# Patient Record
Sex: FEMALE | Race: WHITE | Employment: OTHER | URBAN - METROPOLITAN AREA
[De-identification: names, ages, dates, MRNs, and addresses within clinical notes are randomized per-mention and may not be internally consistent; named-entity substitution may affect disease eponyms.]

---

## 2017-02-28 ENCOUNTER — HOSPITAL ENCOUNTER (OUTPATIENT)
Dept: MAMMOGRAPHY | Age: 72
Discharge: HOME | End: 2017-02-28
Attending: FAMILY MEDICINE
Payer: MEDICARE

## 2017-02-28 DIAGNOSIS — Z12.31 VISIT FOR SCREENING MAMMOGRAM: ICD-10-CM

## 2017-02-28 PROCEDURE — 77067 SCR MAMMO BI INCL CAD: CPT

## 2018-09-28 ENCOUNTER — HOSPITAL ENCOUNTER (OUTPATIENT)
Dept: MAMMOGRAPHY | Age: 73
Discharge: HOME | End: 2018-09-28
Attending: FAMILY MEDICINE
Payer: MEDICARE

## 2018-09-28 DIAGNOSIS — Z12.31 VISIT FOR SCREENING MAMMOGRAM: ICD-10-CM

## 2018-09-28 PROCEDURE — 77067 SCR MAMMO BI INCL CAD: CPT

## 2019-09-30 ENCOUNTER — HOSPITAL ENCOUNTER (OUTPATIENT)
Dept: MAMMOGRAPHY | Age: 74
Discharge: HOME | End: 2019-09-30
Attending: FAMILY MEDICINE
Payer: MEDICARE

## 2019-09-30 DIAGNOSIS — Z12.31 VISIT FOR SCREENING MAMMOGRAM: ICD-10-CM

## 2019-09-30 PROCEDURE — 77067 SCR MAMMO BI INCL CAD: CPT

## 2019-10-10 ENCOUNTER — HOSPITAL ENCOUNTER (OUTPATIENT)
Dept: PREADMISSION TESTING | Age: 74
Discharge: HOME | End: 2019-10-10
Payer: MEDICARE

## 2019-10-10 VITALS
BODY MASS INDEX: 32.84 KG/M2 | SYSTOLIC BLOOD PRESSURE: 134 MMHG | HEART RATE: 65 BPM | RESPIRATION RATE: 18 BRPM | DIASTOLIC BLOOD PRESSURE: 66 MMHG | HEIGHT: 64 IN | TEMPERATURE: 98.6 F | WEIGHT: 192.38 LBS

## 2019-10-10 LAB
ABO + RH BLD: NORMAL
ANION GAP SERPL CALC-SCNC: 7 MMOL/L (ref 5–15)
APPEARANCE UR: CLEAR
ATRIAL RATE: 58 BPM
BACTERIA URNS QL MICRO: NEGATIVE /HPF
BILIRUB UR QL: NEGATIVE
BLOOD GROUP ANTIBODIES SERPL: NORMAL
BUN SERPL-MCNC: 18 MG/DL (ref 6–20)
BUN/CREAT SERPL: 24 (ref 12–20)
CALCIUM SERPL-MCNC: 9.4 MG/DL (ref 8.5–10.1)
CALCULATED P AXIS, ECG09: 37 DEGREES
CALCULATED R AXIS, ECG10: 56 DEGREES
CALCULATED T AXIS, ECG11: 100 DEGREES
CHLORIDE SERPL-SCNC: 103 MMOL/L (ref 97–108)
CO2 SERPL-SCNC: 30 MMOL/L (ref 21–32)
COLOR UR: NORMAL
CREAT SERPL-MCNC: 0.74 MG/DL (ref 0.55–1.02)
DIAGNOSIS, 93000: NORMAL
EPITH CASTS URNS QL MICRO: NORMAL /LPF
ERYTHROCYTE [DISTWIDTH] IN BLOOD BY AUTOMATED COUNT: 13.1 % (ref 11.5–14.5)
EST. AVERAGE GLUCOSE BLD GHB EST-MCNC: 146 MG/DL
GLUCOSE SERPL-MCNC: 130 MG/DL (ref 65–100)
GLUCOSE UR STRIP.AUTO-MCNC: NEGATIVE MG/DL
HBA1C MFR BLD: 6.7 % (ref 4.2–6.3)
HCT VFR BLD AUTO: 41.4 % (ref 35–47)
HGB BLD-MCNC: 13.2 G/DL (ref 11.5–16)
HGB UR QL STRIP: NEGATIVE
INR PPP: 1.1 (ref 0.9–1.1)
KETONES UR QL STRIP.AUTO: NEGATIVE MG/DL
LEUKOCYTE ESTERASE UR QL STRIP.AUTO: NEGATIVE
MCH RBC QN AUTO: 29.4 PG (ref 26–34)
MCHC RBC AUTO-ENTMCNC: 31.9 G/DL (ref 30–36.5)
MCV RBC AUTO: 92.2 FL (ref 80–99)
NITRITE UR QL STRIP.AUTO: NEGATIVE
NRBC # BLD: 0 K/UL (ref 0–0.01)
NRBC BLD-RTO: 0 PER 100 WBC
P-R INTERVAL, ECG05: 162 MS
PH UR STRIP: 6.5 [PH] (ref 5–8)
PLATELET # BLD AUTO: 255 K/UL (ref 150–400)
PMV BLD AUTO: 10.9 FL (ref 8.9–12.9)
POTASSIUM SERPL-SCNC: 3.8 MMOL/L (ref 3.5–5.1)
PROT UR STRIP-MCNC: NEGATIVE MG/DL
PROTHROMBIN TIME: 10.8 SEC (ref 9–11.1)
Q-T INTERVAL, ECG07: 450 MS
QRS DURATION, ECG06: 82 MS
QTC CALCULATION (BEZET), ECG08: 441 MS
RBC # BLD AUTO: 4.49 M/UL (ref 3.8–5.2)
RBC #/AREA URNS HPF: NORMAL /HPF (ref 0–5)
SODIUM SERPL-SCNC: 140 MMOL/L (ref 136–145)
SP GR UR REFRACTOMETRY: 1.01 (ref 1–1.03)
SPECIMEN EXP DATE BLD: NORMAL
UA: UC IF INDICATED,UAUC: NORMAL
UROBILINOGEN UR QL STRIP.AUTO: 0.2 EU/DL (ref 0.2–1)
VENTRICULAR RATE, ECG03: 58 BPM
WBC # BLD AUTO: 5.9 K/UL (ref 3.6–11)
WBC URNS QL MICRO: NORMAL /HPF (ref 0–4)

## 2019-10-10 PROCEDURE — 85027 COMPLETE CBC AUTOMATED: CPT

## 2019-10-10 PROCEDURE — 80048 BASIC METABOLIC PNL TOTAL CA: CPT

## 2019-10-10 PROCEDURE — 85610 PROTHROMBIN TIME: CPT

## 2019-10-10 PROCEDURE — 93005 ELECTROCARDIOGRAM TRACING: CPT

## 2019-10-10 PROCEDURE — 81001 URINALYSIS AUTO W/SCOPE: CPT

## 2019-10-10 PROCEDURE — 86900 BLOOD TYPING SEROLOGIC ABO: CPT

## 2019-10-10 PROCEDURE — 83036 HEMOGLOBIN GLYCOSYLATED A1C: CPT

## 2019-10-10 RX ORDER — ATORVASTATIN CALCIUM 10 MG/1
10 TABLET, FILM COATED ORAL
COMMUNITY
End: 2021-12-03 | Stop reason: SDUPTHER

## 2019-10-10 RX ORDER — MONTELUKAST SODIUM 10 MG/1
10 TABLET ORAL
COMMUNITY
End: 2022-04-05 | Stop reason: SDUPTHER

## 2019-10-10 RX ORDER — METFORMIN HYDROCHLORIDE 500 MG/1
1 TABLET, FILM COATED, EXTENDED RELEASE ORAL EVERY EVENING
COMMUNITY
End: 2021-11-29 | Stop reason: SDUPTHER

## 2019-10-10 RX ORDER — ESCITALOPRAM OXALATE 20 MG/1
20 TABLET ORAL EVERY EVENING
COMMUNITY
End: 2021-12-01 | Stop reason: SDUPTHER

## 2019-10-10 RX ORDER — CLONAZEPAM 1 MG/1
1 TABLET ORAL
COMMUNITY
End: 2019-10-10

## 2019-10-10 RX ORDER — LEVOTHYROXINE SODIUM 112 UG/1
112 TABLET ORAL
COMMUNITY
End: 2021-11-29 | Stop reason: SDUPTHER

## 2019-10-10 RX ORDER — HYDROCHLOROTHIAZIDE 12.5 MG/1
12.5 TABLET ORAL EVERY EVENING
COMMUNITY
End: 2021-12-01 | Stop reason: SDUPTHER

## 2019-10-10 RX ORDER — TRAZODONE HYDROCHLORIDE 50 MG/1
50 TABLET ORAL
COMMUNITY
End: 2021-12-03 | Stop reason: SDUPTHER

## 2019-10-10 RX ORDER — DICLOFENAC SODIUM 100 MG/1
100 TABLET, FILM COATED, EXTENDED RELEASE ORAL
COMMUNITY
End: 2019-10-10

## 2019-10-10 RX ORDER — OMEPRAZOLE 20 MG/1
20 CAPSULE, DELAYED RELEASE ORAL EVERY EVENING
COMMUNITY
End: 2021-07-20

## 2019-10-10 RX ORDER — DICLOFENAC SODIUM 10 MG/G
4 GEL TOPICAL
COMMUNITY
End: 2021-05-31

## 2019-10-10 RX ORDER — LISINOPRIL 40 MG/1
40 TABLET ORAL
COMMUNITY
End: 2021-12-01 | Stop reason: SDUPTHER

## 2019-10-10 RX ORDER — CLONAZEPAM 1 MG/1
1 TABLET ORAL
COMMUNITY
End: 2021-07-20

## 2019-10-10 NOTE — PERIOP NOTES
Preoperative instructions reviewed with patient. Patient given  2 bottles of CHG soap. Instructions reviewed on use of CHG soap. Patient given SSI infection FAQS sheet, as well as a  MRSA/MSSA treatment instruction sheet  With an explanation to patient that they will be notified if treatment instructions need to be initiated. Patient was given the opportunity to ask questions on the information provided.

## 2019-10-11 PROBLEM — M17.12 PRIMARY LOCALIZED OSTEOARTHRITIS OF LEFT KNEE: Status: ACTIVE | Noted: 2019-10-11

## 2019-10-11 LAB
BACTERIA SPEC CULT: NORMAL
BACTERIA SPEC CULT: NORMAL
SERVICE CMNT-IMP: NORMAL

## 2019-10-11 RX ORDER — CEFAZOLIN SODIUM/WATER 2 G/20 ML
2 SYRINGE (ML) INTRAVENOUS EVERY 8 HOURS
Status: CANCELLED | OUTPATIENT
Start: 2019-10-11 | End: 2019-10-12

## 2019-10-11 NOTE — H&P
Patient ID: Gonzalez Rice is a 75 y.o. female.     Chief Complaint: Pain of the Right Knee and Pain of the Left Knee     Elias Mnan comes in to the clinic today for evaluation of both knees. She complains of a gradual onset of bilateral knee pain for the last 3-4 years. She indicates that the pain is increasing throughout the years. She has had steroid injection and from her description sounds like visco-supplementation. Even with steroid injection and visco-supplementation, she continues to have increasing pain in her knees. When going up the stairs she requires the rail to pull herself up due to her pain. She has difficult getting up from a chair complaining of anterior knee pain. She does have pain with routine walking activities. She does feel that she has failed conservative treatment. No recent injuries. No radicular symptoms. No systemic symptoms of infection.         Review of Systems   10/3/2019     Constitutional: Unexplained: Negative  Genitourinary: Frequent Urination: Negative  HEENT: Vision Loss: Negative  Neurological: Memory Loss: Negative  Integumentary: Rash: Negative  Cardiovascular: Palpatations: Negative  Hematologic: Bruises/Bleeds Easily: Positive  Gastrointestinal: Constipation: Negative  Immunological: Seasonal Allergies: Positive  Musculoskeletal: Joint Pain: Positive     Medical History        Past Medical History:   Diagnosis Date    Anxiety      Asthma      Depression      Diabetes mellitus      Hypertension              Surgical History         Past Surgical History:   Procedure Laterality Date    BREAST RECONSTRUCTION        CARPAL TUNNEL RELEASE        CHOLECYSTECTOMY        FOOT SURGERY        TONSILLECTOMY        UMBILICAL HERNIA REPAIR                Objective:      There were no vitals filed for this visit.     Constitutional:  No acute distress. Well nourished. Well developed. Eyes:  Sclera are nonicteric.   Respiratory:  No labored breathing. Cardiovascular:  No marked edema. Skin:  No marked skin ulcers. Neurological:  No marked sensory loss noted. Psychiatric: Alert and oriented x3. Musculoskeletal      On exam of both knees reveals a lack of 5 degrees to full extension and flexion to 116 degrees on the left knee and a lack of 4 degrees to full extension and flexion to 116 degrees on the right knee. She has marked crepitus and tenderness in the patellofemoral joint of both knees. Diffuse tenderness throughout the knee joint. Synovial hypertrophy. The cruciate and collateral ligaments are stable. No effusion. No sign of infection. No ecchymosis or erythema. No cellulitis or rash. No calf pain, no evidence of DVT. I detect no obvious motor or sensory deficits in the lower extremities. The extensor mechanism is intact. The neurovascular status is intact.        Imaging/Studies:    Order: XR KNEE 4+ VW LEFT - Indication: Chronic pain of both knees  Order: XR KNEE 3 VW RIGHT - Indication: Chronic pain of both knees        X-ray Knee Right 3 Views     Result Date: 10/3/2019  Views: Standing AP, Lat, Langerma, Aabenraa.      Impression: Impression: I ordered and interpreted X-rays of the right knee. They revealed advance degenerative changes to the patellofemoral joint with bone on bone contact. Significant tibial femoral degenerative changes noted.      X-ray Knee Left 4+ Views ([id number])     Result Date: 10/3/2019  Views: Jarvis Stacy, Standing AP, Aabenraa.      Impression: Impression: I ordered and interpreted X-rays of the right knee. They revealed advance degenerative changes to the patellofemoral joint with bone on bone contact. Significant tibial femoral degenerative changes noted.      X-ray Foot Left 3+ Views ([id number])     Result Date: 10/2/2019  Shielding: Not Shielded. Weight Bearing. Standing AP, Oblique, Lat.      Impression: Radiographs demonstrate MTP degenerative changes about the great toe and 2nd toe.   Subluxation of the great toe at the MTP joint        Assessment:   There is no problem list on file for this patient.            ICD-10-CM   1. Chronic pain of both knees M25.561     M25.562     G89.29   2. Primary osteoarthritis of both knees M17.0   3. Synovitis of left knee M65.9   4. Synovitis of right knee M65.9            Plan:   I personally reviewed my findings with the patient. I reviewed the pathophysiology and explained that advance degenerative arthritis with bone on bone contact of the patellofemoral joint, left more than right, and synovitis to both knees.      We discussed treatment options. She feels that she has failed conservative treatment including steroid injection and visco-supplementation. I explained that the only modality left would be total knee replacement. I explained the hospitalization, post-op and rehabilitation for the procedure. We discussed all complications associated with the surgery, including the chance of infection, DVT, and pulmonary embolus. I explained the use of antibiotics and anti-coagulants following surgery to prevent infection and DVT and we discussed the course of post-op physical therapy for rehabilitation.     She verbalized her understanding of my explanation and would like to proceed with total knee replacement. She was more symptomatic with the left knee, and she would like to proceed with left total knee replacement. We will tentatively schedule her left total knee replacement in the next few weeks. She understands she will undergo left total knee replacement first    PROCEDURE: Left total knee replacement. Date of Surgery Update:  Aidee Wilkinson was seen and examined. Past Medical History:   Diagnosis Date    Anxiety state, unspecified     Arthritis     knes and back , foot osteoarthritis    Asthma     [facility] [address]     Hypertension     Hypothyroidism     Menopause     LMP-45years old?     Morbid obesity ([facility] [address]     Psychiatric disorder     ANXIETY, DEPRESSION    Seizures ([address]     had a seizure as a result of medication about 50 years ago    Vitamin D deficiency 5/11/2010     Prior to Admission Medications   Prescriptions Last Dose Informant Patient Reported? Taking? Cholecalciferol, Vitamin D3, (VITAMIN D3) 1,000 unit Cap  Self Yes No   Sig: Take 2,000 Units by mouth every evening. atorvastatin (LIPITOR) 10 mg tablet  Self Yes No   Sig: Take 10 mg by mouth nightly. clonazePAM (KLONOPIN) 1 mg tablet   Yes No   Sig: Take 1 mg by mouth nightly. diclofenac (VOLTAREN) 1 % gel  Self Yes No   Sig: Apply 4 g to affected area daily as needed. escitalopram oxalate (LEXAPRO) 20 mg tablet  Self Yes No   Sig: Take 20 mg by mouth every evening. hydroCHLOROthiazide (HYDRODIURIL) 12.5 mg tablet  Self Yes No   Sig: Take 12.5 mg by mouth every evening. levothyroxine (SYNTHROID) 112 mcg tablet  Self Yes No   Sig: Take 112 mcg by mouth Daily (before breakfast). lisinopril (PRINIVIL, ZESTRIL) 40 mg tablet  Self Yes No   Sig: Take 40 mg by mouth every evening. metFORMIN (GLUMETZA ER) 500 mg TG24 24 hour tablet  Self Yes No   Sig: Take 1 Tab by mouth every evening.   montelukast (SINGULAIR) 10 mg tablet  Self Yes No   Sig: Take 10 mg by mouth nightly. omeprazole (PRILOSEC) 20 mg capsule  Self Yes No   Sig: Take 20 mg by mouth every evening. traZODone (DESYREL) 50 mg tablet  Self Yes No   Sig: Take 50 mg by mouth nightly. Facility-Administered Medications: None      Allergy to: Everlina Formosa; Codeine; Keflex [cephalexin]; and Tussionex  Physical Examination: General appearance - alert, well appearing, and in no distress  History and physical has been reviewed. The patient has been examined.  There have been no significant clinical changes since the completion of the originally dated History and Physical.    Signed By: Leonarda Tsai PA-C     October 16, 2019 10:32 AM

## 2019-10-15 ENCOUNTER — ANESTHESIA EVENT (OUTPATIENT)
Dept: SURGERY | Age: 74
End: 2019-10-15
Payer: MEDICARE

## 2019-10-16 ENCOUNTER — HOSPITAL ENCOUNTER (OUTPATIENT)
Age: 74
Setting detail: OBSERVATION
Discharge: HOME HEALTH | End: 2019-10-20
Attending: ORTHOPAEDIC SURGERY | Admitting: ORTHOPAEDIC SURGERY
Payer: MEDICARE

## 2019-10-16 ENCOUNTER — ANESTHESIA (OUTPATIENT)
Dept: SURGERY | Age: 74
End: 2019-10-16
Payer: MEDICARE

## 2019-10-16 DIAGNOSIS — M17.12 PRIMARY LOCALIZED OSTEOARTHRITIS OF LEFT KNEE: Primary | ICD-10-CM

## 2019-10-16 LAB
GLUCOSE BLD STRIP.AUTO-MCNC: 117 MG/DL (ref 65–100)
GLUCOSE BLD STRIP.AUTO-MCNC: 152 MG/DL (ref 65–100)
SERVICE CMNT-IMP: ABNORMAL
SERVICE CMNT-IMP: ABNORMAL

## 2019-10-16 PROCEDURE — 76010000171 HC OR TIME 2 TO 2.5 HR INTENSV-TIER 1: Performed by: ORTHOPAEDIC SURGERY

## 2019-10-16 PROCEDURE — 82962 GLUCOSE BLOOD TEST: CPT

## 2019-10-16 PROCEDURE — 77030018836 HC SOL IRR NACL ICUM -A: Performed by: ORTHOPAEDIC SURGERY

## 2019-10-16 PROCEDURE — 77030040922 HC BLNKT HYPOTHRM STRY -A

## 2019-10-16 PROCEDURE — 74011250636 HC RX REV CODE- 250/636: Performed by: NURSE ANESTHETIST, CERTIFIED REGISTERED

## 2019-10-16 PROCEDURE — 74011250636 HC RX REV CODE- 250/636: Performed by: PHYSICIAN ASSISTANT

## 2019-10-16 PROCEDURE — 77030031139 HC SUT VCRL2 J&J -A: Performed by: ORTHOPAEDIC SURGERY

## 2019-10-16 PROCEDURE — 76210000006 HC OR PH I REC 0.5 TO 1 HR: Performed by: ORTHOPAEDIC SURGERY

## 2019-10-16 PROCEDURE — 74011000258 HC RX REV CODE- 258: Performed by: PHYSICIAN ASSISTANT

## 2019-10-16 PROCEDURE — 99218 HC RM OBSERVATION: CPT

## 2019-10-16 PROCEDURE — 77030012935 HC DRSG AQUACEL BMS -B: Performed by: ORTHOPAEDIC SURGERY

## 2019-10-16 PROCEDURE — 77030018846 HC SOL IRR STRL H20 ICUM -A: Performed by: ORTHOPAEDIC SURGERY

## 2019-10-16 PROCEDURE — 77030035236 HC SUT PDS STRATFX BARB J&J -B: Performed by: ORTHOPAEDIC SURGERY

## 2019-10-16 PROCEDURE — 77030003601 HC NDL NRV BLK BBMI -A

## 2019-10-16 PROCEDURE — 74011250636 HC RX REV CODE- 250/636: Performed by: ANESTHESIOLOGY

## 2019-10-16 PROCEDURE — 74011000258 HC RX REV CODE- 258: Performed by: NURSE ANESTHETIST, CERTIFIED REGISTERED

## 2019-10-16 PROCEDURE — 77030006835 HC BLD SAW SAG STRY -B: Performed by: ORTHOPAEDIC SURGERY

## 2019-10-16 PROCEDURE — 77030005513 HC CATH URETH FOL11 MDII -B: Performed by: ORTHOPAEDIC SURGERY

## 2019-10-16 PROCEDURE — 74011250637 HC RX REV CODE- 250/637: Performed by: ANESTHESIOLOGY

## 2019-10-16 PROCEDURE — 76060000036 HC ANESTHESIA 2.5 TO 3 HR: Performed by: ORTHOPAEDIC SURGERY

## 2019-10-16 PROCEDURE — C9290 INJ, BUPIVACAINE LIPOSOME: HCPCS | Performed by: PHYSICIAN ASSISTANT

## 2019-10-16 PROCEDURE — 77030014077 HC TOWER MX CEM J&J -C: Performed by: ORTHOPAEDIC SURGERY

## 2019-10-16 PROCEDURE — 74011000250 HC RX REV CODE- 250: Performed by: NURSE ANESTHETIST, CERTIFIED REGISTERED

## 2019-10-16 PROCEDURE — 77030011640 HC PAD GRND REM COVD -A: Performed by: ORTHOPAEDIC SURGERY

## 2019-10-16 PROCEDURE — 77030040361 HC SLV COMPR DVT MDII -B: Performed by: ORTHOPAEDIC SURGERY

## 2019-10-16 PROCEDURE — 65390000012 HC CONDITION CODE 44 OBSERVATION

## 2019-10-16 PROCEDURE — 77030039497 HC CST PAD STERILE CHCS -A: Performed by: ORTHOPAEDIC SURGERY

## 2019-10-16 PROCEDURE — 74011000272 HC RX REV CODE- 272: Performed by: PHYSICIAN ASSISTANT

## 2019-10-16 PROCEDURE — C1713 ANCHOR/SCREW BN/BN,TIS/BN: HCPCS | Performed by: ORTHOPAEDIC SURGERY

## 2019-10-16 PROCEDURE — C1776 JOINT DEVICE (IMPLANTABLE): HCPCS | Performed by: ORTHOPAEDIC SURGERY

## 2019-10-16 PROCEDURE — 74011000250 HC RX REV CODE- 250: Performed by: PHYSICIAN ASSISTANT

## 2019-10-16 PROCEDURE — 74011250636 HC RX REV CODE- 250/636: Performed by: ORTHOPAEDIC SURGERY

## 2019-10-16 PROCEDURE — 77030040361 HC SLV COMPR DVT MDII -B

## 2019-10-16 PROCEDURE — 77030018673: Performed by: ORTHOPAEDIC SURGERY

## 2019-10-16 PROCEDURE — 77030012893

## 2019-10-16 PROCEDURE — 77030008462 HC STPLR SKN PROX J&J -A: Performed by: ORTHOPAEDIC SURGERY

## 2019-10-16 PROCEDURE — 74011250637 HC RX REV CODE- 250/637: Performed by: PHYSICIAN ASSISTANT

## 2019-10-16 DEVICE — INSERT TIB RP FEM KNEE CEM: Type: IMPLANTABLE DEVICE | Status: FUNCTIONAL

## 2019-10-16 DEVICE — SMARTSET GHV GENTAMICIN HIGH VISCOSITY BONE CEMENT 40G
Type: IMPLANTABLE DEVICE | Site: KNEE | Status: FUNCTIONAL
Brand: SMARTSET

## 2019-10-16 DEVICE — ATTUNE KNEE SYSTEM TIBIAL INSERT FIXED BEARING POSTERIOR STABILIZED 4 5MM AOX
Type: IMPLANTABLE DEVICE | Site: KNEE | Status: FUNCTIONAL
Brand: ATTUNE

## 2019-10-16 DEVICE — ATTUNE KNEE SYSTEM FEMORAL POSTERIOR STABILIZED SIZE 4 LEFT CEMENTED
Type: IMPLANTABLE DEVICE | Site: KNEE | Status: FUNCTIONAL
Brand: ATTUNE

## 2019-10-16 DEVICE — ATTUNE PATELLA MEDIALIZED DOME 35MM CEMENTED AOX
Type: IMPLANTABLE DEVICE | Site: KNEE | Status: FUNCTIONAL
Brand: ATTUNE

## 2019-10-16 DEVICE — ATTUNE KNEE SYSTEM TIBIAL BASE FIXED BEARING SIZE 4 CEMENTED
Type: IMPLANTABLE DEVICE | Site: KNEE | Status: FUNCTIONAL
Brand: ATTUNE

## 2019-10-16 RX ORDER — SODIUM CHLORIDE 9 MG/ML
25 INJECTION, SOLUTION INTRAVENOUS CONTINUOUS
Status: DISCONTINUED | OUTPATIENT
Start: 2019-10-16 | End: 2019-10-16 | Stop reason: HOSPADM

## 2019-10-16 RX ORDER — ACETAMINOPHEN 325 MG/1
650 TABLET ORAL ONCE
Status: COMPLETED | OUTPATIENT
Start: 2019-10-16 | End: 2019-10-16

## 2019-10-16 RX ORDER — MORPHINE SULFATE 10 MG/ML
2 INJECTION, SOLUTION INTRAMUSCULAR; INTRAVENOUS
Status: DISCONTINUED | OUTPATIENT
Start: 2019-10-16 | End: 2019-10-16 | Stop reason: HOSPADM

## 2019-10-16 RX ORDER — MIDAZOLAM HYDROCHLORIDE 1 MG/ML
0.5 INJECTION, SOLUTION INTRAMUSCULAR; INTRAVENOUS
Status: DISCONTINUED | OUTPATIENT
Start: 2019-10-16 | End: 2019-10-16 | Stop reason: HOSPADM

## 2019-10-16 RX ORDER — ONDANSETRON 2 MG/ML
4 INJECTION INTRAMUSCULAR; INTRAVENOUS AS NEEDED
Status: DISCONTINUED | OUTPATIENT
Start: 2019-10-16 | End: 2019-10-16 | Stop reason: HOSPADM

## 2019-10-16 RX ORDER — MIDAZOLAM HYDROCHLORIDE 1 MG/ML
INJECTION, SOLUTION INTRAMUSCULAR; INTRAVENOUS AS NEEDED
Status: DISCONTINUED | OUTPATIENT
Start: 2019-10-16 | End: 2019-10-16 | Stop reason: HOSPADM

## 2019-10-16 RX ORDER — MIDAZOLAM HYDROCHLORIDE 1 MG/ML
1 INJECTION, SOLUTION INTRAMUSCULAR; INTRAVENOUS AS NEEDED
Status: DISCONTINUED | OUTPATIENT
Start: 2019-10-16 | End: 2019-10-16 | Stop reason: HOSPADM

## 2019-10-16 RX ORDER — PROPOFOL 10 MG/ML
INJECTION, EMULSION INTRAVENOUS AS NEEDED
Status: DISCONTINUED | OUTPATIENT
Start: 2019-10-16 | End: 2019-10-16

## 2019-10-16 RX ORDER — SODIUM CHLORIDE, SODIUM LACTATE, POTASSIUM CHLORIDE, CALCIUM CHLORIDE 600; 310; 30; 20 MG/100ML; MG/100ML; MG/100ML; MG/100ML
1000 INJECTION, SOLUTION INTRAVENOUS CONTINUOUS
Status: DISCONTINUED | OUTPATIENT
Start: 2019-10-16 | End: 2019-10-16 | Stop reason: HOSPADM

## 2019-10-16 RX ORDER — OXYCODONE HYDROCHLORIDE 5 MG/1
10 TABLET ORAL
Status: DISCONTINUED | OUTPATIENT
Start: 2019-10-16 | End: 2019-10-20 | Stop reason: HOSPADM

## 2019-10-16 RX ORDER — KETAMINE HYDROCHLORIDE 10 MG/ML
INJECTION, SOLUTION INTRAMUSCULAR; INTRAVENOUS AS NEEDED
Status: DISCONTINUED | OUTPATIENT
Start: 2019-10-16 | End: 2019-10-16 | Stop reason: HOSPADM

## 2019-10-16 RX ORDER — FACIAL-BODY WIPES
10 EACH TOPICAL DAILY PRN
Status: DISCONTINUED | OUTPATIENT
Start: 2019-10-18 | End: 2019-10-20 | Stop reason: HOSPADM

## 2019-10-16 RX ORDER — HYDROMORPHONE HYDROCHLORIDE 1 MG/ML
0.5 INJECTION, SOLUTION INTRAMUSCULAR; INTRAVENOUS; SUBCUTANEOUS
Status: ACTIVE | OUTPATIENT
Start: 2019-10-16 | End: 2019-10-17

## 2019-10-16 RX ORDER — ACETAMINOPHEN 325 MG/1
650 TABLET ORAL EVERY 6 HOURS
Status: DISCONTINUED | OUTPATIENT
Start: 2019-10-16 | End: 2019-10-20 | Stop reason: HOSPADM

## 2019-10-16 RX ORDER — CEFAZOLIN SODIUM/WATER 2 G/20 ML
2 SYRINGE (ML) INTRAVENOUS EVERY 8 HOURS
Status: COMPLETED | OUTPATIENT
Start: 2019-10-16 | End: 2019-10-17

## 2019-10-16 RX ORDER — FAMOTIDINE 20 MG/1
20 TABLET, FILM COATED ORAL
Status: DISCONTINUED | OUTPATIENT
Start: 2019-10-16 | End: 2019-10-20 | Stop reason: HOSPADM

## 2019-10-16 RX ORDER — DEXAMETHASONE SODIUM PHOSPHATE 4 MG/ML
INJECTION, SOLUTION INTRA-ARTICULAR; INTRALESIONAL; INTRAMUSCULAR; INTRAVENOUS; SOFT TISSUE AS NEEDED
Status: DISCONTINUED | OUTPATIENT
Start: 2019-10-16 | End: 2019-10-16 | Stop reason: HOSPADM

## 2019-10-16 RX ORDER — FENTANYL CITRATE 50 UG/ML
50 INJECTION, SOLUTION INTRAMUSCULAR; INTRAVENOUS AS NEEDED
Status: DISCONTINUED | OUTPATIENT
Start: 2019-10-16 | End: 2019-10-16 | Stop reason: HOSPADM

## 2019-10-16 RX ORDER — ONDANSETRON 4 MG/1
4 TABLET, ORALLY DISINTEGRATING ORAL
Status: DISCONTINUED | OUTPATIENT
Start: 2019-10-16 | End: 2019-10-20 | Stop reason: HOSPADM

## 2019-10-16 RX ORDER — AMOXICILLIN 250 MG
1 CAPSULE ORAL 2 TIMES DAILY
Status: DISCONTINUED | OUTPATIENT
Start: 2019-10-16 | End: 2019-10-20 | Stop reason: HOSPADM

## 2019-10-16 RX ORDER — MAGNESIUM SULFATE 100 %
4 CRYSTALS MISCELLANEOUS AS NEEDED
Status: DISCONTINUED | OUTPATIENT
Start: 2019-10-16 | End: 2019-10-20 | Stop reason: HOSPADM

## 2019-10-16 RX ORDER — LIDOCAINE HYDROCHLORIDE 10 MG/ML
0.1 INJECTION, SOLUTION EPIDURAL; INFILTRATION; INTRACAUDAL; PERINEURAL AS NEEDED
Status: DISCONTINUED | OUTPATIENT
Start: 2019-10-16 | End: 2019-10-16 | Stop reason: HOSPADM

## 2019-10-16 RX ORDER — CELECOXIB 200 MG/1
200 CAPSULE ORAL ONCE
Status: COMPLETED | OUTPATIENT
Start: 2019-10-16 | End: 2019-10-16

## 2019-10-16 RX ORDER — ONDANSETRON 2 MG/ML
INJECTION INTRAMUSCULAR; INTRAVENOUS AS NEEDED
Status: DISCONTINUED | OUTPATIENT
Start: 2019-10-16 | End: 2019-10-16 | Stop reason: HOSPADM

## 2019-10-16 RX ORDER — INSULIN LISPRO 100 [IU]/ML
INJECTION, SOLUTION INTRAVENOUS; SUBCUTANEOUS
Status: DISCONTINUED | OUTPATIENT
Start: 2019-10-16 | End: 2019-10-20 | Stop reason: HOSPADM

## 2019-10-16 RX ORDER — ALBUTEROL SULFATE 0.83 MG/ML
2.5 SOLUTION RESPIRATORY (INHALATION) AS NEEDED
Status: DISCONTINUED | OUTPATIENT
Start: 2019-10-16 | End: 2019-10-16 | Stop reason: HOSPADM

## 2019-10-16 RX ORDER — SODIUM CHLORIDE 0.9 % (FLUSH) 0.9 %
5-40 SYRINGE (ML) INJECTION AS NEEDED
Status: DISCONTINUED | OUTPATIENT
Start: 2019-10-16 | End: 2019-10-20 | Stop reason: HOSPADM

## 2019-10-16 RX ORDER — PROPOFOL 10 MG/ML
INJECTION, EMULSION INTRAVENOUS
Status: DISCONTINUED | OUTPATIENT
Start: 2019-10-16 | End: 2019-10-16 | Stop reason: HOSPADM

## 2019-10-16 RX ORDER — BUPIVACAINE HYDROCHLORIDE 5 MG/ML
INJECTION, SOLUTION EPIDURAL; INTRACAUDAL
Status: COMPLETED | OUTPATIENT
Start: 2019-10-16 | End: 2019-10-16

## 2019-10-16 RX ORDER — POLYETHYLENE GLYCOL 3350 17 G/17G
17 POWDER, FOR SOLUTION ORAL DAILY
Status: DISCONTINUED | OUTPATIENT
Start: 2019-10-17 | End: 2019-10-20 | Stop reason: HOSPADM

## 2019-10-16 RX ORDER — DIPHENHYDRAMINE HYDROCHLORIDE 50 MG/ML
12.5 INJECTION, SOLUTION INTRAMUSCULAR; INTRAVENOUS AS NEEDED
Status: DISCONTINUED | OUTPATIENT
Start: 2019-10-16 | End: 2019-10-16 | Stop reason: HOSPADM

## 2019-10-16 RX ORDER — SODIUM CHLORIDE 0.9 % (FLUSH) 0.9 %
5-40 SYRINGE (ML) INJECTION EVERY 8 HOURS
Status: DISCONTINUED | OUTPATIENT
Start: 2019-10-16 | End: 2019-10-20 | Stop reason: HOSPADM

## 2019-10-16 RX ORDER — PHENYLEPHRINE HCL IN 0.9% NACL 0.4MG/10ML
SYRINGE (ML) INTRAVENOUS AS NEEDED
Status: DISCONTINUED | OUTPATIENT
Start: 2019-10-16 | End: 2019-10-16

## 2019-10-16 RX ORDER — NALOXONE HYDROCHLORIDE 0.4 MG/ML
0.4 INJECTION, SOLUTION INTRAMUSCULAR; INTRAVENOUS; SUBCUTANEOUS AS NEEDED
Status: DISCONTINUED | OUTPATIENT
Start: 2019-10-16 | End: 2019-10-20 | Stop reason: HOSPADM

## 2019-10-16 RX ORDER — FENTANYL CITRATE 50 UG/ML
25 INJECTION, SOLUTION INTRAMUSCULAR; INTRAVENOUS
Status: DISCONTINUED | OUTPATIENT
Start: 2019-10-16 | End: 2019-10-16 | Stop reason: HOSPADM

## 2019-10-16 RX ORDER — SODIUM CHLORIDE 9 MG/ML
125 INJECTION, SOLUTION INTRAVENOUS CONTINUOUS
Status: DISPENSED | OUTPATIENT
Start: 2019-10-16 | End: 2019-10-17

## 2019-10-16 RX ORDER — DEXTROSE MONOHYDRATE 100 MG/ML
0-250 INJECTION, SOLUTION INTRAVENOUS AS NEEDED
Status: DISCONTINUED | OUTPATIENT
Start: 2019-10-16 | End: 2019-10-20 | Stop reason: HOSPADM

## 2019-10-16 RX ORDER — CEFAZOLIN SODIUM/WATER 2 G/20 ML
2 SYRINGE (ML) INTRAVENOUS EVERY 8 HOURS
Status: DISCONTINUED | OUTPATIENT
Start: 2019-10-16 | End: 2019-10-16 | Stop reason: HOSPADM

## 2019-10-16 RX ORDER — GLYCOPYRROLATE 0.2 MG/ML
0.2 INJECTION INTRAMUSCULAR; INTRAVENOUS
Status: DISCONTINUED | OUTPATIENT
Start: 2019-10-16 | End: 2019-10-16 | Stop reason: HOSPADM

## 2019-10-16 RX ORDER — HYDROMORPHONE HYDROCHLORIDE 1 MG/ML
0.2 INJECTION, SOLUTION INTRAMUSCULAR; INTRAVENOUS; SUBCUTANEOUS
Status: DISCONTINUED | OUTPATIENT
Start: 2019-10-16 | End: 2019-10-16 | Stop reason: HOSPADM

## 2019-10-16 RX ORDER — ROPIVACAINE HYDROCHLORIDE 5 MG/ML
INJECTION, SOLUTION EPIDURAL; INFILTRATION; PERINEURAL
Status: COMPLETED | OUTPATIENT
Start: 2019-10-16 | End: 2019-10-16

## 2019-10-16 RX ORDER — CEFAZOLIN SODIUM/WATER 2 G/20 ML
2 SYRINGE (ML) INTRAVENOUS ONCE
Status: DISPENSED | OUTPATIENT
Start: 2019-10-16 | End: 2019-10-16

## 2019-10-16 RX ORDER — HYDROXYZINE HYDROCHLORIDE 10 MG/1
10 TABLET, FILM COATED ORAL
Status: DISCONTINUED | OUTPATIENT
Start: 2019-10-16 | End: 2019-10-20 | Stop reason: HOSPADM

## 2019-10-16 RX ORDER — SODIUM CHLORIDE, SODIUM LACTATE, POTASSIUM CHLORIDE, CALCIUM CHLORIDE 600; 310; 30; 20 MG/100ML; MG/100ML; MG/100ML; MG/100ML
INJECTION, SOLUTION INTRAVENOUS
Status: DISCONTINUED | OUTPATIENT
Start: 2019-10-16 | End: 2019-10-16 | Stop reason: HOSPADM

## 2019-10-16 RX ORDER — OXYCODONE HYDROCHLORIDE 5 MG/1
5 TABLET ORAL
Status: DISCONTINUED | OUTPATIENT
Start: 2019-10-16 | End: 2019-10-20 | Stop reason: HOSPADM

## 2019-10-16 RX ORDER — ROPIVACAINE HYDROCHLORIDE 5 MG/ML
30 INJECTION, SOLUTION EPIDURAL; INFILTRATION; PERINEURAL AS NEEDED
Status: DISCONTINUED | OUTPATIENT
Start: 2019-10-16 | End: 2019-10-16 | Stop reason: HOSPADM

## 2019-10-16 RX ORDER — CELECOXIB 200 MG/1
200 CAPSULE ORAL EVERY 12 HOURS
Status: DISCONTINUED | OUTPATIENT
Start: 2019-10-16 | End: 2019-10-20 | Stop reason: HOSPADM

## 2019-10-16 RX ADMIN — CELECOXIB 200 MG: 200 CAPSULE ORAL at 22:15

## 2019-10-16 RX ADMIN — ROPIVACAINE HYDROCHLORIDE 30 ML: 5 INJECTION, SOLUTION EPIDURAL; INFILTRATION; PERINEURAL at 11:31

## 2019-10-16 RX ADMIN — PROPOFOL 100 MCG/KG/MIN: 10 INJECTION, EMULSION INTRAVENOUS at 11:59

## 2019-10-16 RX ADMIN — SENNOSIDES, DOCUSATE SODIUM 1 TABLET: 50; 8.6 TABLET, FILM COATED ORAL at 17:50

## 2019-10-16 RX ADMIN — OXYCODONE HYDROCHLORIDE 5 MG: 5 TABLET ORAL at 20:52

## 2019-10-16 RX ADMIN — CELECOXIB 200 MG: 200 CAPSULE ORAL at 11:09

## 2019-10-16 RX ADMIN — ONDANSETRON HYDROCHLORIDE 4 MG: 2 INJECTION, SOLUTION INTRAMUSCULAR; INTRAVENOUS at 13:50

## 2019-10-16 RX ADMIN — PHENYLEPHRINE HYDROCHLORIDE 40 MCG/MIN: 10 INJECTION INTRAVENOUS at 12:30

## 2019-10-16 RX ADMIN — ACETAMINOPHEN 650 MG: 325 TABLET, FILM COATED ORAL at 11:09

## 2019-10-16 RX ADMIN — MIDAZOLAM 1 MG: 1 INJECTION INTRAMUSCULAR; INTRAVENOUS at 11:52

## 2019-10-16 RX ADMIN — FENTANYL CITRATE 50 MCG: 50 INJECTION, SOLUTION INTRAMUSCULAR; INTRAVENOUS at 11:28

## 2019-10-16 RX ADMIN — SODIUM CHLORIDE, POTASSIUM CHLORIDE, SODIUM LACTATE AND CALCIUM CHLORIDE: 600; 310; 30; 20 INJECTION, SOLUTION INTRAVENOUS at 11:52

## 2019-10-16 RX ADMIN — DEXAMETHASONE SODIUM PHOSPHATE 4 MG: 4 INJECTION, SOLUTION INTRAMUSCULAR; INTRAVENOUS at 12:47

## 2019-10-16 RX ADMIN — TRANEXAMIC ACID 1 G: 100 INJECTION, SOLUTION INTRAVENOUS at 12:18

## 2019-10-16 RX ADMIN — SODIUM CHLORIDE, SODIUM LACTATE, POTASSIUM CHLORIDE, AND CALCIUM CHLORIDE 1000 ML: 600; 310; 30; 20 INJECTION, SOLUTION INTRAVENOUS at 11:12

## 2019-10-16 RX ADMIN — BUPIVACAINE HYDROCHLORIDE 10 MG: 5 INJECTION, SOLUTION EPIDURAL; INTRACAUDAL; PERINEURAL at 12:06

## 2019-10-16 RX ADMIN — SODIUM CHLORIDE 125 ML/HR: 900 INJECTION, SOLUTION INTRAVENOUS at 15:00

## 2019-10-16 RX ADMIN — KETAMINE HYDROCHLORIDE 25 MG: 10 INJECTION, SOLUTION INTRAMUSCULAR; INTRAVENOUS at 12:55

## 2019-10-16 RX ADMIN — ACETAMINOPHEN 650 MG: 325 TABLET, FILM COATED ORAL at 22:15

## 2019-10-16 RX ADMIN — ACETAMINOPHEN 650 MG: 325 TABLET, FILM COATED ORAL at 17:50

## 2019-10-16 RX ADMIN — Medication 2 G: at 20:52

## 2019-10-16 RX ADMIN — MIDAZOLAM 2 MG: 1 INJECTION INTRAMUSCULAR; INTRAVENOUS at 12:00

## 2019-10-16 RX ADMIN — Medication 2 G: at 12:20

## 2019-10-16 RX ADMIN — RIVAROXABAN 10 MG: 10 TABLET, FILM COATED ORAL at 20:52

## 2019-10-16 RX ADMIN — OXYCODONE HYDROCHLORIDE 5 MG: 5 TABLET ORAL at 17:50

## 2019-10-16 RX ADMIN — MIDAZOLAM 2 MG: 1 INJECTION INTRAMUSCULAR; INTRAVENOUS at 11:28

## 2019-10-16 NOTE — ANESTHESIA PREPROCEDURE EVALUATION
Relevant Problems   No relevant active problems       Anesthetic History   No history of anesthetic complications            Review of Systems / Medical History  Patient summary reviewed, nursing notes reviewed and pertinent labs reviewed    Pulmonary            Asthma : well controlled       Neuro/Psych         Psychiatric history     Cardiovascular    Hypertension: well controlled              Exercise tolerance: >4 METS     GI/Hepatic/Renal  Within defined limits              Endo/Other    Diabetes: well controlled, type 2  Hypothyroidism  Arthritis     Other Findings              Physical Exam    Airway  Mallampati: II  TM Distance: > 6 cm  Neck ROM: normal range of motion   Mouth opening: Normal     Cardiovascular  Regular rate and rhythm,  S1 and S2 normal,  no murmur, click, rub, or gallop             Dental  No notable dental hx       Pulmonary  Breath sounds clear to auscultation               Abdominal  GI exam deferred       Other Findings            Anesthetic Plan    ASA: 3  Anesthesia type: spinal      Post-op pain plan if not by surgeon: peripheral nerve block single    Induction: Intravenous  Anesthetic plan and risks discussed with: Patient

## 2019-10-16 NOTE — ANESTHESIA PROCEDURE NOTES
Peripheral Block    Start time: 10/16/2019 11:23 AM  End time: 10/16/2019 11:32 AM  Performed by: Mehreen Snow MD  Authorized by: Mehreen Snow MD       Pre-procedure: Indications: at surgeon's request, post-op pain management and procedure for pain    Preanesthetic Checklist: patient identified, risks and benefits discussed, site marked, timeout performed, anesthesia consent given and patient being monitored    Timeout Time: 11:23          Block Type:   Block Type:   Adductor canal  Laterality:  Left  Monitoring:  Standard ASA monitoring, continuous pulse ox, frequent vital sign checks, heart rate, oxygen and responsive to questions  Injection Technique:  Single shot  Procedures: ultrasound guided    Patient Position: supine  Prep: chlorhexidine    Location:  Mid thigh  Needle Type:  Stimuplex  Needle Gauge:  22 G  Needle Localization:  Ultrasound guidance    Assessment:  Number of attempts:  1  Injection Assessment:  Incremental injection every 5 mL, negative aspiration for CSF, no paresthesia, local visualized surrounding nerve on ultrasound, negative aspiration for blood and no intravascular symptoms  Patient tolerance:  Patient tolerated the procedure well with no immediate complications  No image stored

## 2019-10-16 NOTE — ANESTHESIA POSTPROCEDURE EVALUATION
Post-Anesthesia Evaluation and Assessment    Patient: Kyler Burkett MRN: [id number]  SSN: [id number]    YOB: 1945  Age: 75 y.o. Sex: female       Cardiovascular Function/Vital Signs  Visit Vitals  /64   Pulse 60   Temp 36.3 °C (97.4 °F)   Resp 13   Ht 5' 4\" (1.626 m)   Wt 87.3 kg (192 lb 6 oz)   SpO2 99%   BMI 33.02 kg/m²       Patient is status post Spinal anesthesia for Procedure(s):  LEFT TOTAL KNEE REPLACEMENT. Nausea/Vomiting: None    Postoperative hydration reviewed and adequate. Pain:  Pain Scale 1: Numeric (0 - 10) (10/16/19 1501)  Pain Intensity 1: 0 (10/16/19 1501)   Managed    Neurological Status:   Neuro (WDL): (as stated) (10/16/19 1501)  Neuro  LLE Motor Response: No movement to any stimulus (10/16/19 1433)  RLE Motor Response: No movement to any stimulus (10/16/19 1433)   At baseline    Mental Status and Level of Consciousness: Alert and oriented to person, place, and time    Pulmonary Status:   O2 Device: Room air (10/16/19 1501)   Adequate oxygenation and airway patent    Complications related to anesthesia: None    Post-anesthesia assessment completed. No concerns    Signed By: Hesham Ayon MD     October 16, 2019              Post-Anesthesia Evaluation and Assessment    Patient: Kyler Burkett MRN: [id number]  SSN: [id number]    YOB: 1945  Age: 75 y.o. Sex: female       Cardiovascular Function/Vital Signs  Visit Vitals  /64   Pulse 60   Temp 36.3 °C (97.4 °F)   Resp 13   Ht 5' 4\" (1.626 m)   Wt 87.3 kg (192 lb 6 oz)   SpO2 99%   BMI 33.02 kg/m²       Patient is status post Spinal anesthesia for Procedure(s):  LEFT TOTAL KNEE REPLACEMENT. Nausea/Vomiting: None    Postoperative hydration reviewed and adequate.     Pain:  Pain Scale 1: Numeric (0 - 10) (10/16/19 1501)  Pain Intensity 1: 0 (10/16/19 1501)   Managed    Neurological Status:   Neuro (WDL): (as stated) (10/16/19 1501)  Neuro  LLE Motor Response: No movement to any stimulus (10/16/19 1433)  RLE Motor Response: No movement to any stimulus (10/16/19 1433)   At baseline    Mental Status and Level of Consciousness: Alert and oriented to person, place, and time    Pulmonary Status:   O2 Device: Room air (10/16/19 1501)   Adequate oxygenation and airway patent    Complications related to anesthesia: None    Post-anesthesia assessment completed. No concerns    Signed By: Bren Berrios MD     October 16, 2019              Procedure(s):  LEFT TOTAL KNEE REPLACEMENT.    spinal    <BSHSIANPOST>    Vitals Value Taken Time   /64 10/16/2019  3:00 PM   Temp 36.3 °C (97.4 °F) 10/16/2019  3:01 PM   Pulse 60 10/16/2019  3:06 PM   Resp 15 10/16/2019  3:06 PM   SpO2 98 % 10/16/2019  3:06 PM   Vitals shown include unvalidated device data.

## 2019-10-16 NOTE — PROGRESS NOTES
Physical Therapy:     Attempted to see patient for PT evaluation s/p L TKA POD 0. Patient numb and unable to pump ankles. Will defer at this time and follow up tomorrow morning for PT services. Thank you.       Katy Lorenzana, PT, DPT

## 2019-10-16 NOTE — PERIOP NOTES
TRANSFER - OUT REPORT:    Verbal report given to 5 S RN Jamie(name) on Kyler Burkett  being transferred to 575(unit) for routine post - op       Report consisted of patients Situation, Background, Assessment and   Recommendations(SBAR). Time Pre op antibiotic given:1220  Anesthesia Stop time: 1430  Yee Present on Transfer to floor:n  Order for Yee on Chart:n  Discharge Prescriptions with Chart:n    Information from the following report(s) SBAR, OR Summary, Intake/Output, MAR and Cardiac Rhythm NSR was reviewed with the receiving nurse. Opportunity for questions and clarification was provided. Is the patient on 02? NO       L/Min        Other     Is the patient on a monitor? NO    Is the nurse transporting with the patient? NO    Surgical Waiting Area notified of patient's transfer from PACU?  YES, via volunteer Andi Mendoza    The following personal items collected during your admission accompanied patient upon transfer:   Dental Appliance: Dental Appliances: None  Vision:    Hearing Aid:    Jewelry:    Clothing: Clothing: (clothng bag placed on pt's stretcher in pacu)  Other Valuables:    Valuables sent to safe:

## 2019-10-16 NOTE — ANESTHESIA PROCEDURE NOTES
Spinal Block    Start time: 10/16/2019 11:02 AM  End time: 10/16/2019 12:06 PM  Performed by: Nathalie Arndt CRNA  Authorized by: Nathalie Arndt CRNA     Pre-procedure:   Indications: primary anesthetic  Preanesthetic Checklist: patient identified, risks and benefits discussed, anesthesia consent, site marked, patient being monitored and timeout performed    Timeout Time: 11:01          Spinal Block:   Patient Position:  Seated  Prep Region:  Lumbar  Prep: DuraPrep      Location:  L3-4  Technique:  Single shot        Needle:   Needle Type:  Pencan  Needle Gauge:  24 G        Events: CSF confirmed, no blood with aspiration and no paresthesia        Assessment:  Insertion:  Uncomplicated  Patient tolerance:  Patient tolerated the procedure well with no immediate complications

## 2019-10-17 LAB
ANION GAP SERPL CALC-SCNC: 4 MMOL/L (ref 5–15)
BUN SERPL-MCNC: 19 MG/DL (ref 6–20)
BUN/CREAT SERPL: 28 (ref 12–20)
CALCIUM SERPL-MCNC: 8.2 MG/DL (ref 8.5–10.1)
CHLORIDE SERPL-SCNC: 105 MMOL/L (ref 97–108)
CO2 SERPL-SCNC: 27 MMOL/L (ref 21–32)
CREAT SERPL-MCNC: 0.67 MG/DL (ref 0.55–1.02)
GLUCOSE BLD STRIP.AUTO-MCNC: 142 MG/DL (ref 65–100)
GLUCOSE BLD STRIP.AUTO-MCNC: 156 MG/DL (ref 65–100)
GLUCOSE BLD STRIP.AUTO-MCNC: 157 MG/DL (ref 65–100)
GLUCOSE BLD STRIP.AUTO-MCNC: 166 MG/DL (ref 65–100)
GLUCOSE SERPL-MCNC: 161 MG/DL (ref 65–100)
HGB BLD-MCNC: 10.6 G/DL (ref 11.5–16)
POTASSIUM SERPL-SCNC: 4 MMOL/L (ref 3.5–5.1)
SERVICE CMNT-IMP: ABNORMAL
SODIUM SERPL-SCNC: 136 MMOL/L (ref 136–145)

## 2019-10-17 PROCEDURE — 74011636637 HC RX REV CODE- 636/637: Performed by: PHYSICIAN ASSISTANT

## 2019-10-17 PROCEDURE — 85018 HEMOGLOBIN: CPT

## 2019-10-17 PROCEDURE — 74011250637 HC RX REV CODE- 250/637: Performed by: ORTHOPAEDIC SURGERY

## 2019-10-17 PROCEDURE — 74011250637 HC RX REV CODE- 250/637: Performed by: PHYSICIAN ASSISTANT

## 2019-10-17 PROCEDURE — 82962 GLUCOSE BLOOD TEST: CPT

## 2019-10-17 PROCEDURE — 80048 BASIC METABOLIC PNL TOTAL CA: CPT

## 2019-10-17 PROCEDURE — 97110 THERAPEUTIC EXERCISES: CPT | Performed by: PHYSICAL THERAPIST

## 2019-10-17 PROCEDURE — 97161 PT EVAL LOW COMPLEX 20 MIN: CPT | Performed by: PHYSICAL THERAPIST

## 2019-10-17 PROCEDURE — 97116 GAIT TRAINING THERAPY: CPT | Performed by: PHYSICAL THERAPIST

## 2019-10-17 PROCEDURE — 36415 COLL VENOUS BLD VENIPUNCTURE: CPT

## 2019-10-17 PROCEDURE — 74011250636 HC RX REV CODE- 250/636: Performed by: ORTHOPAEDIC SURGERY

## 2019-10-17 PROCEDURE — 99218 HC RM OBSERVATION: CPT

## 2019-10-17 RX ORDER — OMEPRAZOLE 20 MG/1
20 CAPSULE, DELAYED RELEASE ORAL EVERY EVENING
Status: DISCONTINUED | OUTPATIENT
Start: 2019-10-17 | End: 2019-10-17 | Stop reason: CLARIF

## 2019-10-17 RX ORDER — CELECOXIB 200 MG/1
200 CAPSULE ORAL DAILY
Qty: 30 CAP | Refills: 0 | Status: SHIPPED | OUTPATIENT
Start: 2019-10-17 | End: 2019-11-16

## 2019-10-17 RX ORDER — LISINOPRIL 10 MG/1
40 TABLET ORAL EVERY EVENING
Status: DISCONTINUED | OUTPATIENT
Start: 2019-10-17 | End: 2019-10-20 | Stop reason: HOSPADM

## 2019-10-17 RX ORDER — CLONAZEPAM 1 MG/1
1 TABLET ORAL
Status: DISCONTINUED | OUTPATIENT
Start: 2019-10-17 | End: 2019-10-20 | Stop reason: HOSPADM

## 2019-10-17 RX ORDER — OXYCODONE HYDROCHLORIDE 5 MG/1
5-10 TABLET ORAL
Qty: 60 TAB | Refills: 0 | Status: SHIPPED | OUTPATIENT
Start: 2019-10-17 | End: 2019-10-24

## 2019-10-17 RX ORDER — MONTELUKAST SODIUM 10 MG/1
10 TABLET ORAL
Status: DISCONTINUED | OUTPATIENT
Start: 2019-10-17 | End: 2019-10-20 | Stop reason: HOSPADM

## 2019-10-17 RX ORDER — ESCITALOPRAM OXALATE 10 MG/1
20 TABLET ORAL EVERY EVENING
Status: DISCONTINUED | OUTPATIENT
Start: 2019-10-17 | End: 2019-10-17 | Stop reason: SDUPTHER

## 2019-10-17 RX ORDER — ESCITALOPRAM OXALATE 10 MG/1
20 TABLET ORAL DAILY
Status: DISCONTINUED | OUTPATIENT
Start: 2019-10-17 | End: 2019-10-20 | Stop reason: HOSPADM

## 2019-10-17 RX ORDER — TRAZODONE HYDROCHLORIDE 100 MG/1
50 TABLET ORAL
Status: DISCONTINUED | OUTPATIENT
Start: 2019-10-17 | End: 2019-10-20 | Stop reason: HOSPADM

## 2019-10-17 RX ORDER — ATORVASTATIN CALCIUM 10 MG/1
10 TABLET, FILM COATED ORAL
Status: DISCONTINUED | OUTPATIENT
Start: 2019-10-17 | End: 2019-10-20 | Stop reason: HOSPADM

## 2019-10-17 RX ORDER — PANTOPRAZOLE SODIUM 40 MG/1
40 TABLET, DELAYED RELEASE ORAL
Status: DISCONTINUED | OUTPATIENT
Start: 2019-10-17 | End: 2019-10-20 | Stop reason: HOSPADM

## 2019-10-17 RX ORDER — LEVOTHYROXINE SODIUM 112 UG/1
112 TABLET ORAL
Status: DISCONTINUED | OUTPATIENT
Start: 2019-10-17 | End: 2019-10-20 | Stop reason: HOSPADM

## 2019-10-17 RX ORDER — HYDROCHLOROTHIAZIDE 25 MG/1
12.5 TABLET ORAL EVERY EVENING
Status: DISCONTINUED | OUTPATIENT
Start: 2019-10-17 | End: 2019-10-20 | Stop reason: HOSPADM

## 2019-10-17 RX ORDER — METFORMIN HYDROCHLORIDE 500 MG/1
500 TABLET, EXTENDED RELEASE ORAL
Status: DISCONTINUED | OUTPATIENT
Start: 2019-10-17 | End: 2019-10-20 | Stop reason: HOSPADM

## 2019-10-17 RX ADMIN — TRAZODONE HYDROCHLORIDE 50 MG: 100 TABLET ORAL at 22:02

## 2019-10-17 RX ADMIN — MONTELUKAST 10 MG: 10 TABLET, FILM COATED ORAL at 22:01

## 2019-10-17 RX ADMIN — OXYCODONE HYDROCHLORIDE 5 MG: 5 TABLET ORAL at 01:22

## 2019-10-17 RX ADMIN — HYDROCHLOROTHIAZIDE 12.5 MG: 25 TABLET ORAL at 17:46

## 2019-10-17 RX ADMIN — OXYCODONE HYDROCHLORIDE 10 MG: 5 TABLET ORAL at 19:10

## 2019-10-17 RX ADMIN — OXYCODONE HYDROCHLORIDE 5 MG: 5 TABLET ORAL at 15:09

## 2019-10-17 RX ADMIN — Medication 2 G: at 04:21

## 2019-10-17 RX ADMIN — LISINOPRIL 40 MG: 10 TABLET ORAL at 17:46

## 2019-10-17 RX ADMIN — CELECOXIB 200 MG: 200 CAPSULE ORAL at 09:29

## 2019-10-17 RX ADMIN — CLONAZEPAM 1 MG: 1 TABLET ORAL at 22:01

## 2019-10-17 RX ADMIN — METFORMIN HYDROCHLORIDE 500 MG: 500 TABLET, EXTENDED RELEASE ORAL at 17:47

## 2019-10-17 RX ADMIN — OXYCODONE HYDROCHLORIDE 10 MG: 5 TABLET ORAL at 11:29

## 2019-10-17 RX ADMIN — INSULIN LISPRO 2 UNITS: 100 INJECTION, SOLUTION INTRAVENOUS; SUBCUTANEOUS at 12:38

## 2019-10-17 RX ADMIN — INSULIN LISPRO 2 UNITS: 100 INJECTION, SOLUTION INTRAVENOUS; SUBCUTANEOUS at 16:30

## 2019-10-17 RX ADMIN — ACETAMINOPHEN 650 MG: 325 TABLET, FILM COATED ORAL at 23:16

## 2019-10-17 RX ADMIN — OXYCODONE HYDROCHLORIDE 10 MG: 5 TABLET ORAL at 04:21

## 2019-10-17 RX ADMIN — ACETAMINOPHEN 650 MG: 325 TABLET, FILM COATED ORAL at 11:29

## 2019-10-17 RX ADMIN — POLYETHYLENE GLYCOL 3350 17 G: 17 POWDER, FOR SOLUTION ORAL at 09:30

## 2019-10-17 RX ADMIN — OXYCODONE HYDROCHLORIDE 5 MG: 5 TABLET ORAL at 14:37

## 2019-10-17 RX ADMIN — INSULIN LISPRO 2 UNITS: 100 INJECTION, SOLUTION INTRAVENOUS; SUBCUTANEOUS at 07:37

## 2019-10-17 RX ADMIN — ATORVASTATIN CALCIUM 10 MG: 10 TABLET, FILM COATED ORAL at 22:01

## 2019-10-17 RX ADMIN — ACETAMINOPHEN 650 MG: 325 TABLET, FILM COATED ORAL at 04:21

## 2019-10-17 RX ADMIN — OXYCODONE HYDROCHLORIDE 10 MG: 5 TABLET ORAL at 23:16

## 2019-10-17 RX ADMIN — CELECOXIB 200 MG: 200 CAPSULE ORAL at 22:01

## 2019-10-17 RX ADMIN — RIVAROXABAN 10 MG: 10 TABLET, FILM COATED ORAL at 11:29

## 2019-10-17 RX ADMIN — PANTOPRAZOLE SODIUM 40 MG: 40 TABLET, DELAYED RELEASE ORAL at 22:01

## 2019-10-17 RX ADMIN — Medication 10 ML: at 15:09

## 2019-10-17 RX ADMIN — LEVOTHYROXINE SODIUM 112 MCG: 112 TABLET ORAL at 09:28

## 2019-10-17 RX ADMIN — SENNOSIDES, DOCUSATE SODIUM 1 TABLET: 50; 8.6 TABLET, FILM COATED ORAL at 17:46

## 2019-10-17 RX ADMIN — OXYCODONE HYDROCHLORIDE 5 MG: 5 TABLET ORAL at 00:42

## 2019-10-17 RX ADMIN — ESCITALOPRAM OXALATE 20 MG: 10 TABLET ORAL at 12:46

## 2019-10-17 RX ADMIN — SENNOSIDES, DOCUSATE SODIUM 1 TABLET: 50; 8.6 TABLET, FILM COATED ORAL at 09:29

## 2019-10-17 RX ADMIN — OXYCODONE HYDROCHLORIDE 10 MG: 5 TABLET ORAL at 08:35

## 2019-10-17 RX ADMIN — ACETAMINOPHEN 650 MG: 325 TABLET, FILM COATED ORAL at 17:47

## 2019-10-17 NOTE — PROGRESS NOTES
TOTAL KNEE PROGRESS NOTE      Subjective:     Post-Operative Day: 1 Status Post Left Total Knee Arthroplasty  Systemic or Specific Complaints:No Complaints    Objective:     Patient Vitals for the past 24 hrs:   BP Temp Pulse Resp SpO2 Height Weight   10/17/19 0839 101/61 99.6 °F (37.6 °C) 61 16 93 %     10/17/19 0351 140/66 98.4 °F (36.9 °C) 71 15 94 %     10/17/19 0005 119/72 98.1 °F (36.7 °C) 72 16 94 %     10/16/19 1950 151/82 98 °F (36.7 °C) 66 16 93 %     10/16/19 1945 152/86         10/16/19 1744 134/78 98.8 °F (37.1 °C) 67 16 95 %     10/16/19 1630 124/66 98.1 °F (36.7 °C) 66 16 95 %     10/16/19 1540 118/60 98.1 °F (36.7 °C) (!) 56 16 95 %     10/16/19 1505   60 13 99 %     10/16/19 1501  97.4 °F (36.3 °C)        10/16/19 1500 134/64  (!) 58 12 98 %     10/16/19 1455   (!) 58 13 98 %     10/16/19 1450   60 14 98 %     10/16/19 1445 139/67  62 13 98 %     10/16/19 1440 139/70  (!) 59 12 98 %     10/16/19 1435 134/67  60 14 98 %     10/16/19 1426 135/66 97.4 °F (36.3 °C) 63 9 98 %     10/16/19 1153 113/59  (!) 58 14 96 %     10/16/19 1148 120/58  (!) 58 14 97 %     10/16/19 1143 119/56  (!) 57 16 97 %     10/16/19 1049 128/66 98.7 °F (37.1 °C) 63 17 94 %     10/16/19 1038      5' 4\" (1.626 m) 87.3 kg (192 lb 6 oz)       General: alert, cooperative, no distress, appears stated age   Wound: Wound clean and dry no evidence of infection. , No Erythema, No Edema, No Drainage and Wound Intact   Motion: Extension: Full Extension   DVT Exam: No evidence of DVT seen on physical exam.  Negative Bradley's sign. No cords or calf tenderness. No significant calf/ankle edema.      Data Review:    Recent Results (from the past 24 hour(s))   GLUCOSE, POC    Collection Time: 10/16/19 11:36 AM   Result Value Ref Range    Glucose (POC) 117 (H) 65 - 100 mg/dL    Performed by 1700  Westley Hilliard, POC    Collection Time: 10/16/19 10:08 PM   Result Value Ref Range    Glucose (POC) 152 (H) 65 - 100 mg/dL    Performed by Bonnie JEFFRIES    HEMOGLOBIN    Collection Time: 10/17/19  4:01 AM   Result Value Ref Range    HGB 10.6 (L) 11.5 - 01.6 g/dL   METABOLIC PANEL, BASIC    Collection Time: 10/17/19  4:01 AM   Result Value Ref Range    Sodium 136 136 - 145 mmol/L    Potassium 4.0 3.5 - 5.1 mmol/L    Chloride 105 97 - 108 mmol/L    CO2 27 21 - 32 mmol/L    Anion gap 4 (L) 5 - 15 mmol/L    Glucose 161 (H) 65 - 100 mg/dL    BUN 19 6 - 20 MG/DL    Creatinine 0.67 0.55 - 1.02 MG/DL    BUN/Creatinine ratio 28 (H) 12 - 20      GFR est AA >60 >60 ml/min/1.73m2    GFR est non-AA >60 >60 ml/min/1.73m2    Calcium 8.2 (L) 8.5 - 10.1 MG/DL   GLUCOSE, POC    Collection Time: 10/17/19  6:53 AM   Result Value Ref Range    Glucose (POC) 156 (H) 65 - 100 mg/dL    Performed by Austin Ospina          Assessment:     Status Post left Total Knee Arthroplasty. Doing well postoperatively. . Bandage aquacel, in post-op ACE. Labs stable. PT to progress. Pain control improved from yesterday. Swelling minimal.     Plan:     Continues current post-op course  Continue PT per protocol  Plan to discharge to Home [facility]. today vs tomorrow with PT clearance.

## 2019-10-17 NOTE — BRIEF OP NOTE
BRIEF OPERATIVE NOTE    Date of Procedure: 10/16/2019   Preoperative Diagnosis: DJD LEFT KNEE  Postoperative Diagnosis: DJD LEFT KNEE    Procedure(s):  LEFT TOTAL KNEE REPLACEMENT  Surgeon(s) and Role:     Pedro Hope MD - Primary         Surgical Assistant: Jennifer Phillip PA-C    Surgical Staff:  Circ-1: Ko Anderson  Circ-Relief: Pat Mancilla RN  Physician Assistant: Bertha Monzon PA-C  Scrub Tech-1: Medina Pederson  Scrub Tech-2: Desire Gray  Scrub RN-Relief: He Lopez RN  Surg Asst-1: Eliseo Hernandez  Event Time In Time Out   Incision Start 1242    Incision Close 1415      Anesthesia: Spinal   Estimated Blood Loss: 200 mL  Specimens: * No specimens in log *   Findings: DJD left knee   Complications: None. Implants:   Implant Name Type Inv.  Item Serial No.  Lot No. LRB No. Used Action   CEMENT BNE GENTAMC GHV 40GM -- SMARTSET - SNA  CEMENT BNE GENTAMC GHV 40GM -- SMARTSET NA [facility] [employer] [id number] Left 2 Implanted   BEARING TIB BASE FIX SZ4 DEANA -- ATTUNE - SNA  BEARING TIB BASE FIX SZ4 DEANA -- ATTUNE NA [facility] [employer] [id number] Left 1 Implanted   FEM PS SZ 4 LT DEANA -- ATTUNE - SNA  FEM PS SZ 4 LT DEANA -- ATTUNE NA [facility] [employer] [id number] Left 1 Implanted   PAT DEANA DOME MEDIAL 35MM -- ATTUNE - SNA  PAT DEANA DOME MEDIAL 35MM -- ATTUNE NA [facility] [employer] [id number] Left 1 Implanted   INSERT TIB FB PS SZ 4 5MM -- ATTUNE - SNA  INSERT TIB FB PS SZ 4 5MM -- ATTUNE NA [facility] [employer] [id number] Left 1 Implanted

## 2019-10-17 NOTE — PROGRESS NOTES
Bedside and Verbal shift change report given to Cosme Luciano (oncoming nurse) by Lane Figueroa (offgoing nurse). Report included the following information SBAR.

## 2019-10-17 NOTE — ROUTINE PROCESS
Bedside shift change report given to Kimmy Schwartz (oncoming nurse) by Calderon Zimmer (offgoing nurse). Report included the following information SBAR.

## 2019-10-17 NOTE — PROGRESS NOTES
RIGOBERTO: Home with Home Health- Referral sent to [facility] via [address] and accepted. Per the patient her son will transport her home. Discharge planning for tomorrow pending therapy clearance. Care Management Interventions  PCP Verified by CM: Yes  Palliative Care Criteria Met (RRAT>21 & CHF Dx)?: No  Mode of Transport at Discharge: Self(son/car)  Transition of Care Consult (CM Consult): [address]: Yes  MyChart Signup: No  Discharge Durable Medical Equipment: No(Has RW at home)  Health Maintenance Reviewed: Yes  Physical Therapy Consult: Yes  Occupational Therapy Consult: Yes  Speech Therapy Consult: No  Current Support Network: Lives Alone  Confirm Follow Up Transport: Family  Plan discussed with Pt/Family/Caregiver: Yes  Freedom of Choice Offered: Yes  [place] Resource Information Provided?: No  Discharge Location  Discharge Placement: Home with home health     Reason for Admission:   Left Total Knee Replacement                  RRAT Score:     15             Do you (patient/family) have any concerns for transition/discharge? No, the patient states that she is not ready to discharge today              Plan for utilizing home health:   Yes, referral sent to [facility]    Current Advanced Directive/Advance Care Plan:  Full Code-ACP not on file-patient declined assistance. CRM met with the patient explained role and offered assistance. The patient plans to discharge tomorrow if cleared by therapy with Home Health. [facility] offered/signed FOC letter placed on the chart. The patient did not have a preference for agency. CRM sent referral to [facility] via [address] The patient stated that her son will transport her home by car at discharge. The patient has a RW at home. The patient confirmed PCP-Dr. Damian Hurt. The patient does not have any problems filling her prescriptions.        Virtual reviewer communicated change to CM which reflect outpatient observation order written prior to discharge order being written. Code 44 delivered to patient. CM met with the patient and provided to the patient the printed information about her outpatient observation status. The patient was given the flyer entitled, \"Medicare Outpatient Observation: Information & notification. \" All questions were answered, no additional discharge needs identified at this time and patient expects to return to their (home, assisted living facility, relatives home, etc.) after discharge today. Oral and Written notification given to patient and/or caregiver informing them that they are currently an Outpatient receiving care in our facility. Outpatient services include Observation Services under [facility] requirements. [facility] accepted the case.        Gregoria Manzanares, [address]   [phone]

## 2019-10-17 NOTE — PROGRESS NOTES
Bedside and Verbal shift change report given to [facility] (oncoming nurse) by [facility] (offgoing nurse). Report included the following information SBAR, Kardex and MAR.

## 2019-10-17 NOTE — OP NOTES
[address]   OPERATIVE REPORT    Name:  Anne-Marie Thakur  MR#:  [id number]  :  1945  ACCOUNT #:  [de-identified]  DATE OF SERVICE:  10/16/2019      PREOPERATIVE DIAGNOSIS:  Advanced degenerative arthritis of left knee. POSTOPERATIVE DIAGNOSIS:  Advanced degenerative arthritis of left knee. PROCEDURE PERFORMED:  Left total knee replacement. SURGEON:  Tamara Liu MD    ASSISTANT:  Korey Bonner PA-C    ANESTHESIA:  Regional block plus spinal.    COMPLICATIONS:  None. SPECIMENS REMOVED:  Tibial and femoral bone fragments. IMPLANTS:  Left total knee components as listed in the operative report. ESTIMATED BLOOD LOSS:  200 mL. DRAINS:  None. INDICATIONS:  The patient has advanced degenerative arthritis of her left knee. She has failed conservative treatment and now presents for the above procedure. PROCEDURE:  On the day of operation, the patient was taken to the holding area and had regional block administered. The patient was taken to the operating room and had spinal anesthesia administered. The patient was placed in the supine position. The left lower extremity was prepped and draped in the usual fashion. After exsanguination with an Esmarch, tourniquet inflated to 285 mmHg. Antibiotics were given. After appropriate consent confirmation, a midline longitudinal incision was made over the knee and was carried through the subcutaneous tissue. A medial parapatellar capsular incision made. Advanced degenerative changes were noted throughout the knee. The knee was debrided of osteophytes and soft tissue. A drill was used to gain access to the femoral canal.  Distal femoral cutting guide assembled with a 5-degree valgus cut. The distal femoral cut made with an oscillating saw. The femur was sized and felt to be a #4 in the Attune system. Anterior and posterior cuts were made. Chamfer cuts were made.   Box cut for the posterior stabilized prosthesis made. The external tibial alignment guide assembled. Tibial plateau cut was made with an oscillating saw. Flexion and extension gaps were evaluated and felt to be appropriate. The tibia was sized and felt to be #4 in the Attune system. Drill and punch were used to prepare the tibial plateau. The patella was prepared with an oscillating saw and sized for 35 mm. The 5 mm trial insert provided the best fit, range of motion, with proper alignment and proper tracking of the patella. The ligaments were felt to be properly balanced. The trial components were then removed. The knee was sterilely irrigated with pulse irrigation as well as antibiotic solution. The components were cemented into place with antibiotic impregnated cement. A 5 mm trial insert was put into place. Soft tissues infiltrated with Exparel local anesthetic. After the cement matured, the 5 mm insert was put into place and felt to be stable. Tourniquet released. Coagulation achieved with electrocautery. The capsule repaired with #1 Vicryl, supplemented with #2 PDS, 2-0 Vicryl was used to close the subcutaneous tissue and staples used to close the skin. Sterile dressings applied. The patient was taken to recovery room in satisfactory condition. The assistant, Hugh Franklin PA-C, assisted me with positioning, retraction, implant installation, and incision closure.         Mar Montoya MD      [id number]  D:  10/17/2019 10:41  T:  10/17/2019 11:48  JOB #:  [id number]  CC:  Lynnann Runner, MD Sumner Colla, MD

## 2019-10-17 NOTE — PROGRESS NOTES
Problem: Falls - Risk of  Goal: *Absence of Falls  Description  Document Rena Bueno Fall Risk and appropriate interventions in the flowsheet.   Outcome: Progressing Towards Goal  Note:   Fall Risk Interventions:  Mobility Interventions: Patient to call before getting OOB         Medication Interventions: Patient to call before getting OOB    Elimination Interventions: Call light in reach    History of Falls Interventions: Consult care management for discharge planning

## 2019-10-17 NOTE — PROGRESS NOTES
Problem: Mobility Impaired (Adult and Pediatric)  Goal: *Acute Goals and Plan of Care (Insert Text)  Description  FUNCTIONAL STATUS PRIOR TO ADMISSION: Patient was independent and active without use of DME.    HOME SUPPORT PRIOR TO ADMISSION: The patient lived alone with no local support. Physical Therapy Goals  Initiated 10/17/2019    1. Patient will move from supine to sit and sit to supine  in bed with modified independence within 4 days. 2. Patient will perform sit to stand with modified independence within 4 days. 3. Patient will ambulate with modified independence for 200 feet with the least restrictive device within 4 days. 4. Patient will ascend/descend 1 stairs with 2 handrail(s) with modified independence within 4 days. 5. Patient will perform home exercise program per protocol with modified independence within 4 days. 6. Patient will demonstrate AROM 0-90 degrees in operative joint within 4 days. 10/17/2019 1512 by Irina Cha, PT, DPT  Outcome: Progressing Towards Goal   PHYSICAL THERAPY TREATMENT  Patient: Gonzalez Rice (2 y.o. female)  Date: 10/17/2019  Diagnosis: Primary localized osteoarthritis of left knee [M17.12]  Primary localized osteoarthritis of left knee [M17.12] Primary localized osteoarthritis of left knee  Procedure(s) (LRB):  LEFT TOTAL KNEE REPLACEMENT (Left) 1 Day Post-Op  Precautions: Fall, WBAT  Chart, physical therapy assessment, plan of care and goals were reviewed. ASSESSMENT  Patient making steady progress toward goals. Patient declines to participate with ambulation as she is very fatigued after having pain meds as well as anxiety medicine. Able to participate with all exercises and ice applied. Will plan to address stairs in the AM in prep to MT home. Will continue to follow.     Other factors to consider for discharge: pain limits mobility at times         PLAN :  Patient continues to benefit from skilled intervention to address the above impairments. Continue treatment per established plan of care. to address goals. Recommendation for discharge: (in order for the patient to meet his/her long term goals)  Physical therapy at least 2 days/week in the home       Equipment recommendations for successful discharge (if) home: patient owns DME required for discharge       SUBJECTIVE:   Patient stated I can't do it right now. I'm so sleepy.     OBJECTIVE DATA SUMMARY:   Critical Behavior:  Neurologic State: Alert  Orientation Level: Oriented X4          Range of Motion:  AROM: Generally decreased, functional                   Therapeutic Exercises:     EXERCISE   Sets   Reps   Active Active Assist   Passive Self ROM   Comments   Ankle Pumps 1 10 ?                                        ?                                        ?                                        ?                                           Quad Sets 1 10 ?                                        ?                                        ?                                        ?                                           Hamstring Sets   ? ?                                        ?                                        ?                                           Short Arc Quads   ? ?                                        ?                                        ?                                           Knee Extension Stretch     ? ?                                          ?                                          ?                                           Heel Slides 1 10 ?                                        ?                                        ?                                        ?                                           Long Arc Quads   ?                                         ?                                        ? ?                                           Knee Flexion Stretch 1 3 ?                                        ?                                        ?                                        ?                                           Straight Leg Raises   ? ?                                        ?                                        ?                                               Pain Rating:  Reports 3/10 pain    Activity Tolerance:   Fair  Please refer to the flowsheet for vital signs taken during this treatment.     After treatment patient left in no apparent distress:   Supine in bed, Call bell within reach and Side rails x 3    COMMUNICATION/COLLABORATION:   The patients plan of care was discussed with: Physical Therapist, Occupational Therapist and Registered Nurse    Miles Connor, PT, DPT   Time Calculation: 12 mins

## 2019-10-17 NOTE — PROGRESS NOTES
Patient assessed for readiness to ambulate. Vital Signs  Level of Consciousness: Alert  Temp: 98 °F (36.7 °C)  Temp Source: Oral  Pulse (Heart Rate): 66  Heart Rate Source: Monitor  Cardiac Rhythm: Sinus bradycardia  Resp Rate: 16  BP: 151/82  MAP (Monitor): 82  MAP (Calculated): 105  BP 1 Location: Right arm  BP 1 Method: Automatic  BP Patient Position: Standing  MEWS Score: 1. Patient ambulated with assistance of 2 nurses. Orthostatic  Patient ambulated with gait belt and walker. Patient walked to sidestepped to Head of Bed and few steps to bedside commode. Patient returned safely to bed.      Patient Vitals for the past 4 hrs:   Temp Pulse Resp BP SpO2   10/16/19 1950 98 °F (36.7 °C) 66 16 151/82 93 %   10/16/19 1945    152/86    10/16/19 1744 98.8 °F (37.1 °C) 67 16 134/78 95 %

## 2019-10-17 NOTE — PROGRESS NOTES
Problem: Mobility Impaired (Adult and Pediatric)  Goal: *Acute Goals and Plan of Care (Insert Text)  Description  FUNCTIONAL STATUS PRIOR TO ADMISSION: Patient was independent and active without use of DME.    HOME SUPPORT PRIOR TO ADMISSION: The patient lived alone with no local support. Physical Therapy Goals  Initiated 10/17/2019    1. Patient will move from supine to sit and sit to supine  in bed with modified independence within 4 days. 2. Patient will perform sit to stand with modified independence within 4 days. 3. Patient will ambulate with modified independence for 200 feet with the least restrictive device within 4 days. 4. Patient will ascend/descend 1 stairs with 2 handrail(s) with modified independence within 4 days. 5. Patient will perform home exercise program per protocol with modified independence within 4 days. 6. Patient will demonstrate AROM 0-90 degrees in operative joint within 4 days. Outcome: Progressing Towards Goal   PHYSICAL THERAPY EVALUATION  Patient: Smitha Vargas (78 y.o. female)  Date: 10/17/2019  Primary Diagnosis: Primary localized osteoarthritis of left knee [M17.12]  Primary localized osteoarthritis of left knee [M17.12]  Procedure(s) (LRB):  LEFT TOTAL KNEE REPLACEMENT (Left) 1 Day Post-Op   Precautions:   Fall, WBAT      ASSESSMENT  Based on the objective data described below, the patient presents with decreased functional mobility from baseline level of function. Patient currently needing CGA for transfers and gait with RW. Ambulates very slowly with step-to pattern secondary to high pain levels in the knee. Decreased stance on the R and needs cues for RW management. Patient will likely need additional night in the hospital to ensure independence prior to DC home. Will continue to follow. Current Level of Function Impacting Discharge (mobility/balance):  CGA for transfers, Aria for sit to supine, CGA to amb with RW     Other factors to consider for discharge: pain, will have intermittent assistance at home     Patient will benefit from skilled therapy intervention to address the above noted impairments. PLAN :  Recommendations and Planned Interventions: bed mobility training, transfer training, gait training, therapeutic exercises, neuromuscular re-education, patient and family training/education and therapeutic activities      Frequency/Duration: Patient will be followed by physical therapy:  twice daily to address goals. Recommendation for discharge: (in order for the patient to meet his/her long term goals)  Physical therapy at least 2 days/week in the home       Equipment recommendations for successful discharge (if) home: patient owns DME required for discharge         SUBJECTIVE:   Patient stated It's really hurting when I put weight on it.     OBJECTIVE DATA SUMMARY:   HISTORY:    Past Medical History:   Diagnosis Date    Anxiety state, unspecified     Arthritis     knes and back , foot osteoarthritis    Asthma     Diabetes ([facility]     Hypertension     Hypothyroidism     Menopause     LMP-45years old? Morbid obesity ([facility] [address]     Psychiatric disorder     ANXIETY, DEPRESSION    Seizures ([facility] [address]     had a seizure as a result of medication about 50 years ago    Vitamin D deficiency 5/11/2010     Past Surgical History:   Procedure Laterality Date    BREAST SURGERY PROCEDURE UNLISTED      implants for deformity, and removed    ENDOSCOPY, COLON, DIAGNOSTIC  9/13/2010    -due again 9/2015    HX CHOLECYSTECTOMY      HX GI  06/2019    COLONOSCOPY    HX HERNIA REPAIR      x 2    HX TONSILLECTOMY      IMPLANT BREAST SILICONE/EQ Bilateral 3589    Lt implant was removed due to leakage.        Personal factors and/or comorbidities impacting plan of care:     Home Situation  Home Environment: Private residence  # Steps to Enter: 1  One/Two Story Residence: Two story, live on 1st floor(temp bedroom setup downstairs)  Living Alone: No  Support Systems: Child(adama), Friends \ neighbors  Patient Expects to be Discharged to[de-identified] Private residence  Current DME Used/Available at Home: trini Castillo    EXAMINATION/PRESENTATION/DECISION MAKING:   Critical Behavior:  Neurologic State: Alert  Orientation Level: Oriented X4        Hearing: Auditory  Auditory Impairment: None    Range Of Motion:  AROM: Generally decreased, functional                       Strength:    Strength: Generally decreased, functional                    Tone & Sensation:                                  Coordination:     Vision:      Functional Mobility:  Bed Mobility:        Sit to Supine: Minimum assistance; Additional time;Assist x1  Scooting: Supervision; Additional time  Transfers:  Sit to Stand: Contact guard assistance  Stand to Sit: Contact guard assistance                       Balance:   Sitting: Intact  Standing: Impaired  Standing - Static: Constant support;Good  Standing - Dynamic : Constant support; Fair  Ambulation/Gait Training:  Distance (ft): 80 Feet (ft)  Assistive Device: Gait belt;Walker, rolling  Ambulation - Level of Assistance: Contact guard assistance;Assist x1     Gait Description (WDL): Exceptions to WDL  Gait Abnormalities: Antalgic;Decreased step clearance; Step to gait     Left Side Weight Bearing: As tolerated  Base of Support: Center of gravity altered; Widened  Stance: Left decreased  Speed/Lesa: Pace decreased (<100 feet/min); Slow  Step Length: Left shortened;Right shortened    Functional Measure:  Barthel Index:    Bathin  Bladder: 10  Bowels: 10  Groomin  Dressin  Feeding: 10  Mobility: 10  Stairs: 5  Toilet Use: 5  Transfer (Bed to Chair and Back): 10  Total: 70/100       The Barthel ADL Index: Guidelines  1. The index should be used as a record of what a patient does, not as a record of what a patient could do. 2. The main aim is to establish degree of independence from any help, physical or verbal, however minor and for whatever reason.   3. The need for supervision renders the patient not independent. 4. A patient's performance should be established using the best available evidence. Asking the patient, friends/relatives and nurses are the usual sources, but direct observation and common sense are also important. However direct testing is not needed. 5. Usually the patient's performance over the preceding 24-48 hours is important, but occasionally longer periods will be relevant. 6. Middle categories imply that the patient supplies over 50 per cent of the effort. 7. Use of aids to be independent is allowed. Misha Richard., Barthel, D.W. (0413). Functional evaluation: the Barthel Index. [address] [facility] (14)2. Arianne Ellis abisai MASTER Mueller, Divina Gaytan., Adam Larson., [place], [address] [place] [address] Ave (1999). Measuring the change indisability after inpatient rehabilitation; comparison of the responsiveness of the Barthel Index and Functional Costilla Measure. Journal of Neurology, Neurosurgery, and Psychiatry, 66(4), 356-496. Peng Pratt, [place]PETER, ROSANNE Navarro, & Mojgan Chan M.A. (2004.) Assessment of post-stroke quality of life in cost-effectiveness studies: The usefulness of the Barthel Index and the EuroQoL-5D. Quality of Life Research, 13, 427-43       Pain Rating:  Patient with pain during ambulation but does not rate    Activity Tolerance:   Fair and requires rest breaks  Please refer to the flowsheet for vital signs taken during this treatment. After treatment patient left in no apparent distress:   Supine in bed, Call bell within reach and Side rails x 3    COMMUNICATION/EDUCATION:   The patients plan of care was discussed with: Physical Therapist, Occupational Therapist and Registered Nurse. Fall prevention education was provided and the patient/caregiver indicated understanding., Patient/family have participated as able in goal setting and plan of care. and Patient/family agree to work toward stated goals and plan of care.     Thank you for this referral.  Darrel Pereyra, PT, DPT   Time Calculation: 25 mins

## 2019-10-18 ENCOUNTER — HOME HEALTH ADMISSION (OUTPATIENT)
Dept: HOME HEALTH SERVICES | Facility: HOME HEALTH | Age: 74
End: 2019-10-18
Payer: MEDICARE

## 2019-10-18 LAB
GLUCOSE BLD STRIP.AUTO-MCNC: 114 MG/DL (ref 65–100)
GLUCOSE BLD STRIP.AUTO-MCNC: 122 MG/DL (ref 65–100)
GLUCOSE BLD STRIP.AUTO-MCNC: 139 MG/DL (ref 65–100)
GLUCOSE BLD STRIP.AUTO-MCNC: 163 MG/DL (ref 65–100)
SERVICE CMNT-IMP: ABNORMAL

## 2019-10-18 PROCEDURE — 74011250637 HC RX REV CODE- 250/637: Performed by: ORTHOPAEDIC SURGERY

## 2019-10-18 PROCEDURE — 82962 GLUCOSE BLOOD TEST: CPT

## 2019-10-18 PROCEDURE — 99218 HC RM OBSERVATION: CPT

## 2019-10-18 PROCEDURE — 97116 GAIT TRAINING THERAPY: CPT

## 2019-10-18 PROCEDURE — 97530 THERAPEUTIC ACTIVITIES: CPT

## 2019-10-18 PROCEDURE — 74011250637 HC RX REV CODE- 250/637: Performed by: PHYSICIAN ASSISTANT

## 2019-10-18 RX ADMIN — CELECOXIB 200 MG: 200 CAPSULE ORAL at 08:19

## 2019-10-18 RX ADMIN — OXYCODONE HYDROCHLORIDE 5 MG: 5 TABLET ORAL at 18:00

## 2019-10-18 RX ADMIN — OXYCODONE HYDROCHLORIDE 10 MG: 5 TABLET ORAL at 03:10

## 2019-10-18 RX ADMIN — METFORMIN HYDROCHLORIDE 500 MG: 500 TABLET, EXTENDED RELEASE ORAL at 18:00

## 2019-10-18 RX ADMIN — MONTELUKAST 10 MG: 10 TABLET, FILM COATED ORAL at 22:05

## 2019-10-18 RX ADMIN — ACETAMINOPHEN 650 MG: 325 TABLET, FILM COATED ORAL at 12:51

## 2019-10-18 RX ADMIN — ATORVASTATIN CALCIUM 10 MG: 10 TABLET, FILM COATED ORAL at 22:04

## 2019-10-18 RX ADMIN — ACETAMINOPHEN 650 MG: 325 TABLET, FILM COATED ORAL at 18:00

## 2019-10-18 RX ADMIN — SENNOSIDES, DOCUSATE SODIUM 1 TABLET: 50; 8.6 TABLET, FILM COATED ORAL at 18:00

## 2019-10-18 RX ADMIN — PANTOPRAZOLE SODIUM 40 MG: 40 TABLET, DELAYED RELEASE ORAL at 22:05

## 2019-10-18 RX ADMIN — ACETAMINOPHEN 650 MG: 325 TABLET, FILM COATED ORAL at 07:14

## 2019-10-18 RX ADMIN — CELECOXIB 200 MG: 200 CAPSULE ORAL at 22:04

## 2019-10-18 RX ADMIN — OXYCODONE HYDROCHLORIDE 5 MG: 5 TABLET ORAL at 13:43

## 2019-10-18 RX ADMIN — CLONAZEPAM 1 MG: 1 TABLET ORAL at 22:05

## 2019-10-18 RX ADMIN — LEVOTHYROXINE SODIUM 112 MCG: 112 TABLET ORAL at 07:14

## 2019-10-18 RX ADMIN — TRAZODONE HYDROCHLORIDE 50 MG: 100 TABLET ORAL at 22:05

## 2019-10-18 RX ADMIN — ESCITALOPRAM OXALATE 20 MG: 10 TABLET ORAL at 08:19

## 2019-10-18 RX ADMIN — POLYETHYLENE GLYCOL 3350 17 G: 17 POWDER, FOR SOLUTION ORAL at 08:19

## 2019-10-18 RX ADMIN — SENNOSIDES, DOCUSATE SODIUM 1 TABLET: 50; 8.6 TABLET, FILM COATED ORAL at 08:19

## 2019-10-18 RX ADMIN — RIVAROXABAN 10 MG: 10 TABLET, FILM COATED ORAL at 12:51

## 2019-10-18 RX ADMIN — OXYCODONE HYDROCHLORIDE 5 MG: 5 TABLET ORAL at 10:32

## 2019-10-18 RX ADMIN — Medication 10 ML: at 18:00

## 2019-10-18 NOTE — PROGRESS NOTES
Problem: Diabetes Self-Management  Goal: *Disease process and treatment process  Description  Define diabetes and identify own type of diabetes; list 3 options for treating diabetes. Outcome: Progressing Towards Goal  Goal: *Monitoring blood glucose, interpreting and using results  Description  Identify recommended blood glucose targets  and personal targets.   Outcome: Progressing Towards Goal

## 2019-10-18 NOTE — PROGRESS NOTES
Problem: Mobility Impaired (Adult and Pediatric)  Goal: *Acute Goals and Plan of Care (Insert Text)  Description  FUNCTIONAL STATUS PRIOR TO ADMISSION: Patient was independent and active without use of DME.    HOME SUPPORT PRIOR TO ADMISSION: The patient lived alone with no local support. Physical Therapy Goals  Initiated 10/17/2019    1. Patient will move from supine to sit and sit to supine  in bed with modified independence within 4 days. 2. Patient will perform sit to stand with modified independence within 4 days. 3. Patient will ambulate with modified independence for 200 feet with the least restrictive device within 4 days. 4. Patient will ascend/descend 1 stairs with 2 handrail(s) with modified independence within 4 days. 5. Patient will perform home exercise program per protocol with modified independence within 4 days. 6. Patient will demonstrate AROM 0-90 degrees in operative joint within 4 days. 10/18/2019 1505 by Chantel Saunders PT  Outcome: Progressing Towards Goal   PHYSICAL THERAPY TREATMENT  Patient: Bell Delgado (76 y.o. female)  Date: 10/18/2019  Diagnosis: Primary localized osteoarthritis of left knee [M17.12]  Primary localized osteoarthritis of left knee [M17.12] Primary localized osteoarthritis of left knee  Procedure(s) (LRB):  LEFT TOTAL KNEE REPLACEMENT (Left) 2 Days Post-Op  Precautions: Fall, WBAT  Chart, physical therapy assessment, plan of care and goals were reviewed. ASSESSMENT  Patient continues with skilled PT services and is progressing towards goals. Patient is received supine in bed and requesting to use the restroom. She is SBA for supine to sit with use of the bed rail and increased time. Patient performs sit<>stand with Min A provided VC for \"nose over toes\" and extending the left LE in front of her in order to sit with decreased pain. Patient demonstrates no buckling or LOB with gait and transfers this session.       Current Level of Function Impacting Discharge (mobility/balance): CGA- Min A for gait and transfers with rolling walker     Other factors to consider for discharge: near falls and imbalance during admission         PLAN :  Patient continues to benefit from skilled intervention to address the above impairments. Continue treatment per established plan of care. to address goals. Recommendation for discharge: (in order for the patient to meet his/her long term goals)  To be determined:  v. SNF     This discharge recommendation:  Has not yet been discussed the attending provider and/or case management    Equipment recommendations for successful discharge (if) home: none       SUBJECTIVE:   Patient stated Joycelyn Fernández you so much.     OBJECTIVE DATA SUMMARY:   Critical Behavior:  Neurologic State: Alert, Appropriate for age  Orientation Level: Appropriate for age, Oriented X4  Cognition: Appropriate decision making, Appropriate for age attention/concentration, Appropriate safety awareness, Follows commands     Functional Mobility Training:  Bed Mobility:                    Transfers:  Sit to Stand: Minimum assistance  Stand to Sit: Minimum assistance                             Balance:  Sitting: Intact  Standing: Impaired  Standing - Static: Constant support;Good  Standing - Dynamic : Constant support; Fair  Ambulation/Gait Training:  Distance (ft): 60 Feet (ft)  Assistive Device: Gait belt;Walker, rolling  Ambulation - Level of Assistance: Contact guard assistance;Assist x2        Gait Abnormalities: Antalgic;Decreased step clearance; Step to gait     Left Side Weight Bearing: As tolerated  Base of Support: Widened  Stance: Left decreased  Speed/Lesa: Pace decreased (<100 feet/min); Slow  Step Length: Right shortened;Left shortened                    Stairs: Therapeutic Exercises:     Pain Rating:      Activity Tolerance:   Fair  Please refer to the flowsheet for vital signs taken during this treatment.     After treatment patient left in no apparent distress:   Supine in bed, Call bell within reach and Side rails x 3    COMMUNICATION/COLLABORATION:   The patients plan of care was discussed with: Registered Nurse    Renetta Myers, PT   Time Calculation: 25 mins

## 2019-10-18 NOTE — PROGRESS NOTES
Bedside and Verbal shift change report given to Sharona Sloan RN (oncoming nurse) by Aaron Lopez RN (offgoing nurse). Report included the following information SBAR, Kardex, Intake/Output, MAR and Recent Results.

## 2019-10-18 NOTE — PROGRESS NOTES
Ortho Daily Progress Note    10/18/2019  11:42 AM    POD:  2 Days Post-Op  S/P:  Procedure(s):  LEFT TOTAL KNEE REPLACEMENT    Afebrile/VSS, NAD, A&O x 3  Had a ground level slip/fall this morning- described as easing to the floor and on to her buttock, losing balance leaning backwards. Was able to get up with assistance and ambulate with walker. Pt denies any increased pain, and did not strike her head. Mentation appears sound  Doing well without complaints of nausea  Pain well controlled  Calves soft/NTTP Bilaterally  Incision OK, no drainage or dehiscence. Dressing clean and dry  Moving lower extremities well. Neurocirculatory exam intact and within normal range. Lab Results   Component Value Date/Time    HGB 10.6 (L) 10/17/2019 04:01 AM    INR 1.1 10/10/2019 08:40 AM     Recent Labs     10/17/19  0401 10/10/19  0840   CREA 0.67 0.74   BUN 19 18     Estimated Creatinine Clearance: 74.2 mL/min (by C-G formula based on SCr of 0.67 mg/dL).     PLAN: Continued close observation, but no additional studies indicated at this time  DVT prophylaxis: Xarelto 10 mg day  WBAT with PT-mobilization  Pain Control- celebrex, tylenol, limit oxycodone to 5 mg   Plan to D/C should stay in house perhaps over the weekend to ensure stability and ability to be more independent and lower fall risk      Levonia Half, [address]

## 2019-10-18 NOTE — PROGRESS NOTES
Post Fall Documentation    Gonzalez Rice witnessed fall occurred on 10/18/19 (Date) at 1 (Time). The answers to the following questions summarize the fall: In the patient's own words:  · What were you attempting to do when you fell? \"I was getting up from the bedside commode\"  · Do you know why you fell? \"I lost my balance and fell backward, but the tech caught me and helped me to the ground. \"   · Do you have any pain/discomfort or any other complaints? No, I just slid down onto my butt, I didn't hit anything. · Which part of your body made contact with the floor or other object? \"my butt\"  Nurse:  [facility] Was this an assisted fall? yes   Was fall witnessed? Yes     By Whom? [place], [place]   If witnessed, what part of the body made contact with the floor or other object? Her buttocks   Patients mental status after the fall/when found: Alert and oriented   Any apparent injury:  No apparent injury   Immediate interventions for injury/suspected injury? No interventions needed   Patient assisted back to bed? Assist X2   Name of [facility] notified and time, any comments[facility]32        Name of family member notified and time: Patient does not want us to notify anyone    Document Immediate VS and physical assessment in flowsheets. Document Neuro assessment every hour x 4 (for potential head injury or unwitnessed fall) in flowsheets. [facility] Vizcaino notified of assisted fall. No new orders received, MD and PA will assess patient later in the day. RN will continue to assess and will notify PA of any changes.      Viviana Cervantes RN

## 2019-10-18 NOTE — PROGRESS NOTES
Problem: Mobility Impaired (Adult and Pediatric)  Goal: *Acute Goals and Plan of Care (Insert Text)  Description  FUNCTIONAL STATUS PRIOR TO ADMISSION: Patient was independent and active without use of DME.    HOME SUPPORT PRIOR TO ADMISSION: The patient lived alone with no local support. Physical Therapy Goals  Initiated 10/17/2019    1. Patient will move from supine to sit and sit to supine  in bed with modified independence within 4 days. 2. Patient will perform sit to stand with modified independence within 4 days. 3. Patient will ambulate with modified independence for 200 feet with the least restrictive device within 4 days. 4. Patient will ascend/descend 1 stairs with 2 handrail(s) with modified independence within 4 days. 5. Patient will perform home exercise program per protocol with modified independence within 4 days. 6. Patient will demonstrate AROM 0-90 degrees in operative joint within 4 days. Outcome: Progressing Towards Goal   PHYSICAL THERAPY TREATMENT  Patient: Blair Alvarenga (37 y.o. female)  Date: 10/18/2019  Diagnosis: Primary localized osteoarthritis of left knee [M17.12]  Primary localized osteoarthritis of left knee [M17.12] Primary localized osteoarthritis of left knee  Procedure(s) (LRB):  LEFT TOTAL KNEE REPLACEMENT (Left) 2 Days Post-Op  Precautions: Fall, WBAT  Chart, physical therapy assessment, plan of care and goals were reviewed. ASSESSMENT  Patient continues with skilled PT services and is slowly progressing towards goals. Of note patient has had to falls since admission during which she was safely lowered to the floor. Per documentation patient is losing her balance backwards. She is received seated in bedside chair. Patient requires verbal cues for \"nose over toes\" for improved sit to stand transfer as well as pulling the L LE back and behind her for improved balance during transfer.  Patient ambulates with decreased gait speed and bilateral step length using step to gait pattern. Patient demonstrates no LOB or buckling during gait this session. VC are provoided for increased L LE step length and patient is able to perform for about 15 ft before decreasing step length. She is left seated in bedside chair with all needs met. Current Level of Function Impacting Discharge (mobility/balance): CGA gait and tranfers with rolling walker, Min A sit<>stand    Other factors to consider for discharge: stair training          PLAN :  Patient continues to benefit from skilled intervention to address the above impairments. Continue treatment per established plan of care. to address goals. Recommendation for discharge: (in order for the patient to meet his/her long term goals)  To be determined: anticipate home with home health therapy     This discharge recommendation:  Has been made in collaboration with the attending provider and/or case management    Equipment recommendations for successful discharge (if) home: patient owns DME required for discharge       SUBJECTIVE:   Patient stated it's good to have a man here.     OBJECTIVE DATA SUMMARY:   Critical Behavior:  Neurologic State: Alert, Appropriate for age  Orientation Level: Appropriate for age, Oriented X4  Cognition: Appropriate decision making, Appropriate for age attention/concentration, Appropriate safety awareness, Follows commands     Functional Mobility Training:  Bed Mobility:                    Transfers:  Sit to Stand: Minimum assistance  Stand to Sit: Minimum assistance                             Balance:  Sitting: Intact  Standing: Impaired  Standing - Static: Constant support;Good  Standing - Dynamic : Constant support; Fair  Ambulation/Gait Training:  Distance (ft): 80 Feet (ft)  Assistive Device: Gait belt;Walker, rolling  Ambulation - Level of Assistance: Contact guard assistance;Assist x2        Gait Abnormalities: Antalgic;Decreased step clearance; Step to gait     Left Side Weight Bearing: As tolerated  Base of Support: Widened  Stance: Left decreased  Speed/Lesa: Pace decreased (<100 feet/min); Slow  Step Length: Left shortened;Right shortened                    Stairs: Therapeutic Exercises:     Pain Rating:      Activity Tolerance:   Fair  Please refer to the flowsheet for vital signs taken during this treatment.     After treatment patient left in no apparent distress:   Sitting in chair and Call bell within reach    COMMUNICATION/COLLABORATION:   The patients plan of care was discussed with: Registered Nurse and Rehabilitation Attendant    Aneudy Strange, PT   Time Calculation: 27 mins

## 2019-10-18 NOTE — PROGRESS NOTES
TOTAL KNEE PROGRESS NOTE      Subjective:     Post-Operative Day: 2 Status Post left Total Knee Arthroplasty  Systemic or Specific Complaints:No Complaints    Objective:     Patient Vitals for the past 24 hrs:   BP Temp Pulse Resp SpO2   10/18/19 0221 126/71 98.6 °F (37 °C) 61 16 95 %   10/17/19 2100 103/47 98.3 °F (36.8 °C) 62 16 91 %   10/17/19 1342 130/66 97.4 °F (36.3 °C) 67 16 94 %   10/17/19 0839 101/61 99.6 °F (37.6 °C) 61 16 93 %       General: alert, cooperative, no distress, appears stated age   Wound: Wound clean and dry no evidence of infection. , No Erythema, No Edema, No Drainage and Wound Intact   Motion: Extension: Full Extension   DVT Exam: No evidence of DVT seen on physical exam.  Negative Bradley's sign. No cords or calf tenderness. No significant calf/ankle edema. Data Review:    Recent Results (from the past 24 hour(s))   GLUCOSE, POC    Collection Time: 10/17/19 11:21 AM   Result Value Ref Range    Glucose (POC) 166 (H) 65 - 100 mg/dL    Performed by Brady Pinedo    GLUCOSE, POC    Collection Time: 10/17/19  4:32 PM   Result Value Ref Range    Glucose (POC) 142 (H) 65 - 100 mg/dL    Performed by Leonel Almanza, POC    Collection Time: 10/17/19  9:11 PM   Result Value Ref Range    Glucose (POC) 157 (H) 65 - 100 mg/dL    Performed by Petrona Valentine PCT    GLUCOSE, POC    Collection Time: 10/18/19  6:21 AM   Result Value Ref Range    Glucose (POC) 139 (H) 65 - 100 mg/dL    Performed by Suslazaro Loffler PCT          Assessment:     Status Post left Total Knee Arthroplasty. Doing well postoperatively. . Bandage aquacel, clean/dry. Labs stable. PT progressing, \"I feel like I did too much\". Pain control WNL.  Planned for stairs with PT this AM.    Plan:     Continues current post-op course  Continue PT per protocol  Plan to discharge to [facility]. today if cleared by PT.

## 2019-10-18 NOTE — PROGRESS NOTES
Bedside and Verbal shift change report given to Flower Wu RN (oncoming nurse) by Kirsty Park RN (offgoing nurse). Report included the following information SBAR and MAR.

## 2019-10-18 NOTE — PROGRESS NOTES
Physical Therapy Note    Patient currently getting washed up. Will come back for PT tx and clearance on stairs.     Thank you,  Haresh ISBELLT

## 2019-10-19 LAB
GLUCOSE BLD STRIP.AUTO-MCNC: 109 MG/DL (ref 65–100)
GLUCOSE BLD STRIP.AUTO-MCNC: 122 MG/DL (ref 65–100)
GLUCOSE BLD STRIP.AUTO-MCNC: 123 MG/DL (ref 65–100)
GLUCOSE BLD STRIP.AUTO-MCNC: 137 MG/DL (ref 65–100)
SERVICE CMNT-IMP: ABNORMAL

## 2019-10-19 PROCEDURE — 99218 HC RM OBSERVATION: CPT

## 2019-10-19 PROCEDURE — 74011250637 HC RX REV CODE- 250/637: Performed by: PHYSICIAN ASSISTANT

## 2019-10-19 PROCEDURE — 82962 GLUCOSE BLOOD TEST: CPT

## 2019-10-19 PROCEDURE — 74011250637 HC RX REV CODE- 250/637: Performed by: ORTHOPAEDIC SURGERY

## 2019-10-19 PROCEDURE — 97165 OT EVAL LOW COMPLEX 30 MIN: CPT

## 2019-10-19 PROCEDURE — 97535 SELF CARE MNGMENT TRAINING: CPT

## 2019-10-19 PROCEDURE — 97116 GAIT TRAINING THERAPY: CPT

## 2019-10-19 RX ADMIN — CLONAZEPAM 1 MG: 1 TABLET ORAL at 22:27

## 2019-10-19 RX ADMIN — POLYETHYLENE GLYCOL 3350 17 G: 17 POWDER, FOR SOLUTION ORAL at 09:47

## 2019-10-19 RX ADMIN — SENNOSIDES, DOCUSATE SODIUM 1 TABLET: 50; 8.6 TABLET, FILM COATED ORAL at 17:51

## 2019-10-19 RX ADMIN — ATORVASTATIN CALCIUM 10 MG: 10 TABLET, FILM COATED ORAL at 22:27

## 2019-10-19 RX ADMIN — ESCITALOPRAM OXALATE 20 MG: 10 TABLET ORAL at 09:46

## 2019-10-19 RX ADMIN — Medication 5 ML: at 14:00

## 2019-10-19 RX ADMIN — ACETAMINOPHEN 650 MG: 325 TABLET, FILM COATED ORAL at 20:28

## 2019-10-19 RX ADMIN — MONTELUKAST 10 MG: 10 TABLET, FILM COATED ORAL at 22:27

## 2019-10-19 RX ADMIN — SENNOSIDES, DOCUSATE SODIUM 1 TABLET: 50; 8.6 TABLET, FILM COATED ORAL at 09:46

## 2019-10-19 RX ADMIN — ACETAMINOPHEN 650 MG: 325 TABLET, FILM COATED ORAL at 12:37

## 2019-10-19 RX ADMIN — HYDROCHLOROTHIAZIDE 12.5 MG: 25 TABLET ORAL at 17:51

## 2019-10-19 RX ADMIN — OXYCODONE HYDROCHLORIDE 5 MG: 5 TABLET ORAL at 01:43

## 2019-10-19 RX ADMIN — CELECOXIB 200 MG: 200 CAPSULE ORAL at 09:46

## 2019-10-19 RX ADMIN — ACETAMINOPHEN 650 MG: 325 TABLET, FILM COATED ORAL at 00:30

## 2019-10-19 RX ADMIN — LISINOPRIL 40 MG: 10 TABLET ORAL at 17:51

## 2019-10-19 RX ADMIN — PANTOPRAZOLE SODIUM 40 MG: 40 TABLET, DELAYED RELEASE ORAL at 20:28

## 2019-10-19 RX ADMIN — RIVAROXABAN 10 MG: 10 TABLET, FILM COATED ORAL at 12:37

## 2019-10-19 RX ADMIN — CELECOXIB 200 MG: 200 CAPSULE ORAL at 20:28

## 2019-10-19 RX ADMIN — TRAZODONE HYDROCHLORIDE 50 MG: 100 TABLET ORAL at 22:27

## 2019-10-19 RX ADMIN — OXYCODONE HYDROCHLORIDE 5 MG: 5 TABLET ORAL at 01:13

## 2019-10-19 RX ADMIN — LEVOTHYROXINE SODIUM 112 MCG: 112 TABLET ORAL at 07:30

## 2019-10-19 RX ADMIN — ACETAMINOPHEN 650 MG: 325 TABLET, FILM COATED ORAL at 07:30

## 2019-10-19 RX ADMIN — METFORMIN HYDROCHLORIDE 500 MG: 500 TABLET, EXTENDED RELEASE ORAL at 17:51

## 2019-10-19 NOTE — PROGRESS NOTES
Orthopedic Spine Progress Note  Post Op day: 3 Days Post-Op    2019 10:16 AM     Aidee Wilkinson    Vital Signs:    Patient Vitals for the past 8 hrs:   BP Temp Pulse Resp SpO2   10/19/19 0819 146/74 98.1 °F (36.7 °C) (!) 56 18 98 %   10/19/19 0245 116/61 98.1 °F (36.7 °C) 72 16 96 %     Temp (24hrs), Av.2 °F (36.8 °C), Min:97.8 °F (36.6 °C), Max:98.6 °F (37 °C)      Intake/Output:  No intake/output data recorded. 10/17 1901 - 10/19 0700  In: -   Out: 450 [Urine:450]    Pain Control:   Pain Assessment  Pain Scale 1: Numeric (0 - 10)  Pain Intensity 1: 5  Pain Onset 1: surgery  Pain Location 1: Knee  Pain Orientation 1: Left  Pain Description 1: Aching  Pain Intervention(s) 1: Medication (see MAR)    LAB:    Recent Labs     10/17/19  0401   HGB 10.6*     Lab Results   Component Value Date/Time    Sodium 136 10/17/2019 04:01 AM    Potassium 4.0 10/17/2019 04:01 AM    Chloride 105 10/17/2019 04:01 AM    CO2 27 10/17/2019 04:01 AM    Glucose 161 (H) 10/17/2019 04:01 AM    BUN 19 10/17/2019 04:01 AM    Creatinine 0.67 10/17/2019 04:01 AM    Calcium 8.2 (L) 10/17/2019 04:01 AM       Subjective:  Aidee Wilkinson is a 75 y.o. female s/p a  Procedure(s):  LEFT TOTAL KNEE REPLACEMENT   Procedure(s):  LEFT TOTAL KNEE REPLACEMENT. Tolerating diet. Objective: General: alert, cooperative, no distress. Icing knee   Gastrointestinal:  Soft, non-tender. Neurological: Neurovascular exam within normal limits. Sensation stable. Motor: unchanged C5-T1 and L2-S1. Skin: Incision - clean, dry and intact. No significant erythema or swelling.     Dressing: clean, dry, and intact     PT/OT:   Gait:  Gait  Base of Support: Widened  Speed/Lesa: Pace decreased (<100 feet/min), Slow  Step Length: Right shortened, Left shortened  Stance: Left decreased  Gait Abnormalities: Antalgic, Decreased step clearance, Step to gait  Ambulation - Level of Assistance: Contact guard assistance, Assist x2  Distance (ft): 60 Feet (ft)  Assistive Device: Gait belt, Walker, rolling                   Assessment:    s/p Procedure(s):  LEFT TOTAL KNEE REPLACEMENT    Principal Problem:    Primary localized osteoarthritis of left knee (10/11/2019)         Plan:     1. Continue PT/OT  2. Continue established methods of pain control  3. VTE Prophylaxes - TEDS &/or SCDs              Discharge To:   Home with home health tomorrow    Signed By: Bernard Pradhan MD

## 2019-10-19 NOTE — PROGRESS NOTES
Problem: Mobility Impaired (Adult and Pediatric)  Goal: *Acute Goals and Plan of Care (Insert Text)  Description  FUNCTIONAL STATUS PRIOR TO ADMISSION: Patient was independent and active without use of DME.    HOME SUPPORT PRIOR TO ADMISSION: The patient lived alone with no local support. Physical Therapy Goals  Initiated 10/17/2019    1. Patient will move from supine to sit and sit to supine  in bed with modified independence within 4 days. 2. Patient will perform sit to stand with modified independence within 4 days. 3. Patient will ambulate with modified independence for 200 feet with the least restrictive device within 4 days. 4. Patient will ascend/descend 1 stairs with 2 handrail(s) with modified independence within 4 days. 5. Patient will perform home exercise program per protocol with modified independence within 4 days. 6. Patient will demonstrate AROM 0-90 degrees in operative joint within 4 days. Outcome: Progressing Towards Goal   PHYSICAL THERAPY TREATMENT  Patient: Smitha Vargas (2 y.o. female)  Date: 10/19/2019  Diagnosis: Primary localized osteoarthritis of left knee [M17.12]  Primary localized osteoarthritis of left knee [M17.12] Primary localized osteoarthritis of left knee  Procedure(s) (LRB):  LEFT TOTAL KNEE REPLACEMENT (Left) 3 Days Post-Op  Precautions: Fall, WBAT  Chart, physical therapy assessment, plan of care and goals were reviewed. ASSESSMENT  Patient continues with skilled PT services and is progressing towards goals. Patient is received seated EOB. She demonstrates the need for CGA for gait and transfers with a rolling walker in order to get in to the restroom. Patient transfers to wheelchair for stair training. She demonstrates the ability to ascend and descend 4 stairs with left railing and single point cane. Patient ambulates increased distance back to her room. She ambulates with decreased bilateral step length and decreased pace.  Patient is provided verbal cues for step to through gait pattern and is able to maintain for about 10 ft with decreased step length. Patient is cleared by PT at this time. Current Level of Function Impacting Discharge (mobility/balance): CGA with gait and transfers using a rolling walker     Other factors to consider for discharge: Secondary to two falls: one Thursday evening and one Friday morning patient may need rehab. She demonstrates significant anxiety about going home. PLAN :  Patient continues to benefit from skilled intervention to address the above impairments. Continue treatment per established plan of care. to address goals. Recommendation for discharge: (in order for the patient to meet his/her long term goals)  To be determined: HH v. SNF     This discharge recommendation:  Has been made in collaboration with the attending provider and/or case management    Equipment recommendations for successful discharge (if) home: patient owns DME required for discharge       SUBJECTIVE:   Patient stated i'm shaking im just starting to get nervous about going home .     OBJECTIVE DATA SUMMARY:   Critical Behavior:  Neurologic State: Alert  Orientation Level: Oriented X4  Cognition: Appropriate decision making, Appropriate for age attention/concentration, Appropriate safety awareness, Follows commands     Functional Mobility Training:  Bed Mobility:                    Transfers:  Sit to Stand: Contact guard assistance  Stand to Sit: Contact guard assistance                             Balance:  Sitting: Intact  Standing: Impaired  Standing - Static: Constant support;Good  Standing - Dynamic : Constant support; Fair  Ambulation/Gait Training:  Distance (ft): 125 Feet (ft)  Assistive Device: Gait belt;Walker, rolling  Ambulation - Level of Assistance: Contact guard assistance;Assist x2        Gait Abnormalities: Antalgic;Decreased step clearance; Step to gait        Base of Support: Widened  Stance: Left decreased  Speed/Lesa: Pace decreased (<100 feet/min); Slow  Step Length: Left shortened;Right shortened                    Stairs:  Number of Stairs Trained: 4  Stairs - Level of Assistance: Contact guard assistance   Rail Use: Left     Therapeutic Exercises:     Pain Rating:      Activity Tolerance:   Good  Please refer to the flowsheet for vital signs taken during this treatment.     After treatment patient left in no apparent distress:   Sitting in chair and Call bell within reach    COMMUNICATION/COLLABORATION:   The patients plan of care was discussed with: Occupational Therapist and Registered Nurse    Renetta Myers, PT   Time Calculation: 42 mins

## 2019-10-19 NOTE — PROGRESS NOTES
Physical Therapy Note    Patient ambulates for second PT session with rehab technician rolling walker and gait belt with CGA.      Thank you,  Renetta ISBELLT

## 2019-10-19 NOTE — PROGRESS NOTES
Problem: Self Care Deficits Care Plan (Adult)  Goal: *Therapy Goal (Edit Goal, Insert Text)  Description  FUNCTIONAL STATUS PRIOR TO ADMISSION: Patient was independent and active without use of DME.    HOME SUPPORT: The patient lived with son but did not require assist. Son works full time. Occupational Therapy Goals  Initiated 10/19/2019  1. Patient will perform lower body ADLs with supervision/set-up within 4 day(s). 2.  Patient will perform upper body ADLs standing 5 mins without fatigue or LOB with supervision/set-up within 4 day(s). 3.  Patient will perform all aspects of toileting with supervision/set-up within 4 day(s). 4.  Patient will participate in upper extremity therapeutic exercise/activities with supervision/set-up for 10 minutes within 4 day(s). 5.  Patient will utilize energy conservation techniques during functional activities without cues within 4 day(s). Outcome: Progressing Towards Goal     OCCUPATIONAL THERAPY EVALUATION  Patient: [facility] (77 y.o. female)  Date: 10/19/2019  Primary Diagnosis: Primary localized osteoarthritis of left knee [M17.12]  Primary localized osteoarthritis of left knee [M17.12]  Procedure(s) (LRB):  LEFT TOTAL KNEE REPLACEMENT (Left) 3 Days Post-Op   Precautions: Fall, WBAT    ASSESSMENT  Based on the objective data described below, the patient presents with decreased mobility and ADL, pain, and generally anxiety regarding discharge home. Reviewed ADL education and assisted to toilet. She will likely need no LB AE as she is able to touch toe on L side with minor struggle. Bedroom and full bathroom is on the second floor. Has had two near falls during stay but with therapy has been SBA to CGA. Progressing with each therapy visit. Will go home with [facility] versus rehab if physician deems this is best course of action as she will be alone for 12 hours during the day.     Current Level of Function Impacting Discharge (ADLs/self-care): up to minimal assistance    Functional Outcome Measure: The patient scored 70/100 on the Barthel Index outcome measure which is indicative of 30% ADL and mobility deficit. Other factors to consider for discharge: none     Patient will benefit from skilled therapy intervention to address the above noted impairments. PLAN :  Recommendations and Planned Interventions: self care training, functional mobility training, therapeutic exercise, balance training, therapeutic activities, patient education, home safety training and family training/education    Frequency/Duration: Patient will be followed by occupational therapy 5 times a week to address goals. Recommendation for discharge: (in order for the patient to meet his/her long term goals)  To be determined: home with [facility] versus short skilled stay per physician preference     This discharge recommendation:  A follow-up discussion with the attending provider and/or case management is planned    Equipment recommendations for successful discharge (if) home: none       SUBJECTIVE:   Patient stated i'd rather use bath wiped than get into bathtub.     OBJECTIVE DATA SUMMARY:   HISTORY:   Past Medical History:   Diagnosis Date    Anxiety state, unspecified     Arthritis     knes and back , foot osteoarthritis    Asthma     Diabetes ([facility] [address]     Hypertension     Hypothyroidism     Menopause     LMP-45years old?     Morbid obesity ([facility] [address]     Psychiatric disorder     ANXIETY, DEPRESSION    Seizures ([facility] [address]     had a seizure as a result of medication about 50 years ago    Vitamin D deficiency 5/11/2010     Past Surgical History:   Procedure Laterality Date    BREAST SURGERY PROCEDURE UNLISTED      implants for deformity, and removed    ENDOSCOPY, COLON, DIAGNOSTIC  9/13/2010    -due again 9/2015    HX CHOLECYSTECTOMY      HX GI  06/2019    COLONOSCOPY    HX HERNIA REPAIR      x 2    HX TONSILLECTOMY      IMPLANT BREAST SILICONE/EQ Bilateral 5647    Lt implant was removed due to leakage. Expanded or extensive additional review of patient history:     Home Situation  Home Environment: Private residence  # Steps to Enter: 1  One/Two Story Residence: Two story, live on 1st floor(temp bedroom setup downstairs)  Living Alone: No  Support Systems: Child(adama), Friends \ neighbors  Patient Expects to be Discharged to[de-identified] Private residence  Current DME Used/Available at Home: Walker, rolling    Hand dominance: Right    EXAMINATION OF PERFORMANCE DEFICITS:  Cognitive/Behavioral Status:                      Skin: surgical incision    Edema: none    Hearing: Auditory  Auditory Impairment: None    Vision/Perceptual:                           Acuity: Within Defined Limits         Range of Motion:    AROM: Generally decreased, functional                         Strength:    Strength: Generally decreased, functional                Coordination:     Fine Motor Skills-Upper: Left Intact; Right Intact    Gross Motor Skills-Upper: Left Intact; Right Intact    Tone & Sensation:  Inatct and normal                            Balance:  Sitting: Intact  Standing: Impaired  Standing - Static: Constant support;Good  Standing - Dynamic : Constant support; Fair    Functional Mobility and Transfers for ADLs:  Bed Mobility:       Transfers:  Sit to Stand: Contact guard assistance  Stand to Sit: Contact guard assistance  Bathroom Mobility: Contact guard assistance  Toilet Transfer : Contact guard assistance    ADL Assessment:  Feeding: Independent    Oral Facial Hygiene/Grooming: Independent    Bathing: Contact guard assistance    Upper Body Dressing: Independent    Lower Body Dressing: Minimum assistance(for L sock)    Toileting: Contact guard assistance                ADL Intervention and task modifications:                                 Toileting  Toileting Assistance: Contact guard assistance         Bathing: Patient instructed and indicated understanding when bathing to not submerge wound in water, stand to shower or sponge bathe, cover wound with plastic and tape to ensure no water reaches bandage/wound without cues. Dressing joint: Patient instructed and demonstrated understanding to don/doff Left LE first/last with Contact guard assistance and Minimum assistance. Patient instructed and demonstrated to don all clothing while sitting prior to standing, doff all clothing to knees while standing, then sit to doff clothing off from knees to feet in order to facilitate fall prevention, pain management, and energy conservation. Home safety: Patient instructed and indicated understanding on home modifications and safety (raise height of ADL objects, appropriate height of chair surfaces, recliner safety, change of floor surfaces, clear pathways) to increase independence and fall prevention. Standing: Patient instructed and demonstrated during ADLs to walk up to sink/counter top/surfaces, step into walker to increase safety of joint and fall prevention with Contact guard assistance. Patient educated about knee anatomy and educated to avoid rotation of Left LE. Instructed to apply concept to ADLs within the home (no twisting of knee during reaching across body, square off while using objects, slide objects along surfaces). Patient instructed and indicated understanding to increase amount of time standing, observe standing position during ADLs in order to increase even weight bearing through bilateral LEs in order to increase independence with ADLs. Goal to be reached 30 days post - op, per orthopedic surgeon or per PT. Tub/shower transfer: Patient instructed and indicated understanding regarding when it is safe to begin transfer into tub/shower (complete stairs with PT, advance exercises with PT high enough to clear tub/shower height). Patient instructed to use the same technique as used with stairs when entering and exiting tub/shower (\"up with the non-surgical, down with the surgical leg\").      Functional Measure:  Barthel Index:    Bathin  Bladder: 10  Bowels: 10  Groomin  Dressin  Feeding: 10  Mobility: 10  Stairs: 5  Toilet Use: 5  Transfer (Bed to Chair and Back): 10  Total: 70/100        The Barthel ADL Index: Guidelines  1. The index should be used as a record of what a patient does, not as a record of what a patient could do. 2. The main aim is to establish degree of independence from any help, physical or verbal, however minor and for whatever reason. 3. The need for supervision renders the patient not independent. 4. A patient's performance should be established using the best available evidence. Asking the patient, friends/relatives and nurses are the usual sources, but direct observation and common sense are also important. However direct testing is not needed. 5. Usually the patient's performance over the preceding 24-48 hours is important, but occasionally longer periods will be relevant. 6. Middle categories imply that the patient supplies over 50 per cent of the effort. 7. Use of aids to be independent is allowed. Mennie Barthel., Barthel, D.W. ([id number]). Functional evaluation: the Barthel Index. [facility] (14)2. MASTER Lopez, Melvi Murphy, Nan Jewell., [place], [address] Measuring the change indisability after inpatient rehabilitation; comparison of the responsiveness of the Barthel Index and Functional [place] Measure. Journal of Neurology, Neurosurgery, and Psychiatry, 66(4), 550-519. Marie Olivo, N.J.A, KAYLAN NavarroJ.SOPHY, & Vania Whitaker, M.A. (2004.) Assessment of post-stroke quality of life in cost-effectiveness studies: The usefulness of the Barthel Index and the EuroQoL-5D.  Quality of Life Research, 15, 568-57        Occupational Therapy Evaluation Charge Determination   History Examination Decision-Making   LOW Complexity : Brief history review  LOW Complexity : 1-3 performance deficits relating to physical, cognitive , or psychosocial skils that result in activity limitations and / or participation restrictions  LOW Complexity : No comorbidities that affect functional and no verbal or physical assistance needed to complete eval tasks       Based on the above components, the patient evaluation is determined to be of the following complexity level: LOW   Pain Rating:  Reports pain at rest and increased with movement    Activity Tolerance:   Good  Please refer to the flowsheet for vital signs taken during this treatment. After treatment patient left in no apparent distress:    Supine in bed, Bed / chair alarm activated and Caregiver / family present    COMMUNICATION/EDUCATION:   The patients plan of care was discussed with: Physical Therapist.    Home safety education was provided and the patient/caregiver indicated understanding., Patient/family have participated as able in goal setting and plan of care. and Patient/family agree to work toward stated goals and plan of care. This patients plan of care is appropriate for delegation to [facility].     Thank you for this referral.  Bindu Inman  Time Calculation: 26 mins

## 2019-10-19 NOTE — PROGRESS NOTES
Bedside and Verbal shift change report given to Anita Walton RN (oncoming nurse) by Gloria Vicente (offgoing nurse). Report included the following information SBAR, Kardex, Intake/Output, MAR and Recent Results.

## 2019-10-19 NOTE — PROGRESS NOTES
Bedside and Verbal shift change report given to [address] [facility] David (oncoming nurse) by Maggie Saunders (offgoing nurse). Report included the following information SBAR.

## 2019-10-20 VITALS
RESPIRATION RATE: 16 BRPM | SYSTOLIC BLOOD PRESSURE: 148 MMHG | OXYGEN SATURATION: 96 % | BODY MASS INDEX: 32.84 KG/M2 | TEMPERATURE: 98 F | DIASTOLIC BLOOD PRESSURE: 76 MMHG | HEIGHT: 64 IN | WEIGHT: 192.38 LBS | HEART RATE: 62 BPM

## 2019-10-20 LAB
GLUCOSE BLD STRIP.AUTO-MCNC: 118 MG/DL (ref 65–100)
SERVICE CMNT-IMP: ABNORMAL

## 2019-10-20 PROCEDURE — 99218 HC RM OBSERVATION: CPT

## 2019-10-20 PROCEDURE — 82962 GLUCOSE BLOOD TEST: CPT

## 2019-10-20 PROCEDURE — 74011250637 HC RX REV CODE- 250/637: Performed by: PHYSICIAN ASSISTANT

## 2019-10-20 PROCEDURE — 74011250637 HC RX REV CODE- 250/637: Performed by: ORTHOPAEDIC SURGERY

## 2019-10-20 RX ADMIN — RIVAROXABAN 10 MG: 10 TABLET, FILM COATED ORAL at 11:07

## 2019-10-20 RX ADMIN — CELECOXIB 200 MG: 200 CAPSULE ORAL at 08:22

## 2019-10-20 RX ADMIN — POLYETHYLENE GLYCOL 3350 17 G: 17 POWDER, FOR SOLUTION ORAL at 08:22

## 2019-10-20 RX ADMIN — ACETAMINOPHEN 650 MG: 325 TABLET, FILM COATED ORAL at 07:22

## 2019-10-20 RX ADMIN — SENNOSIDES, DOCUSATE SODIUM 1 TABLET: 50; 8.6 TABLET, FILM COATED ORAL at 08:22

## 2019-10-20 RX ADMIN — OXYCODONE HYDROCHLORIDE 5 MG: 5 TABLET ORAL at 11:07

## 2019-10-20 RX ADMIN — ESCITALOPRAM OXALATE 20 MG: 10 TABLET ORAL at 08:22

## 2019-10-20 RX ADMIN — LEVOTHYROXINE SODIUM 112 MCG: 112 TABLET ORAL at 07:22

## 2019-10-20 NOTE — PROGRESS NOTES
Hiram Castellanos and I reviewed discharge instructions with the patient and son. The patient and son verbalized understanding. Patient has paper prescription and all personal belongings (cell phone, wallet, clothes, and phone). Patient has standard discharge instructions. Per [facility] PA, he will send her the full discharge instructions tomorrow, 10/20/2019.

## 2019-10-20 NOTE — PROGRESS NOTES
Bedside and Verbal shift change report given to Tommie Yo RN (oncoming nurse) by Karen hWitehead RN (offgoing nurse). Report included the following information SBAR, Kardex, Intake/Output, MAR and Recent Results.

## 2019-10-20 NOTE — PROGRESS NOTES
Bedside and Verbal shift change report given to Aixa Fuentes RN (oncoming nurse) by Queenie Amador RN (offgoing nurse). Report included the following information SBAR, Kardex, Intake/Output, MAR and Accordion.

## 2019-10-20 NOTE — PROGRESS NOTES
Orthopedic Spine Progress Note  Post Op day: 4 Days Post-Op    2019 9:48 AM     Kyler Burkett    Vital Signs:    Patient Vitals for the past 8 hrs:   BP Temp Pulse Resp SpO2   10/20/19 0758 148/76 98 °F (36.7 °C) 62 16 96 %   10/20/19 0427 152/73 97.7 °F (36.5 °C) 73 16 98 %     Temp (24hrs), Av.1 °F (36.7 °C), Min:97.7 °F (36.5 °C), Max:98.6 °F (37 °C)      Intake/Output:  No intake/output data recorded. 10/18 1901 - 10/20 0700  In: -   Out: 450 [Urine:450]    Pain Control:   Pain Assessment  Pain Scale 1: Numeric (0 - 10)  Pain Intensity 1: 0  Pain Onset 1: surgery  Pain Location 1: Knee  Pain Orientation 1: Left  Pain Description 1: Aching  Pain Intervention(s) 1: Rest, Repositioned, Refused    LAB:    No results for input(s): HCT, HGB, HCTEXT, HGBEXT in the last 72 hours. Lab Results   Component Value Date/Time    Sodium 136 10/17/2019 04:01 AM    Potassium 4.0 10/17/2019 04:01 AM    Chloride 105 10/17/2019 04:01 AM    CO2 27 10/17/2019 04:01 AM    Glucose 161 (H) 10/17/2019 04:01 AM    BUN 19 10/17/2019 04:01 AM    Creatinine 0.67 10/17/2019 04:01 AM    Calcium 8.2 (L) 10/17/2019 04:01 AM       Subjective:  Kyler Burkett is a 75 y.o. female s/p a  Procedure(s):  LEFT TOTAL KNEE REPLACEMENT   Procedure(s):  LEFT TOTAL KNEE REPLACEMENT. Tolerating diet. Objective: General: alert, cooperative, no distress. Gastrointestinal:  Soft, non-tender. Neurological: Neurovascular exam within normal limits. Sensation stable. Motor: unchanged C5-T1 and L2-S1. Musculoskeletal:  Bradley's sign negative in bilateral lower extremities. Calves soft, supple, non-tender upon palpation or with passive stretch. Skin: Incision - clean, dry and intact. No significant erythema or swelling.     Dressing: clean, dry, and intact, only slight discoloration     PT/OT:   Gait:  Gait  Base of Support: Widened  Speed/Lesa: Pace decreased (<100 feet/min), Slow  Step Length: Left shortened, Right shortened  Stance: Left decreased  Gait Abnormalities: Antalgic, Decreased step clearance, Step to gait  Ambulation - Level of Assistance: Contact guard assistance, Assist x2  Distance (ft): 125 Feet (ft)  Assistive Device: Gait belt, Walker, rolling  Rail Use: Left   Stairs - Level of Assistance: Contact guard assistance  Number of Stairs Trained: 4                   Assessment:    s/p Procedure(s):  LEFT TOTAL KNEE REPLACEMENT    Principal Problem:    Primary localized osteoarthritis of left knee (10/11/2019)         Plan:     1. Continue PT/OT  2. Continue established methods of pain control  3.   VTE Prophylaxes - TEDS &/or SCDs              Discharge To:home      Signed By: Arthur Espinoza MD

## 2019-10-21 ENCOUNTER — HOME CARE VISIT (OUTPATIENT)
Dept: SCHEDULING | Facility: HOME HEALTH | Age: 74
End: 2019-10-21
Payer: MEDICARE

## 2019-10-21 VITALS
SYSTOLIC BLOOD PRESSURE: 148 MMHG | DIASTOLIC BLOOD PRESSURE: 78 MMHG | TEMPERATURE: 98.4 F | HEART RATE: 64 BPM | OXYGEN SATURATION: 96 % | RESPIRATION RATE: 14 BRPM

## 2019-10-21 VITALS
OXYGEN SATURATION: 96 % | DIASTOLIC BLOOD PRESSURE: 78 MMHG | TEMPERATURE: 97.7 F | HEART RATE: 78 BPM | RESPIRATION RATE: 18 BRPM | SYSTOLIC BLOOD PRESSURE: 118 MMHG

## 2019-10-21 PROCEDURE — 3331090001 HH PPS REVENUE CREDIT

## 2019-10-21 PROCEDURE — 3331090002 HH PPS REVENUE DEBIT

## 2019-10-21 PROCEDURE — G0151 HHCP-SERV OF PT,EA 15 MIN: HCPCS

## 2019-10-21 PROCEDURE — G0299 HHS/HOSPICE OF RN EA 15 MIN: HCPCS

## 2019-10-21 PROCEDURE — 400013 HH SOC

## 2019-10-21 NOTE — DISCHARGE INSTRUCTIONS
Take medications as instructed:  Xarelto  Oxycodone  Celebrex    Complete your exercises as instructed 3 times daily; refer to your joint handbook for exercises. Weight Bearing as Tolerated; continue to bear weight on surgical leg. Use walker until follow-up visit or as instructed by Physical Therapist    Pain management:  Ice your joint after exercise and intermittently for pain. Take Tylenol 500-650 mg every 4-6 hours when awake and take Oxycodone 5-10 mg every 4 hours as needed for moderate to severe pain. You have an Aquacel Silver Dressing over your incision. You can shower with the Aquacel bandage in place. Aquacel dressing is to be removed 7 days after the surgical day. When the Aquacel dressing is removed, you may shower. Use the \"ABD\" pads to cover dressing once daily if needed when Aquacel removed. Continue to wear TEDs as instructed. You may take them off one hour daily for bathing. Full discharge instructions to follow tomorrow, 10/21/2019, from Dr. Alfa Gold office. \    [place] Orders All Total Knees    Case Management for DC planning to Home HC .   - PT for 3 times a week for 1st week (4 PT visits to be scheduled), patient will transition to outpatient PT in 10 days post-operatively; WBAT. - Xarelto therapy once daily for 12 days post-operatively. - Remove AQUACEL bandage on the 7th day post-operatively (PLEASE reference discharge instructions INCISION CARE for additional information). - Remove staples at 2 weeks post-op; 10/30/19 .   - Follow up in Office in 2 weeks.     After Hospital Care Plan:  Discharge Instructions Knee Replacement-Dr. Candie Garcia    Patient Name: Sonny Peralta  Date of procedure: 10/16/2019   Procedure: Procedure(s):  LEFT TOTAL KNEE REPLACEMENT  Surgeon: Solitario Freitas) and Role:     Elvira Velez MD - Primary    PCP: Harsh Salgado MD  Date of discharge: 10/20/2019 11:30 AM    Follow up appointments   Follow up with Dr. Candie Garcia in 2 weeks. Call [phone] to make an appointment.  If home health has been arranged for you the agency will contact you to arrange dates/times for visits. Please call them if you do not hear from them within 24 hours after you are discharged    When to call your Orthopaedic Surgeon: Call [phone]. If you call after 5pm or on a weekend, the on call physician will be contacted   Unrelieved pain   Signs of infection-if your incision is red; continues to have drainage; drainage has a foul odor or if you have a persistent fever over 101 degrees   Signs of a blood clot in your leg-calf pain, tenderness, redness, swelling of lower leg    When to call your Primary Care Physician:   Concerns about medical conditions such as diabetes, high blood pressure, asthma, congestive heart failure   Call if blood sugars are elevated, persistent headache or dizziness, coughing or congestion, constipation or diarrhea, burning with urination, abnormal heart rate    When to call [id number] go to the nearest emergency room   Acute onset of chest pain, shortness of breath, difficulty breathing      Activity   Weight bearing as tolerated with walker or crutches. Refer to your handbook for instructions and pictures   Complete your Home Exercise Program daily as instructed by your therapist.  Refer to your handbook for instructions and pictures   Get up every one hour and walk (except at night when sleeping)   Do not drive or operate heavy machinery    Incision Care   The Aquacel (brown, waterproof) surgical dressing is to remain on your knee for 7 days. On the 7th day have someone gently peel the dressing off by carefully lifting the edge and stretching it slightly to break the adhesive seal   If the home health agency has not removed the Aquacel bandage by the 7th day please remove it yourself   You will have staples in your knee incision.  They will be removed by the home health agency staff   If your Aquacel dressing comes loose/off before the 7th day, you may replace it with a dry sterile gauze dressing; change it daily. Once your incision is not draining, you may leave it open to air   You may take a shower with the Aquacel dressing in place. Once the Aquacel is removed, you may shower and get your incision wet but do not submerge your incision under water in a bath tub, hot tub or swimming pool for 6 weeks after surgery. Preventing blood clots    Take Xarelto once daily as prescribed by Dr. Yoselin Delgado for 12 days following surgery   Wear elastic stockings (TEDS) for 4 weeks. You should remove them for approximately 1 hour daily for showering/sponge bathing    Pain management   Keep ice wrap in place except when walking; changing gel packs every 4 hours   Lie down and elevate your leg on pillows for about 30 minutes after walking to decrease swelling and pain    Do ankle pumps (10 repetitions) every hour while awake and get up frequently to walk    Take Tylenol 500mg every 6 hours for 2 weeks    Take narcotic pain pill as prescribed if needed. Take with food; avoid alcohol while taking pain medication. Decrease the amount of narcotic pain medication as your pain lessens     Diet   Resume usual diet; drink plenty of fluids; eat foods high in fiber   You may want to take a stool softener (such as Senokot-S or Colace) to prevent constipation while you are taking pain medication.   If constipation occurs, take a laxative (such as Dulcolax tablets, Milk of Magnesia, or a suppository)

## 2019-10-21 NOTE — DISCHARGE SUMMARY
Discharge Summary    Physician: Harjeet Rider MD    Physician Assistant: Damian Chávez PA-C    Admit Diagnosis:  Primary localized osteoarthritis of left knee [M17.12]  Primary localized osteoarthritis of left knee [M17.12]    Final Diagnosis:   1. Advanced degenerative arthritis of left knee . Procedure: Left total knee replacement    Complications: None. History of Present Illness: The patient has a long-standing history of pain within the left knee . The patient has severe degenerative arthritis of the left knee and has exhausted conservative therapy at this time including prior intra-articular injections, activity modifications, OTC NSAIDS. The patient has been limited in their ability to perform ADLs, walk short distances or climb steps appropriately. The patient presents for the above-mentioned procedure. The patient has been cleared from a medical and cardiac standpoint. Past Medical History:  Recorded in the chart. Physical Examination: Also recorded in the chart. The patient is noted for ambulating with an antalgic gait favoring the left side. Examination of the left knee reveals a lack of 5 degrees to full extension and flexion to 116 degrees on the left knee and a lack of 4 degrees to full extension and flexion to 116 degrees on the right knee. She has marked crepitus and tenderness in the patellofemoral joint of both knees. Diffuse tenderness throughout the knee joint. Synovial hypertrophy. The cruciate and collateral ligaments are stable. No effusion. No sign of infection. No ecchymosis or erythema. No cellulitis or rash. No calf pain, no evidence of DVT. I detect no obvious motor or sensory deficits in the lower extremities. The extensor mechanism is intact. The neurovascular status is intact . X-rays reveal advance degenerative changes to the patellofemoral joint with bone on bone contact. Significant tibial femoral degenerative changes noted.     Course in the Hospital:  The patient was admitted to the hospital.  The Pt. Underwent a left total knee replacment . Postoperatively, the pt. did well. The pt. Remained stable from a medical standpoint. The patient ambulated, WBAT weightbearing within the hospital with Physical Therapy. The patient continued with this therapy at [facility] with PT. The patient began taking Xarelto post-operatively for DVT prophylaxis and will continue on this for 12 days. At the time of discharge, there was no evidence of any DVT noted. The patient had negative Homans signs bilateral lower extremities. At the time of discharge, the patient's left knee incision appeared with staples intact. No signs of infection or inflammation noted. The patient will follow up in our office in 2-1/2 weeks. DC med list:  Discharge Medication List as of 10/20/2019 11:31 AM      START taking these medications    Details   oxyCODONE IR (ROXICODONE) 5 mg immediate release tablet Take 1-2 Tabs by mouth every four (4) hours as needed for Pain for up to 7 days. Max Daily Amount: 60 mg., Print, Disp-60 Tab, R-0      rivaroxaban (XARELTO) 10 mg tablet Take 1 Tab by mouth daily (with lunch) for 12 days. Indications: Deep Vein Thrombosis Prevention in Knee Replacement, Print, Disp-12 Tab, R-0      celecoxib (CELEBREX) 200 mg capsule Take 1 Cap by mouth daily for 30 days. , Print, Disp-30 Cap, R-0         CONTINUE these medications which have NOT CHANGED    Details   traZODone (DESYREL) 50 mg tablet Take 50 mg by mouth nightly., Historical Med      atorvastatin (LIPITOR) 10 mg tablet Take 10 mg by mouth nightly., Historical Med      montelukast (SINGULAIR) 10 mg tablet Take 10 mg by mouth nightly., Historical Med      omeprazole (PRILOSEC) 20 mg capsule Take 20 mg by mouth every evening., Historical Med      metFORMIN (GLUMETZA ER) 500 mg TG24 24 hour tablet Take 1 Tab by mouth every evening., Historical Med      escitalopram oxalate (LEXAPRO) 20 mg tablet Take 20 mg by mouth every evening., Historical Med      lisinopril (PRINIVIL, ZESTRIL) 40 mg tablet Take 40 mg by mouth every evening., Historical Med      hydroCHLOROthiazide (HYDRODIURIL) 12.5 mg tablet Take 12.5 mg by mouth every evening., Historical Med      levothyroxine (SYNTHROID) 112 mcg tablet Take 112 mcg by mouth Daily (before breakfast). , Historical Med      clonazePAM (KLONOPIN) 1 mg tablet Take 1 mg by mouth nightly., Historical Med      diclofenac (VOLTAREN) 1 % gel Apply 4 g to affected area daily as needed., Historical Med      Cholecalciferol, Vitamin D3, (VITAMIN D3) 1,000 unit Cap Take 2,000 Units by mouth every evening., Historical Med             Patient Disposition: Home  Followup Care:  Discharge Condition: good  PT/OT per Home Health  Regular Diet  As directed    Follow-up Information     Follow up With Specialties Details Why Contact Info    Harsh Salgado MD [facility]   [address] [place] [address] [facility]   [address]  [facility] [address] [facility]                  Mar Brennan PA-C

## 2019-10-22 PROCEDURE — 3331090002 HH PPS REVENUE DEBIT

## 2019-10-22 PROCEDURE — 3331090001 HH PPS REVENUE CREDIT

## 2019-10-23 ENCOUNTER — HOME CARE VISIT (OUTPATIENT)
Dept: SCHEDULING | Facility: HOME HEALTH | Age: 74
End: 2019-10-23
Payer: MEDICARE

## 2019-10-23 VITALS
HEART RATE: 62 BPM | OXYGEN SATURATION: 98 % | DIASTOLIC BLOOD PRESSURE: 82 MMHG | SYSTOLIC BLOOD PRESSURE: 138 MMHG | RESPIRATION RATE: 16 BRPM | TEMPERATURE: 97.3 F

## 2019-10-23 PROCEDURE — 3331090001 HH PPS REVENUE CREDIT

## 2019-10-23 PROCEDURE — 3331090002 HH PPS REVENUE DEBIT

## 2019-10-23 PROCEDURE — G0151 HHCP-SERV OF PT,EA 15 MIN: HCPCS

## 2019-10-24 PROCEDURE — 3331090001 HH PPS REVENUE CREDIT

## 2019-10-24 PROCEDURE — 3331090002 HH PPS REVENUE DEBIT

## 2019-10-25 ENCOUNTER — HOME CARE VISIT (OUTPATIENT)
Dept: SCHEDULING | Facility: HOME HEALTH | Age: 74
End: 2019-10-25
Payer: MEDICARE

## 2019-10-25 VITALS
SYSTOLIC BLOOD PRESSURE: 130 MMHG | RESPIRATION RATE: 16 BRPM | OXYGEN SATURATION: 97 % | DIASTOLIC BLOOD PRESSURE: 68 MMHG | HEART RATE: 58 BPM | TEMPERATURE: 97.3 F

## 2019-10-25 PROCEDURE — 3331090002 HH PPS REVENUE DEBIT

## 2019-10-25 PROCEDURE — G0151 HHCP-SERV OF PT,EA 15 MIN: HCPCS

## 2019-10-25 PROCEDURE — 3331090001 HH PPS REVENUE CREDIT

## 2019-10-26 PROCEDURE — 3331090002 HH PPS REVENUE DEBIT

## 2019-10-26 PROCEDURE — 3331090001 HH PPS REVENUE CREDIT

## 2019-10-27 PROCEDURE — 3331090001 HH PPS REVENUE CREDIT

## 2019-10-27 PROCEDURE — 3331090002 HH PPS REVENUE DEBIT

## 2019-10-28 PROCEDURE — 3331090001 HH PPS REVENUE CREDIT

## 2019-10-28 PROCEDURE — 3331090002 HH PPS REVENUE DEBIT

## 2019-10-29 PROCEDURE — 3331090002 HH PPS REVENUE DEBIT

## 2019-10-29 PROCEDURE — 3331090001 HH PPS REVENUE CREDIT

## 2019-10-30 ENCOUNTER — HOME CARE VISIT (OUTPATIENT)
Dept: SCHEDULING | Facility: HOME HEALTH | Age: 74
End: 2019-10-30
Payer: MEDICARE

## 2019-10-30 VITALS
HEART RATE: 58 BPM | OXYGEN SATURATION: 96 % | RESPIRATION RATE: 14 BRPM | TEMPERATURE: 96.9 F | SYSTOLIC BLOOD PRESSURE: 118 MMHG | DIASTOLIC BLOOD PRESSURE: 74 MMHG

## 2019-10-30 PROCEDURE — G0151 HHCP-SERV OF PT,EA 15 MIN: HCPCS

## 2019-10-30 PROCEDURE — 3331090001 HH PPS REVENUE CREDIT

## 2019-10-30 PROCEDURE — 3331090002 HH PPS REVENUE DEBIT

## 2020-09-29 ENCOUNTER — TRANSCRIBE ORDER (OUTPATIENT)
Dept: SCHEDULING | Age: 75
End: 2020-09-29

## 2020-09-29 DIAGNOSIS — Z12.31 SCREENING MAMMOGRAM FOR HIGH-RISK PATIENT: Primary | ICD-10-CM

## 2020-09-30 ENCOUNTER — HOSPITAL ENCOUNTER (OUTPATIENT)
Dept: MAMMOGRAPHY | Age: 75
Discharge: HOME | End: 2020-09-30
Attending: FAMILY MEDICINE
Payer: MEDICARE

## 2020-09-30 DIAGNOSIS — Z12.31 SCREENING MAMMOGRAM FOR HIGH-RISK PATIENT: ICD-10-CM

## 2020-09-30 PROCEDURE — 77067 SCR MAMMO BI INCL CAD: CPT

## 2021-05-31 ENCOUNTER — APPOINTMENT (OUTPATIENT)
Dept: GENERAL RADIOLOGY | Age: 76
End: 2021-05-31
Attending: EMERGENCY MEDICINE
Payer: MEDICARE

## 2021-05-31 ENCOUNTER — HOSPITAL ENCOUNTER (EMERGENCY)
Age: 76
Discharge: HOME | End: 2021-05-31
Attending: EMERGENCY MEDICINE | Admitting: EMERGENCY MEDICINE
Payer: MEDICARE

## 2021-05-31 VITALS
DIASTOLIC BLOOD PRESSURE: 72 MMHG | SYSTOLIC BLOOD PRESSURE: 161 MMHG | HEIGHT: 64 IN | HEART RATE: 66 BPM | OXYGEN SATURATION: 94 % | BODY MASS INDEX: 34.15 KG/M2 | RESPIRATION RATE: 18 BRPM | TEMPERATURE: 97.8 F | WEIGHT: 200 LBS

## 2021-05-31 DIAGNOSIS — M25.561 ACUTE PAIN OF RIGHT KNEE: Primary | ICD-10-CM

## 2021-05-31 DIAGNOSIS — M11.20 PSEUDOGOUT: ICD-10-CM

## 2021-05-31 PROCEDURE — 74011250637 HC RX REV CODE- 250/637: Performed by: EMERGENCY MEDICINE

## 2021-05-31 PROCEDURE — 99284 EMERGENCY DEPT VISIT MOD MDM: CPT

## 2021-05-31 PROCEDURE — 74011636637 HC RX REV CODE- 636/637: Performed by: EMERGENCY MEDICINE

## 2021-05-31 PROCEDURE — 73562 X-RAY EXAM OF KNEE 3: CPT

## 2021-05-31 RX ORDER — IBUPROFEN 400 MG/1
400 TABLET ORAL
Status: COMPLETED | OUTPATIENT
Start: 2021-05-31 | End: 2021-05-31

## 2021-05-31 RX ORDER — PREDNISONE 20 MG/1
20 TABLET ORAL
Status: COMPLETED | OUTPATIENT
Start: 2021-05-31 | End: 2021-05-31

## 2021-05-31 RX ORDER — IBUPROFEN 400 MG/1
400 TABLET ORAL
Qty: 20 TABLET | Refills: 0 | Status: SHIPPED | OUTPATIENT
Start: 2021-05-31 | End: 2021-11-24

## 2021-05-31 RX ORDER — PREDNISONE 20 MG/1
20 TABLET ORAL DAILY
Qty: 5 TABLET | Refills: 0 | Status: SHIPPED | OUTPATIENT
Start: 2021-05-31 | End: 2021-06-05

## 2021-05-31 RX ADMIN — IBUPROFEN 400 MG: 400 TABLET, FILM COATED ORAL at 13:08

## 2021-05-31 RX ADMIN — PREDNISONE 20 MG: 20 TABLET ORAL at 13:08

## 2021-05-31 NOTE — ED PROVIDER NOTES
HPI .  Patient has a history of anxiety, depression, asthma, diabetes, hypertension, hypothyroidism, seizure disorder and vitamin D deficiency. Patient reports having a left knee replacement. She does not use a walker or cane. She had a cortisone shot in her right knee 30 days ago. Patient did well until this morning when she woke up with pain in the right knee. She has not noted any redness or swelling. There has been no change in her posterior knee lesion which may be a Baker's cyst.  She has not fallen. She has had no injury. She has no history of gout. She has had no fever. Past Medical History:   Diagnosis Date    Anxiety state, unspecified     Arthritis     knes and back , foot osteoarthritis    Asthma     [facility] [address]     Hypertension     Hypothyroidism     Menopause     LMP-45years old?  Morbid obesity ([address]     Psychiatric disorder     ANXIETY, DEPRESSION    Seizures ([facility] [address]     had a seizure as a result of medication about 50 years ago    Vitamin D deficiency 5/11/2010       Past Surgical History:   Procedure Laterality Date    ENDOSCOPY, COLON, DIAGNOSTIC  9/13/2010    -due again 9/2015    HX CHOLECYSTECTOMY      HX GI  06/2019    COLONOSCOPY    HX HERNIA REPAIR      x 2    HX TONSILLECTOMY      IMPLANT BREAST SILICONE/EQ Bilateral 8990    Lt implant was removed due to leakage.     AL BREAST SURGERY PROCEDURE UNLISTED      implants for deformity, and removed         Family History:   Problem Relation Age of Onset    Cancer Mother         colon     Alcohol abuse Sister     Breast Cancer Maternal Aunt         age unknown    Hypertension Son     Psoriasis Son     Anesth Problems Neg Hx        Social History     Socioeconomic History    Marital status:      Spouse name: Not on file    Number of children: Not on file    Years of education: Not on file    Highest education level: Not on file   Occupational History    Not on file   Tobacco Use    Smoking status: Former Smoker     Packs/day: 0.50     Years: 40.00     Pack years: 20.00     Quit date: 2003     Years since quittin.1    Smokeless tobacco: Never Used   Substance and Sexual Activity    Alcohol use: No    Drug use: No    Sexual activity: Never   Other Topics Concern    Not on file   Social History Narrative    Not on file     Social Determinants of Health     Financial Resource Strain:     Difficulty of Paying Living Expenses:    Food Insecurity:     Worried About Running Out of Food in the Last Year:     [address] in the Last Year:    Transportation Needs:     Lack of Transportation (Medical):  Lack of Transportation (Non-Medical):    Physical Activity:     Days of Exercise per Week:     Minutes of Exercise per Session:    Stress:     Feeling of Stress :    Social Connections:     Frequency of Communication with Friends and Family:     Frequency of Social Gatherings with Friends and Family:     Attends Restoration Services:     Active Member of Clubs or Organizations:     Attends Club or Organization Meetings:     Marital Status:    Intimate Partner Violence:     Fear of Current or Ex-Partner:     Emotionally Abused:     Physically Abused:     Sexually Abused: ALLERGIES: Allegra [fexofenadine], Codeine, Keflex [cephalexin], and Tussionex    Review of Systems   All other systems reviewed and are negative. Vitals:    21 1106   BP: (!) 161/72   Pulse: 66   Resp: 18   Temp: 97.8 °F (36.6 °C)   SpO2: 94%   Weight: 90.7 kg (200 lb)   Height: 5' 4\" (1.626 m)            Physical Exam  Vitals and nursing note reviewed. Constitutional:       Appearance: She is well-developed. HENT:      Head: Normocephalic and atraumatic. Eyes:      Pupils: Pupils are equal, round, and reactive to light. Cardiovascular:      Rate and Rhythm: Normal rate and regular rhythm. Heart sounds: Normal heart sounds. No murmur heard. No friction rub. No gallop. Pulmonary:      Effort: Pulmonary effort is normal. No respiratory distress. Breath sounds: No wheezing or rales. Abdominal:      Palpations: Abdomen is soft. Tenderness: There is no abdominal tenderness. There is no rebound. Musculoskeletal:         General: No tenderness. Normal range of motion. Cervical back: Normal range of motion and neck supple. Comments: Left knee replacement scar; right knee is not noticeably red tender or swollen; passive range of motion does not cause significant pain   Skin:     Findings: No erythema. Neurological:      Mental Status: She is alert. Cranial Nerves: No cranial nerve deficit. Comments: Motor; symmetric   Psychiatric:         Behavior: Behavior normal.          MDM       Procedures          Note: Patient was unable to walk out of the ER. After p.o. prednisone and Motrin surprisingly she could walk quite well.    Candie George MD  5:29 PM

## 2021-05-31 NOTE — ED TRIAGE NOTES
Pt arrived via EMS from home c/o chronic R knee pain. Pt received a cortisone shot on 4/30/21. Pt reports she has been walking with minimal pain but woke up this morning with increased pain and inability to walk. Pt denies fall/injury.

## 2021-05-31 NOTE — ED NOTES
Pt attempted toe ambulate. Upon standing, pt states she has too much pain and cannot walk. Dr Hernández Buys updated.

## 2021-07-20 ENCOUNTER — HOSPITAL ENCOUNTER (OUTPATIENT)
Dept: PREADMISSION TESTING | Age: 76
Discharge: HOME | End: 2021-07-20
Payer: MEDICARE

## 2021-07-20 VITALS
DIASTOLIC BLOOD PRESSURE: 75 MMHG | HEART RATE: 60 BPM | TEMPERATURE: 98.3 F | WEIGHT: 201.5 LBS | HEIGHT: 64 IN | BODY MASS INDEX: 34.4 KG/M2 | SYSTOLIC BLOOD PRESSURE: 137 MMHG | RESPIRATION RATE: 18 BRPM

## 2021-07-20 PROBLEM — M17.11 PRIMARY LOCALIZED OSTEOARTHRITIS OF RIGHT KNEE: Status: ACTIVE | Noted: 2021-07-20

## 2021-07-20 LAB
ABO + RH BLD: NORMAL
APPEARANCE UR: CLEAR
BACTERIA URNS QL MICRO: NEGATIVE /HPF
BILIRUB UR QL: NEGATIVE
BLOOD GROUP ANTIBODIES SERPL: NORMAL
COLOR UR: ABNORMAL
EPITH CASTS URNS QL MICRO: ABNORMAL /LPF
GLUCOSE UR STRIP.AUTO-MCNC: NEGATIVE MG/DL
HGB UR QL STRIP: NEGATIVE
HYALINE CASTS URNS QL MICRO: ABNORMAL /LPF (ref 0–5)
INR PPP: 1 (ref 0.9–1.1)
KETONES UR QL STRIP.AUTO: NEGATIVE MG/DL
LEUKOCYTE ESTERASE UR QL STRIP.AUTO: ABNORMAL
NITRITE UR QL STRIP.AUTO: NEGATIVE
PH UR STRIP: 7 [PH] (ref 5–8)
PROT UR STRIP-MCNC: NEGATIVE MG/DL
PROTHROMBIN TIME: 10.7 SEC (ref 9–11.1)
RBC #/AREA URNS HPF: ABNORMAL /HPF (ref 0–5)
SP GR UR REFRACTOMETRY: 1.01 (ref 1–1.03)
SPECIMEN EXP DATE BLD: NORMAL
UA: UC IF INDICATED,UAUC: ABNORMAL
UROBILINOGEN UR QL STRIP.AUTO: 0.2 EU/DL (ref 0.2–1)
WBC URNS QL MICRO: ABNORMAL /HPF (ref 0–4)

## 2021-07-20 PROCEDURE — 86900 BLOOD TYPING SEROLOGIC ABO: CPT

## 2021-07-20 PROCEDURE — 85610 PROTHROMBIN TIME: CPT

## 2021-07-20 PROCEDURE — 81001 URINALYSIS AUTO W/SCOPE: CPT

## 2021-07-20 PROCEDURE — 36415 COLL VENOUS BLD VENIPUNCTURE: CPT

## 2021-07-20 RX ORDER — GUAIFENESIN 100 MG/5ML
81 LIQUID (ML) ORAL
COMMUNITY
End: 2021-07-30

## 2021-07-20 RX ORDER — CLONAZEPAM 0.5 MG/1
0.5 TABLET ORAL
COMMUNITY
End: 2021-11-24

## 2021-07-20 RX ORDER — FAMOTIDINE 20 MG/1
20 TABLET, FILM COATED ORAL
COMMUNITY
End: 2021-11-29 | Stop reason: SDUPTHER

## 2021-07-20 RX ORDER — CLONAZEPAM 0.5 MG/1
0.5 TABLET ORAL
COMMUNITY
End: 2022-02-02 | Stop reason: SDUPTHER

## 2021-07-20 NOTE — H&P
Patient ID: Yuniel Dockery is a 67 y.o. female.     Chief Complaint: Pain of the Right Knee        Yuniel Dockery is a 67 y.o. female who returns for follow up of right knee. Patient has a history of left total knee arthroplasty. She reports an onset of sudden right knee pain while getting out of bed a week ago. She states she had to hold onto wall and other things on her way to get to the bathroom. She was diagnosed with severe osteoarthritis of the right knee and is scheduled to undergo right total knee arthroplasty. She rates the pain to be 6 out of 10 in intensity at this time. She currently does not have any radiculopathy symptoms such as radiation of the pain, numbness, tingling or other complaints. Patient is here to discuss the right total knee arthroplasty procedure.     Review of Systems   7/16/2021     Constitutional: Unexplained: Negative  Genitourinary: Frequent Urination: Negative  HEENT: Vision Loss: Negative  Neurological: Memory Loss: Negative  Integumentary: Rash: Negative  Cardiovascular: Palpatations: Negative  Hematologic: Bruises/Bleeds Easily: Positive  Gastrointestinal: Constipation: Negative  Immunological: Seasonal Allergies: Positive  Musculoskeletal: Joint Pain: Positive     Medical History        Past Medical History:   Diagnosis Date    Anxiety      Asthma      Depression      Diabetes mellitus      Hypertension              Surgical History         Past Surgical History:   Procedure Laterality Date    BREAST RECONSTRUCTION        CARPAL TUNNEL RELEASE        CHOLECYSTECTOMY        FOOT SURGERY        TONSILLECTOMY        UMBILICAL HERNIA REPAIR                Objective:          Vitals:     07/16/21 0850   BP: 131/77         Constitutional:  No acute distress. Well nourished. Well developed. Eyes:  Sclera are nonicteric. Respiratory:  No labored breathing. Cardiovascular:  No marked edema. Skin:  No marked skin ulcers.   Neurological:  No marked sensory loss noted.  Psychiatric: Alert and oriented x3. Musculoskeletal      Examination of the RIGHT knee reveals 1-112 degrees. Skin intact. The cruciate and collateral ligaments are stable. No effusion. No sign of infection. No ecchymosis or erythema. No cellulitis or rash. No calf pain, swelling, or other evidence of DVT. I detect no obvious motor or sensory deficits in the lower extremities. The extensor mechanism is intact. The neurovascular status is intact.     Imaging/Studies:      Order: XR KNEE 4+ VW RIGHT - Indication: Primary osteoarthritis of right   knee        X-ray knee right 4+ views ([id number])     Result Date: 6/4/2021  Views: Standing AP, Lat, Langerma, Apryl Flight.      Impression: I ordered and interpreted x-rays of the RIGHT knee which reveal advanced degenerative changes in the patellofemoral with a lateral shift to the patella. 80% medial joint space narrowing. Bone fragment in the lateral aspect. No fractures or evidence of metastatic disease.      Assessment:   There is no problem list on file for this patient.            ICD-10-CM   1. Primary osteoarthritis of right knee  M17.11   2. Synovitis of right knee  M65.9   3. History of left knee replacement  Z96.652         Plan:   I personally reviewed my findings with the patient. I reviewed the pathophysiology and explained that she has advanced degenerative changes of the right knee. I reviewed different procedural vs non-procedural treatment options. I have explained to the patient that the conservative measures might have reduced efficacy because of the level of arthritis and pathology endorsed by the patient. I explained to her the right total knee arthroplasty procedure. I explained the hospitalization, post-op and rehabilitation for the procedure. We discussed all complications associated with the surgery. She understands the potential risk of infection and that she would be on antibiotics following the surgery.  She understands the potential risk of DVT/PE and that she would be on an anticoagulant following surgery. We discussed the course of post-op physical therapy for rehabilitation.      PROCEDURE: Right total knee replacement. Date of Surgery Update:  Sushil Blackwood was seen and examined. Past Medical History:   Diagnosis Date    Arthritis     knes and back , foot osteoarthritis    Asthma     Depression with anxiety     Diabetes [facility] [address]     Gout     History of pelvic fracture     Hypertension     Hypothyroidism     Menopause     LMP-45years old?  Morbid obesity ([facility] [address]     [facility]     had a seizure as a result of medication about 50 years ago    Vitamin D deficiency 5/11/2010     Prior to Admission Medications   Prescriptions Last Dose Informant Patient Reported? Taking? Cholecalciferol, Vitamin D3, (VITAMIN D3) 1,000 unit Cap 7/27/2021 at Unknown time Self Yes Yes   Sig: Take 2,000 Units by mouth every evening. aspirin 81 mg chewable tablet 7/27/2021 at Unknown time  Yes Yes   Sig: Take 81 mg by mouth every morning. atorvastatin (LIPITOR) 10 mg tablet 7/27/2021 at Unknown time Self Yes Yes   Sig: Take 10 mg by mouth nightly. clonazePAM (KlonoPIN) 0.5 mg tablet 7/27/2021 at Unknown time  Yes Yes   Sig: Take 0.5 mg by mouth nightly. clonazePAM (KlonoPIN) 0.5 mg tablet  Self Yes No   Sig: Take 0.5 mg by mouth daily as needed for Anxiety. escitalopram oxalate (LEXAPRO) 20 mg tablet 7/27/2021 at Unknown time Self Yes Yes   Sig: Take 20 mg by mouth every evening. famotidine (Pepcid) 20 mg tablet 7/27/2021 at Unknown time  Yes Yes   Sig: Take 20 mg by mouth nightly. hydroCHLOROthiazide (HYDRODIURIL) 12.5 mg tablet 7/27/2021 at Unknown time Self Yes Yes   Sig: Take 12.5 mg by mouth every evening.    ibuprofen (MOTRIN) 400 mg tablet 7/24/2021  No No   Sig: Take 1 Tablet by mouth three (3) times daily as needed for Pain.   levothyroxine (SYNTHROID) 112 mcg tablet 7/28/2021 at 0430 Self Yes Yes   Sig: Take 112 mcg by mouth Daily (before breakfast). lisinopril (PRINIVIL, ZESTRIL) 40 mg tablet 7/28/2021 at 0430 Self Yes Yes   Sig: Take 40 mg by mouth every morning. metFORMIN (GLUMETZA ER) 500 mg TG24 24 hour tablet 7/26/2021 Self Yes No   Sig: Take 1 Tab by mouth every evening.   montelukast (SINGULAIR) 10 mg tablet 7/27/2021 at Unknown time Self Yes Yes   Sig: Take 10 mg by mouth nightly. mupirocin (BACTROBAN) 2 % ointment 7/27/2021 at Unknown time  No Yes   Sig: by Both Nostrils route two (2) times a day for 7 days. Apply 0.25 g (small pea-sized amount) to both nostrils twice a day for seven days.      traZODone (DESYREL) 50 mg tablet 7/27/2021 at Unknown time Self Yes Yes   Sig: Take 50 mg by mouth nightly. Facility-Administered Medications: None      Allergy to: Allegra [fexofenadine], Codeine, Keflex [cephalexin], and Tussionex  Physical Examination: General appearance - alert, well appearing, and in no distress  History and physical has been reviewed. The patient has been examined.  There have been no significant clinical changes since the completion of the originally dated History and Physical.    Signed By: Zena Matta PA-C     July 28, 2021 7:28 AM

## 2021-07-20 NOTE — PERIOP NOTES
Preoperative and medication instructions reviewed with patient , surgical site infection sheet given,  MRSA instructions given and reviewed chg soap x 2 given and instructions on how to use chg soap correctly. Patient given opportunity to ask questions and all questions were answered.  Information given regarding  arrival to hospital on day of surgery    COVID VACCINE GIVEN , [address] AND EKG ON CHART FROM PCP OFFICE FROM 07/02/2021

## 2021-07-21 LAB
BACTERIA SPEC CULT: NORMAL
BACTERIA SPEC CULT: NORMAL
SERVICE CMNT-IMP: NORMAL

## 2021-07-21 RX ORDER — MUPIROCIN 20 MG/G
OINTMENT TOPICAL 2 TIMES DAILY
Qty: 22 G | Refills: 0 | OUTPATIENT
Start: 2021-07-21 | End: 2021-07-28

## 2021-07-21 NOTE — PERIOP NOTES
PC to pt, full name and  verified, regarding positive nasal cx (MSSA) and need to start Mupirocin ointment BID x 7 days to B nostrils starting today and bathe with CHG soap for 7 days prior to surgery. Pt verbalized understanding of instructions and will start today as recommended. Allergies and pharmacy of choice reviewed. Rx escribed to pt's pharmacy of choice. PTA medlist updated. Surgeon and PCP notified of positive culture and treatment. PRESCRIPTION:    MUPIROCIN 2% OINTMENT  QUANTITY:  #22 GRAMS  REFILLS: NONE    Apply 0.25 g (small pea-sized amount) to both nostrils twice a day for seven days.     Anjelica Nugent NP

## 2021-07-21 NOTE — PERIOP NOTES
PAT Nurse Practitioner   Pre-Operative Chart Review/Assessment:-ORTHOPEDIC                Patient Name:  Cony Stone                                                           Age:   67 y.o.    :  1945     Today's Date:  2021     Date of PAT:   2021      Date of Surgery:    2021      Procedure(s):  Right Total Knee Arthroplasty     Surgeon:   Dr. Jen Nassar                       PLAN:      1)  Medical Clearance:  Dr. Angelica Le      2)  Cardiac Clearance:  EKG and METs reviewed. No further cardiac evaluation requested. 3)  Diabetic Treatment Consult:  Not indicated. A1c-6.8      4)  Sleep Apnea evaluation:   Not indicated. PIPO Score 1.       5) Treatment for MRSA/Staph Aureus:  +MSSA. Tx w/ mupirocin      6) Additional Concerns:  HTN, Asthma, T2DM, dep/anx                Vital Signs:         Vitals:    21 0940   BP: 137/75   Pulse: 60   Resp: 18   Temp: 98.3 °F (36.8 °C)   Weight: 91.4 kg (201 lb 8 oz)   Height: 5' 4\" (1.626 m)   LMP: 1992            ____________________________________________  PAST MEDICAL HISTORY  Past Medical History:   Diagnosis Date    Arthritis     knes and back , foot osteoarthritis    Asthma     Depression with anxiety     Diabetes [facility]     Gout     History of pelvic fracture     Hypertension     Hypothyroidism     Menopause     LMP-45years old?     Morbid obesity ([facility]     Seizures ([facility]     had a seizure as a result of medication about 50 years ago    Vitamin D deficiency 2010      ____________________________________________  PAST SURGICAL HISTORY  Past Surgical History:   Procedure Laterality Date    ENDOSCOPY, COLON, DIAGNOSTIC  2010    -due again 2015    HX BREAST AUGMENTATION Left     removal of implant    HX BUNIONECTOMY Left     HX CHOLECYSTECTOMY      HX GI  2019    COLONOSCOPY    HX HERNIA REPAIR      x 2    HX TONSILLECTOMY      IMPLANT BREAST SILICONE/EQ Bilateral 3552    Lt implant was removed due to leakage.  TN BREAST SURGERY PROCEDURE UNLISTED      implants for deformity, and removed      ____________________________________________  HOME MEDICATIONS  Current Outpatient Medications   Medication Sig    clonazePAM (KlonoPIN) 0.5 mg tablet Take 0.5 mg by mouth nightly.  clonazePAM (KlonoPIN) 0.5 mg tablet Take 0.5 mg by mouth daily as needed for Anxiety.  famotidine (Pepcid) 20 mg tablet Take 20 mg by mouth nightly.  aspirin 81 mg chewable tablet Take 81 mg by mouth every morning.  traZODone (DESYREL) 50 mg tablet Take 50 mg by mouth nightly.  atorvastatin (LIPITOR) 10 mg tablet Take 10 mg by mouth nightly.  montelukast (SINGULAIR) 10 mg tablet Take 10 mg by mouth nightly.  metFORMIN (GLUMETZA ER) 500 mg TG24 24 hour tablet Take 1 Tab by mouth every evening.  escitalopram oxalate (LEXAPRO) 20 mg tablet Take 20 mg by mouth every evening.  lisinopril (PRINIVIL, ZESTRIL) 40 mg tablet Take 40 mg by mouth every morning.  hydroCHLOROthiazide (HYDRODIURIL) 12.5 mg tablet Take 12.5 mg by mouth every evening.  levothyroxine (SYNTHROID) 112 mcg tablet Take 112 mcg by mouth Daily (before breakfast).  Cholecalciferol, Vitamin D3, (VITAMIN D3) 1,000 unit Cap Take 2,000 Units by mouth every evening.  ibuprofen (MOTRIN) 400 mg tablet Take 1 Tablet by mouth three (3) times daily as needed for Pain.      No current facility-administered medications for this encounter.      ____________________________________________  ALLERGIES  Allergies   Allergen Reactions    Allegra [Fexofenadine] Other (comments)     High anxiety patient states    Codeine Anxiety    Keflex [Cephalexin] Other (comments)     hallunications    Tussionex Other (comments)     Patient states she gags when she takes this        ____________________________________________  SOCIAL HISTORY  Social History     Tobacco Use    Smoking status: Former Smoker     Packs/day: 0.50     Years: 40.00 Pack years: 20.00     Quit date: 2003     Years since quittin.3    Smokeless tobacco: Never Used   Substance Use Topics    Alcohol use: No      ____________________________________________        Labs:     CBC, BMP, A1c and EKG on chart. Hospital Outpatient Visit on 2021   Component Date Value Ref Range Status    Crossmatch Expiration 2021,2359   Final    ABO/Rh(D) 2021 O POSITIVE   Final    Antibody screen 2021 NEG   Final    INR 2021 1.0  0.9 - 1.1   Final    A single therapeutic range for Vit K antagonists may not be optimal for all indications - see  issue of Chest, American College of Chest [employer]icians Evidence-Based Clinical Practice Guidelines, 8th Edition.  Prothrombin time 2021 10.7  9.0 - 11.1 sec Final    Color 2021 YELLOW/STRAW    Final    Color Reference Range: Straw, Yellow or Dark Yellow    Appearance 2021 CLEAR  CLEAR   Final    Specific gravity 2021 1.008  1.003 - 1.030   Final    pH (UA) 2021 7.0  5.0 - 8.0   Final    Protein 2021 Negative  NEG mg/dL Final    Glucose 2021 Negative  NEG mg/dL Final    Ketone 2021 Negative  NEG mg/dL Final    Bilirubin 2021 Negative  NEG   Final    Blood 2021 Negative  NEG   Final    Urobilinogen 2021 0.2  0.2 - 1.0 EU/dL Final    Nitrites 2021 Negative  NEG   Final    Leukocyte Esterase 2021 TRACE* NEG   Final    UA:UC IF INDICATED 2021 CULTURE NOT INDICATED BY UA RESULT  CNI   Final    WBC 2021 0-4  0 - 4 /hpf Final    RBC 2021 0-5  0 - 5 /hpf Final    Epithelial cells 2021 FEW  FEW /lpf Final    Epithelial cell category consists of squamous cells and /or transitional urothelial cells. Renal tubular cells, if present, are separately identified as such.     Bacteria 2021 Negative  NEG /hpf Final    Hyaline cast 2021 0-2  0 - 5 /lpf Final    Special Requests: 07/20/2021 NO SPECIAL REQUESTS    Final    Culture result: 07/20/2021 MRSA NOT PRESENT. Apparent Staphylococus aureus (not MRSA noted). Final       Skin:     Denies open wounds, cuts, sores, rashes or other areas of concern in PAT assessment.           Farzad Vaca, NP

## 2021-07-28 ENCOUNTER — ANESTHESIA (OUTPATIENT)
Dept: SURGERY | Age: 76
End: 2021-07-28
Payer: MEDICARE

## 2021-07-28 ENCOUNTER — HOSPITAL ENCOUNTER (OUTPATIENT)
Age: 76
Discharge: HOME | End: 2021-07-30
Attending: ORTHOPAEDIC SURGERY | Admitting: ORTHOPAEDIC SURGERY
Payer: MEDICARE

## 2021-07-28 ENCOUNTER — ANESTHESIA EVENT (OUTPATIENT)
Dept: SURGERY | Age: 76
End: 2021-07-28
Payer: MEDICARE

## 2021-07-28 DIAGNOSIS — Z96.659 STATUS POST KNEE REPLACEMENT, UNSPECIFIED LATERALITY: Primary | ICD-10-CM

## 2021-07-28 LAB
GLUCOSE BLD STRIP.AUTO-MCNC: 128 MG/DL (ref 65–117)
SERVICE CMNT-IMP: ABNORMAL

## 2021-07-28 PROCEDURE — 97161 PT EVAL LOW COMPLEX 20 MIN: CPT

## 2021-07-28 PROCEDURE — 77030003601 HC NDL NRV BLK BBMI -A

## 2021-07-28 PROCEDURE — 82962 GLUCOSE BLOOD TEST: CPT

## 2021-07-28 PROCEDURE — 74011250636 HC RX REV CODE- 250/636: Performed by: ORTHOPAEDIC SURGERY

## 2021-07-28 PROCEDURE — 74011250637 HC RX REV CODE- 250/637: Performed by: PHYSICIAN ASSISTANT

## 2021-07-28 PROCEDURE — 74011250636 HC RX REV CODE- 250/636: Performed by: ANESTHESIOLOGY

## 2021-07-28 PROCEDURE — 77030014077 HC TOWER MX CEM J&J -C: Performed by: ORTHOPAEDIC SURGERY

## 2021-07-28 PROCEDURE — 74011250637 HC RX REV CODE- 250/637: Performed by: ANESTHESIOLOGY

## 2021-07-28 PROCEDURE — 2709999900 HC NON-CHARGEABLE SUPPLY: Performed by: ORTHOPAEDIC SURGERY

## 2021-07-28 PROCEDURE — 76010000171 HC OR TIME 2 TO 2.5 HR INTENSV-TIER 1: Performed by: ORTHOPAEDIC SURGERY

## 2021-07-28 PROCEDURE — 77030012893

## 2021-07-28 PROCEDURE — 97530 THERAPEUTIC ACTIVITIES: CPT

## 2021-07-28 PROCEDURE — 77030005513 HC CATH URETH FOL11 MDII -B: Performed by: ORTHOPAEDIC SURGERY

## 2021-07-28 PROCEDURE — 74011250636 HC RX REV CODE- 250/636: Performed by: PHYSICIAN ASSISTANT

## 2021-07-28 PROCEDURE — 77030040922 HC BLNKT HYPOTHRM STRY -A

## 2021-07-28 PROCEDURE — 2709999900 HC NON-CHARGEABLE SUPPLY

## 2021-07-28 PROCEDURE — 77030028907 HC WRP KNEE WO BGS SOLM -B

## 2021-07-28 PROCEDURE — 77030006835 HC BLD SAW SAG STRY -B: Performed by: ORTHOPAEDIC SURGERY

## 2021-07-28 PROCEDURE — C1776 JOINT DEVICE (IMPLANTABLE): HCPCS | Performed by: ORTHOPAEDIC SURGERY

## 2021-07-28 PROCEDURE — 74011000250 HC RX REV CODE- 250: Performed by: ORTHOPAEDIC SURGERY

## 2021-07-28 PROCEDURE — 74011000258 HC RX REV CODE- 258: Performed by: ORTHOPAEDIC SURGERY

## 2021-07-28 PROCEDURE — 77030012935 HC DRSG AQUACEL BMS -B: Performed by: ORTHOPAEDIC SURGERY

## 2021-07-28 PROCEDURE — 76060000035 HC ANESTHESIA 2 TO 2.5 HR: Performed by: ORTHOPAEDIC SURGERY

## 2021-07-28 PROCEDURE — 77030008462 HC STPLR SKN PROX J&J -A: Performed by: ORTHOPAEDIC SURGERY

## 2021-07-28 PROCEDURE — 97116 GAIT TRAINING THERAPY: CPT

## 2021-07-28 PROCEDURE — C9290 INJ, BUPIVACAINE LIPOSOME: HCPCS | Performed by: ORTHOPAEDIC SURGERY

## 2021-07-28 PROCEDURE — 77030000032 HC CUF TRNQT ZIMM -B: Performed by: ORTHOPAEDIC SURGERY

## 2021-07-28 PROCEDURE — 76210000016 HC OR PH I REC 1 TO 1.5 HR: Performed by: ORTHOPAEDIC SURGERY

## 2021-07-28 PROCEDURE — 77030035236 HC SUT PDS STRATFX BARB J&J -B: Performed by: ORTHOPAEDIC SURGERY

## 2021-07-28 PROCEDURE — C1713 ANCHOR/SCREW BN/BN,TIS/BN: HCPCS | Performed by: ORTHOPAEDIC SURGERY

## 2021-07-28 PROCEDURE — 77030039825 HC MSK NSL PAP SUPERNO2VA VYRM -B: Performed by: ANESTHESIOLOGY

## 2021-07-28 PROCEDURE — 77030040361 HC SLV COMPR DVT MDII -B: Performed by: ORTHOPAEDIC SURGERY

## 2021-07-28 PROCEDURE — 77030039497 HC CST PAD STERILE CHCS -A: Performed by: ORTHOPAEDIC SURGERY

## 2021-07-28 PROCEDURE — 77030041690 HC SYS PINNING KN JNJ -D: Performed by: ORTHOPAEDIC SURGERY

## 2021-07-28 PROCEDURE — 77030018673: Performed by: ORTHOPAEDIC SURGERY

## 2021-07-28 PROCEDURE — 74011000250 HC RX REV CODE- 250: Performed by: ANESTHESIOLOGY

## 2021-07-28 PROCEDURE — 74011250636 HC RX REV CODE- 250/636: Performed by: NURSE ANESTHETIST, CERTIFIED REGISTERED

## 2021-07-28 PROCEDURE — 77030019908 HC STETH ESOPH SIMS -A: Performed by: ANESTHESIOLOGY

## 2021-07-28 PROCEDURE — 77030007866 HC KT SPN ANES BBMI -B: Performed by: ANESTHESIOLOGY

## 2021-07-28 PROCEDURE — 74011000250 HC RX REV CODE- 250: Performed by: NURSE ANESTHETIST, CERTIFIED REGISTERED

## 2021-07-28 PROCEDURE — 77030031139 HC SUT VCRL2 J&J -A: Performed by: ORTHOPAEDIC SURGERY

## 2021-07-28 PROCEDURE — 77030027138 HC INCENT SPIROMETER -A

## 2021-07-28 PROCEDURE — 74011000258 HC RX REV CODE- 258: Performed by: NURSE ANESTHETIST, CERTIFIED REGISTERED

## 2021-07-28 DEVICE — ATTUNE KNEE SYSTEM FEMORAL POSTERIOR STABILIZED SIZE 4 RIGHT CEMENTED
Type: IMPLANTABLE DEVICE | Site: KNEE | Status: FUNCTIONAL
Brand: ATTUNE

## 2021-07-28 DEVICE — ATTUNE PATELLA MEDIALIZED DOME 35MM CEMENTED AOX
Type: IMPLANTABLE DEVICE | Site: KNEE | Status: FUNCTIONAL
Brand: ATTUNE

## 2021-07-28 DEVICE — KNEE K1 TOT HEMI STD CEM IMPL CAPPED SYNTHES: Type: IMPLANTABLE DEVICE | Status: FUNCTIONAL

## 2021-07-28 DEVICE — ATTUNE KNEE SYSTEM TIBIAL INSERT FIXED BEARING POSTERIOR STABILIZED 4 6MM AOX
Type: IMPLANTABLE DEVICE | Site: KNEE | Status: FUNCTIONAL
Brand: ATTUNE

## 2021-07-28 DEVICE — ATTUNE KNEE SYSTEM TIBIAL BASE FIXED BEARING SIZE 4 CEMENTED
Type: IMPLANTABLE DEVICE | Site: KNEE | Status: FUNCTIONAL
Brand: ATTUNE

## 2021-07-28 DEVICE — SMARTSET GHV GENTAMICIN HIGH VISCOSITY BONE CEMENT 40G
Type: IMPLANTABLE DEVICE | Site: KNEE | Status: FUNCTIONAL
Brand: SMARTSET

## 2021-07-28 RX ORDER — AMOXICILLIN 250 MG
1 CAPSULE ORAL 2 TIMES DAILY
Status: DISCONTINUED | OUTPATIENT
Start: 2021-07-28 | End: 2021-07-30 | Stop reason: HOSPADM

## 2021-07-28 RX ORDER — FAMOTIDINE 20 MG/1
20 TABLET, FILM COATED ORAL
Status: DISCONTINUED | OUTPATIENT
Start: 2021-07-28 | End: 2021-07-30 | Stop reason: HOSPADM

## 2021-07-28 RX ORDER — CELECOXIB 200 MG/1
200 CAPSULE ORAL EVERY 12 HOURS
Status: DISCONTINUED | OUTPATIENT
Start: 2021-07-28 | End: 2021-07-30 | Stop reason: HOSPADM

## 2021-07-28 RX ORDER — LIDOCAINE HYDROCHLORIDE 10 MG/ML
0.1 INJECTION, SOLUTION EPIDURAL; INFILTRATION; INTRACAUDAL; PERINEURAL AS NEEDED
Status: DISCONTINUED | OUTPATIENT
Start: 2021-07-28 | End: 2021-07-28 | Stop reason: HOSPADM

## 2021-07-28 RX ORDER — CLONAZEPAM 1 MG/1
0.5 TABLET ORAL
Status: DISCONTINUED | OUTPATIENT
Start: 2021-07-28 | End: 2021-07-30 | Stop reason: HOSPADM

## 2021-07-28 RX ORDER — OXYCODONE HYDROCHLORIDE 5 MG/1
5 TABLET ORAL
Status: DISCONTINUED | OUTPATIENT
Start: 2021-07-28 | End: 2021-07-30 | Stop reason: HOSPADM

## 2021-07-28 RX ORDER — MORPHINE SULFATE 2 MG/ML
2 INJECTION, SOLUTION INTRAMUSCULAR; INTRAVENOUS
Status: DISCONTINUED | OUTPATIENT
Start: 2021-07-28 | End: 2021-07-28 | Stop reason: HOSPADM

## 2021-07-28 RX ORDER — SODIUM CHLORIDE 0.9 % (FLUSH) 0.9 %
5-40 SYRINGE (ML) INJECTION AS NEEDED
Status: DISCONTINUED | OUTPATIENT
Start: 2021-07-28 | End: 2021-07-28 | Stop reason: HOSPADM

## 2021-07-28 RX ORDER — ESCITALOPRAM OXALATE 10 MG/1
20 TABLET ORAL EVERY EVENING
Status: DISCONTINUED | OUTPATIENT
Start: 2021-07-29 | End: 2021-07-30 | Stop reason: HOSPADM

## 2021-07-28 RX ORDER — SODIUM CHLORIDE, SODIUM LACTATE, POTASSIUM CHLORIDE, CALCIUM CHLORIDE 600; 310; 30; 20 MG/100ML; MG/100ML; MG/100ML; MG/100ML
125 INJECTION, SOLUTION INTRAVENOUS CONTINUOUS
Status: DISCONTINUED | OUTPATIENT
Start: 2021-07-28 | End: 2021-07-28 | Stop reason: HOSPADM

## 2021-07-28 RX ORDER — ONDANSETRON 2 MG/ML
4 INJECTION INTRAMUSCULAR; INTRAVENOUS
Status: ACTIVE | OUTPATIENT
Start: 2021-07-28 | End: 2021-07-29

## 2021-07-28 RX ORDER — DIPHENHYDRAMINE HYDROCHLORIDE 50 MG/ML
12.5 INJECTION, SOLUTION INTRAMUSCULAR; INTRAVENOUS AS NEEDED
Status: DISCONTINUED | OUTPATIENT
Start: 2021-07-28 | End: 2021-07-28 | Stop reason: HOSPADM

## 2021-07-28 RX ORDER — OXYCODONE HYDROCHLORIDE 5 MG/1
10 TABLET ORAL
Status: DISCONTINUED | OUTPATIENT
Start: 2021-07-28 | End: 2021-07-30 | Stop reason: HOSPADM

## 2021-07-28 RX ORDER — NALOXONE HYDROCHLORIDE 0.4 MG/ML
0.4 INJECTION, SOLUTION INTRAMUSCULAR; INTRAVENOUS; SUBCUTANEOUS AS NEEDED
Status: DISCONTINUED | OUTPATIENT
Start: 2021-07-28 | End: 2021-07-30 | Stop reason: HOSPADM

## 2021-07-28 RX ORDER — SODIUM CHLORIDE 0.9 % (FLUSH) 0.9 %
5-40 SYRINGE (ML) INJECTION EVERY 8 HOURS
Status: DISCONTINUED | OUTPATIENT
Start: 2021-07-28 | End: 2021-07-28 | Stop reason: HOSPADM

## 2021-07-28 RX ORDER — MONTELUKAST SODIUM 10 MG/1
10 TABLET ORAL
Status: DISCONTINUED | OUTPATIENT
Start: 2021-07-28 | End: 2021-07-30 | Stop reason: HOSPADM

## 2021-07-28 RX ORDER — ATORVASTATIN CALCIUM 10 MG/1
5 TABLET, FILM COATED ORAL
Status: DISCONTINUED | OUTPATIENT
Start: 2021-07-28 | End: 2021-07-30 | Stop reason: HOSPADM

## 2021-07-28 RX ORDER — VANCOMYCIN/0.9 % SOD CHLORIDE 1.5G/250ML
1500 PLASTIC BAG, INJECTION (ML) INTRAVENOUS ONCE
Status: DISCONTINUED | OUTPATIENT
Start: 2021-07-28 | End: 2021-07-28 | Stop reason: ALTCHOICE

## 2021-07-28 RX ORDER — TRANEXAMIC ACID 100 MG/ML
INJECTION, SOLUTION INTRAVENOUS AS NEEDED
Status: DISCONTINUED | OUTPATIENT
Start: 2021-07-28 | End: 2021-07-28 | Stop reason: HOSPADM

## 2021-07-28 RX ORDER — ROPIVACAINE HYDROCHLORIDE 5 MG/ML
INJECTION, SOLUTION EPIDURAL; INFILTRATION; PERINEURAL
Status: COMPLETED | OUTPATIENT
Start: 2021-07-28 | End: 2021-07-28

## 2021-07-28 RX ORDER — ROPIVACAINE HYDROCHLORIDE 5 MG/ML
30 INJECTION, SOLUTION EPIDURAL; INFILTRATION; PERINEURAL ONCE
Status: DISCONTINUED | OUTPATIENT
Start: 2021-07-28 | End: 2021-07-28 | Stop reason: HOSPADM

## 2021-07-28 RX ORDER — HYDROMORPHONE HYDROCHLORIDE 1 MG/ML
0.5 INJECTION, SOLUTION INTRAMUSCULAR; INTRAVENOUS; SUBCUTANEOUS
Status: ACTIVE | OUTPATIENT
Start: 2021-07-28 | End: 2021-07-29

## 2021-07-28 RX ORDER — MIDAZOLAM HYDROCHLORIDE 1 MG/ML
0.5 INJECTION, SOLUTION INTRAMUSCULAR; INTRAVENOUS
Status: DISCONTINUED | OUTPATIENT
Start: 2021-07-28 | End: 2021-07-28 | Stop reason: HOSPADM

## 2021-07-28 RX ORDER — POLYETHYLENE GLYCOL 3350 17 G/17G
17 POWDER, FOR SOLUTION ORAL DAILY
Status: DISCONTINUED | OUTPATIENT
Start: 2021-07-28 | End: 2021-07-30 | Stop reason: HOSPADM

## 2021-07-28 RX ORDER — CELECOXIB 200 MG/1
200 CAPSULE ORAL ONCE
Status: COMPLETED | OUTPATIENT
Start: 2021-07-28 | End: 2021-07-28

## 2021-07-28 RX ORDER — SODIUM CHLORIDE 0.9 % (FLUSH) 0.9 %
5-40 SYRINGE (ML) INJECTION AS NEEDED
Status: DISCONTINUED | OUTPATIENT
Start: 2021-07-28 | End: 2021-07-30 | Stop reason: HOSPADM

## 2021-07-28 RX ORDER — PROPOFOL 10 MG/ML
INJECTION, EMULSION INTRAVENOUS
Status: DISCONTINUED | OUTPATIENT
Start: 2021-07-28 | End: 2021-07-28 | Stop reason: HOSPADM

## 2021-07-28 RX ORDER — FACIAL-BODY WIPES
10 EACH TOPICAL DAILY PRN
Status: DISCONTINUED | OUTPATIENT
Start: 2021-07-30 | End: 2021-07-30 | Stop reason: HOSPADM

## 2021-07-28 RX ORDER — BUPIVACAINE HYDROCHLORIDE 7.5 MG/ML
INJECTION, SOLUTION EPIDURAL; RETROBULBAR AS NEEDED
Status: DISCONTINUED | OUTPATIENT
Start: 2021-07-28 | End: 2021-07-28

## 2021-07-28 RX ORDER — TRAZODONE HYDROCHLORIDE 100 MG/1
50 TABLET ORAL
Status: DISCONTINUED | OUTPATIENT
Start: 2021-07-28 | End: 2021-07-30 | Stop reason: HOSPADM

## 2021-07-28 RX ORDER — DEXAMETHASONE SODIUM PHOSPHATE 4 MG/ML
INJECTION, SOLUTION INTRA-ARTICULAR; INTRALESIONAL; INTRAMUSCULAR; INTRAVENOUS; SOFT TISSUE AS NEEDED
Status: DISCONTINUED | OUTPATIENT
Start: 2021-07-28 | End: 2021-07-28 | Stop reason: HOSPADM

## 2021-07-28 RX ORDER — SODIUM CHLORIDE, SODIUM LACTATE, POTASSIUM CHLORIDE, CALCIUM CHLORIDE 600; 310; 30; 20 MG/100ML; MG/100ML; MG/100ML; MG/100ML
75 INJECTION, SOLUTION INTRAVENOUS CONTINUOUS
Status: DISCONTINUED | OUTPATIENT
Start: 2021-07-28 | End: 2021-07-28 | Stop reason: HOSPADM

## 2021-07-28 RX ORDER — ONDANSETRON 2 MG/ML
4 INJECTION INTRAMUSCULAR; INTRAVENOUS AS NEEDED
Status: DISCONTINUED | OUTPATIENT
Start: 2021-07-28 | End: 2021-07-28 | Stop reason: HOSPADM

## 2021-07-28 RX ORDER — ACETAMINOPHEN 325 MG/1
650 TABLET ORAL EVERY 6 HOURS
Status: DISCONTINUED | OUTPATIENT
Start: 2021-07-28 | End: 2021-07-30 | Stop reason: HOSPADM

## 2021-07-28 RX ORDER — ASPIRIN 325 MG
325 TABLET, DELAYED RELEASE (ENTERIC COATED) ORAL EVERY 12 HOURS
Status: DISCONTINUED | OUTPATIENT
Start: 2021-07-28 | End: 2021-07-30 | Stop reason: HOSPADM

## 2021-07-28 RX ORDER — HYDROXYZINE HYDROCHLORIDE 10 MG/1
10 TABLET, FILM COATED ORAL
Status: DISCONTINUED | OUTPATIENT
Start: 2021-07-28 | End: 2021-07-30 | Stop reason: HOSPADM

## 2021-07-28 RX ORDER — ONDANSETRON 4 MG/1
4 TABLET, ORALLY DISINTEGRATING ORAL
Status: DISCONTINUED | OUTPATIENT
Start: 2021-07-28 | End: 2021-07-30 | Stop reason: HOSPADM

## 2021-07-28 RX ORDER — METFORMIN HYDROCHLORIDE 500 MG/1
500 TABLET, EXTENDED RELEASE ORAL
Status: DISCONTINUED | OUTPATIENT
Start: 2021-07-29 | End: 2021-07-30 | Stop reason: HOSPADM

## 2021-07-28 RX ORDER — MIDAZOLAM HYDROCHLORIDE 1 MG/ML
1 INJECTION, SOLUTION INTRAMUSCULAR; INTRAVENOUS AS NEEDED
Status: DISCONTINUED | OUTPATIENT
Start: 2021-07-28 | End: 2021-07-28 | Stop reason: HOSPADM

## 2021-07-28 RX ORDER — MIDAZOLAM HYDROCHLORIDE 1 MG/ML
INJECTION, SOLUTION INTRAMUSCULAR; INTRAVENOUS AS NEEDED
Status: DISCONTINUED | OUTPATIENT
Start: 2021-07-28 | End: 2021-07-28 | Stop reason: HOSPADM

## 2021-07-28 RX ORDER — FENTANYL CITRATE 50 UG/ML
50 INJECTION, SOLUTION INTRAMUSCULAR; INTRAVENOUS AS NEEDED
Status: DISCONTINUED | OUTPATIENT
Start: 2021-07-28 | End: 2021-07-28 | Stop reason: HOSPADM

## 2021-07-28 RX ORDER — SODIUM CHLORIDE 9 MG/ML
25 INJECTION, SOLUTION INTRAVENOUS CONTINUOUS
Status: DISCONTINUED | OUTPATIENT
Start: 2021-07-28 | End: 2021-07-28 | Stop reason: HOSPADM

## 2021-07-28 RX ORDER — SODIUM CHLORIDE 9 MG/ML
125 INJECTION, SOLUTION INTRAVENOUS CONTINUOUS
Status: DISPENSED | OUTPATIENT
Start: 2021-07-28 | End: 2021-07-29

## 2021-07-28 RX ORDER — SODIUM CHLORIDE 0.9 % (FLUSH) 0.9 %
5-40 SYRINGE (ML) INJECTION EVERY 8 HOURS
Status: DISCONTINUED | OUTPATIENT
Start: 2021-07-28 | End: 2021-07-30 | Stop reason: HOSPADM

## 2021-07-28 RX ORDER — HYDROMORPHONE HYDROCHLORIDE 1 MG/ML
0.2 INJECTION, SOLUTION INTRAMUSCULAR; INTRAVENOUS; SUBCUTANEOUS
Status: DISCONTINUED | OUTPATIENT
Start: 2021-07-28 | End: 2021-07-28 | Stop reason: HOSPADM

## 2021-07-28 RX ORDER — ACETAMINOPHEN 325 MG/1
650 TABLET ORAL ONCE
Status: COMPLETED | OUTPATIENT
Start: 2021-07-28 | End: 2021-07-28

## 2021-07-28 RX ORDER — LEVOTHYROXINE SODIUM 112 UG/1
112 TABLET ORAL
Status: DISCONTINUED | OUTPATIENT
Start: 2021-07-29 | End: 2021-07-30 | Stop reason: HOSPADM

## 2021-07-28 RX ORDER — FENTANYL CITRATE 50 UG/ML
25 INJECTION, SOLUTION INTRAMUSCULAR; INTRAVENOUS
Status: DISCONTINUED | OUTPATIENT
Start: 2021-07-28 | End: 2021-07-28 | Stop reason: HOSPADM

## 2021-07-28 RX ORDER — SODIUM CHLORIDE, SODIUM LACTATE, POTASSIUM CHLORIDE, CALCIUM CHLORIDE 600; 310; 30; 20 MG/100ML; MG/100ML; MG/100ML; MG/100ML
INJECTION, SOLUTION INTRAVENOUS
Status: DISCONTINUED | OUTPATIENT
Start: 2021-07-28 | End: 2021-07-28 | Stop reason: HOSPADM

## 2021-07-28 RX ORDER — BUPIVACAINE HYDROCHLORIDE 5 MG/ML
INJECTION, SOLUTION EPIDURAL; INTRACAUDAL
Status: COMPLETED | OUTPATIENT
Start: 2021-07-28 | End: 2021-07-28

## 2021-07-28 RX ORDER — LISINOPRIL 10 MG/1
40 TABLET ORAL
Status: DISCONTINUED | OUTPATIENT
Start: 2021-07-29 | End: 2021-07-30 | Stop reason: HOSPADM

## 2021-07-28 RX ORDER — HYDROCHLOROTHIAZIDE 25 MG/1
12.5 TABLET ORAL EVERY EVENING
Status: DISCONTINUED | OUTPATIENT
Start: 2021-07-28 | End: 2021-07-30 | Stop reason: HOSPADM

## 2021-07-28 RX ORDER — ONDANSETRON 2 MG/ML
INJECTION INTRAMUSCULAR; INTRAVENOUS AS NEEDED
Status: DISCONTINUED | OUTPATIENT
Start: 2021-07-28 | End: 2021-07-28 | Stop reason: HOSPADM

## 2021-07-28 RX ADMIN — DEXAMETHASONE SODIUM PHOSPHATE 4 MG: 4 INJECTION, SOLUTION INTRAMUSCULAR; INTRAVENOUS at 07:39

## 2021-07-28 RX ADMIN — OXYCODONE HYDROCHLORIDE 5 MG: 5 TABLET ORAL at 15:49

## 2021-07-28 RX ADMIN — CELECOXIB 200 MG: 200 CAPSULE ORAL at 07:15

## 2021-07-28 RX ADMIN — ONDANSETRON HYDROCHLORIDE 4 MG: 2 INJECTION, SOLUTION INTRAMUSCULAR; INTRAVENOUS at 07:39

## 2021-07-28 RX ADMIN — PROPOFOL 33 MCG/KG/MIN: 10 INJECTION, EMULSION INTRAVENOUS at 07:33

## 2021-07-28 RX ADMIN — ROPIVACAINE HYDROCHLORIDE 30 ML: 5 INJECTION, SOLUTION EPIDURAL; INFILTRATION; PERINEURAL at 07:18

## 2021-07-28 RX ADMIN — PHENYLEPHRINE HYDROCHLORIDE 25 MCG/MIN: 10 INJECTION INTRAVENOUS at 07:33

## 2021-07-28 RX ADMIN — TRANEXAMIC ACID 1 G: 100 INJECTION, SOLUTION INTRAVENOUS at 07:56

## 2021-07-28 RX ADMIN — CLONAZEPAM 0.5 MG: 1 TABLET ORAL at 22:06

## 2021-07-28 RX ADMIN — ASPIRIN 325 MG: 325 TABLET, COATED ORAL at 12:31

## 2021-07-28 RX ADMIN — ATORVASTATIN CALCIUM 5 MG: 10 TABLET, FILM COATED ORAL at 22:00

## 2021-07-28 RX ADMIN — DOCUSATE SODIUM 50 MG AND SENNOSIDES 8.6 MG 1 TABLET: 8.6; 5 TABLET, FILM COATED ORAL at 12:31

## 2021-07-28 RX ADMIN — TRAZODONE HYDROCHLORIDE 50 MG: 100 TABLET ORAL at 22:06

## 2021-07-28 RX ADMIN — FAMOTIDINE 20 MG: 20 TABLET, FILM COATED ORAL at 22:00

## 2021-07-28 RX ADMIN — DOCUSATE SODIUM 50 MG AND SENNOSIDES 8.6 MG 1 TABLET: 8.6; 5 TABLET, FILM COATED ORAL at 18:20

## 2021-07-28 RX ADMIN — MIDAZOLAM HYDROCHLORIDE 2 MG: 1 INJECTION, SOLUTION INTRAMUSCULAR; INTRAVENOUS at 07:20

## 2021-07-28 RX ADMIN — MIDAZOLAM 1 MG: 1 INJECTION INTRAMUSCULAR; INTRAVENOUS at 07:31

## 2021-07-28 RX ADMIN — SODIUM CHLORIDE 125 ML/HR: 9 INJECTION, SOLUTION INTRAVENOUS at 10:39

## 2021-07-28 RX ADMIN — BUPIVACAINE HYDROCHLORIDE 10 MG: 5 INJECTION, SOLUTION EPIDURAL; INTRACAUDAL; PERINEURAL at 07:43

## 2021-07-28 RX ADMIN — POLYETHYLENE GLYCOL 3350 17 G: 17 POWDER, FOR SOLUTION ORAL at 12:32

## 2021-07-28 RX ADMIN — VANCOMYCIN HYDROCHLORIDE 1500 MG: 10 INJECTION, POWDER, LYOPHILIZED, FOR SOLUTION INTRAVENOUS at 07:17

## 2021-07-28 RX ADMIN — MONTELUKAST 10 MG: 10 TABLET, FILM COATED ORAL at 22:06

## 2021-07-28 RX ADMIN — ACETAMINOPHEN 650 MG: 325 TABLET ORAL at 18:20

## 2021-07-28 RX ADMIN — FENTANYL CITRATE 50 MCG: 50 INJECTION, SOLUTION INTRAMUSCULAR; INTRAVENOUS at 07:20

## 2021-07-28 RX ADMIN — CELECOXIB 200 MG: 200 CAPSULE ORAL at 22:06

## 2021-07-28 RX ADMIN — ACETAMINOPHEN 650 MG: 325 TABLET ORAL at 12:31

## 2021-07-28 RX ADMIN — ASPIRIN 325 MG: 325 TABLET, COATED ORAL at 19:06

## 2021-07-28 RX ADMIN — ACETAMINOPHEN 650 MG: 325 TABLET ORAL at 07:15

## 2021-07-28 RX ADMIN — SODIUM CHLORIDE, POTASSIUM CHLORIDE, SODIUM LACTATE AND CALCIUM CHLORIDE: 600; 310; 30; 20 INJECTION, SOLUTION INTRAVENOUS at 07:16

## 2021-07-28 NOTE — ANESTHESIA POSTPROCEDURE EVALUATION
Post-Anesthesia Evaluation and Assessment    Patient: Maile Herzog MRN: [id number]  SSN: [id number]    YOB: 1945  Age: 67 y.o. Sex: female      I have evaluated the patient and they are stable and ready for discharge from the PACU. Cardiovascular Function/Vital Signs  Visit Vitals  /66   Pulse 66   Temp 36.1 °C (96.9 °F)   Resp 21   Ht 5' 4\" (1.626 m)   Wt 91.2 kg (201 lb)   SpO2 95%   BMI 34.50 kg/m²       Patient is status post Spinal anesthesia for Procedure(s):  RIGHT TOTAL KNEE ARTHROPLASTY. Nausea/Vomiting: None    Postoperative hydration reviewed and adequate. Pain:  Pain Scale 1: Numeric (0 - 10) (07/28/21 1000)  Pain Intensity 1: 0 (07/28/21 1000)   Managed    Neurological Status:   Neuro (WDL): Within Defined Limits (07/28/21 0741)  Neuro  LUE Motor Response: Purposeful (07/28/21 1000)  LLE Motor Response: Numbness (07/28/21 1000)  RUE Motor Response: Purposeful (07/28/21 1000)  RLE Motor Response: Numbness (07/28/21 1000)   At baseline    Mental Status, Level of Consciousness: Alert and  oriented to person, place, and time    Pulmonary Status:   O2 Device: None (Room air) (07/28/21 1000)   Adequate oxygenation and airway patent    Complications related to anesthesia: None    Post-anesthesia assessment completed. No concerns    Signed By: Senthil Lilly MD     July 28, 2021              Procedure(s):  RIGHT TOTAL KNEE ARTHROPLASTY. spinal    <BSHSIANPOST>    INITIAL Post-op Vital signs:   Vitals Value Taken Time   /66 07/28/21 1010   Temp 36.1 °C (96.9 °F) 07/28/21 0951   Pulse 68 07/28/21 1013   Resp 12 07/28/21 1013   SpO2 94 % 07/28/21 1013   Vitals shown include unvalidated device data.

## 2021-07-28 NOTE — ANESTHESIA PROCEDURE NOTES
Peripheral Block    Start time: 7/28/2021 7:16 AM  End time: 7/28/2021 7:20 AM  Performed by: Ryan Whatley MD  Authorized by: Ryan Whatley MD       Pre-procedure: Indications: at surgeon's request and post-op pain management    Preanesthetic Checklist: patient identified, risks and benefits discussed, site marked, timeout performed and patient being monitored    Timeout Time: 07:16 EDT          Block Type:   Block Type:   Adductor canal  Laterality:  Left  Monitoring:  Standard ASA monitoring, continuous pulse ox, frequent vital sign checks, heart rate, responsive to questions and oxygen  Injection Technique:  Single shot  Procedures: ultrasound guided    Patient Position: supine  Prep: DuraPrep    Needle Type:  Stimuplex  Needle Gauge:  22 G  Needle Localization:  Ultrasound guidance  Medication Injected:  Ropivacaine (PF) (NAROPIN)(0.5%) 5 mg/mL injection, 30 mL  Med Admin Time: 7/28/2021 7:18 AM    Assessment:  Number of attempts:  1  Injection Assessment:  Incremental injection every 5 mL, local visualized surrounding nerve on ultrasound, negative aspiration for blood, no paresthesia, no intravascular symptoms and negative aspiration for CSF  Patient tolerance:  Patient tolerated the procedure well with no immediate complications

## 2021-07-28 NOTE — PROGRESS NOTES
Primary Nurse Bonnie Mercado and Shonda Colin RN performed a dual skin assessment on this patient No impairment noted  Jose score is 20

## 2021-07-28 NOTE — ANESTHESIA PROCEDURE NOTES
Spinal Block    Start time: 7/28/2021 7:38 AM  End time: 7/28/2021 7:44 AM  Performed by: Tom Adames CRNA  Authorized by: Marzena Sanders MD     Pre-procedure:   Indications: at surgeon's request, post-op pain management, procedure for pain and primary anesthetic  Preanesthetic Checklist: patient identified, risks and benefits discussed, anesthesia consent, site marked, patient being monitored and timeout performed    Timeout Time: 07:37 EDT          Spinal Block:   Patient Position:  Seated  Prep Region:  Lumbar  Prep: DuraPrep      Location:  L3-4  Technique:  Single shot        Needle:   Needle Type:  Pencan  Needle Gauge:  22 G  Attempts:  1      Events: CSF confirmed, no blood with aspiration and no paresthesia        Assessment:  Insertion:  Uncomplicated  Patient tolerance:  Patient tolerated the procedure well with no immediate complications

## 2021-07-28 NOTE — PROGRESS NOTES
After Visit Summary   9/19/2017    Luc Hebert    MRN: [id number]           Patient Information     Date Of Birth          1955        Visit Information        Provider Department      9/19/2017 7:30 AM Alfredito Lopez MD [facility]        Today's Diagnoses     Pseudophakia - Left Eye    -  1       Follow-ups after your visit        Your next 10 appointments already scheduled     Sep 25, 2017   Procedure with Alfredito Lopez MD   [facility] ([facility])    [address] [place] [address]  [address] [facility] [address]   [phone]           Located in the Clinics and Surgery Center at [address] [place] [address].   parking is very convenient and highly recommended.  is a $6 flat rate fee.  Both  and self parkers should enter the main arrival plaza from [facility]; parking attendants will direct you based on your parking preference.            Sep 25, 2017 12:00 PM CDT   (Arrive by 11:45 AM)   Post-Op with Alfredito Lopez MD   [facility] Ophthalmology ([facility])    [address] [place] [address]  [address] [facility] [address]   [phone]            Oct 10, 2017  7:30 AM CDT   Post-Op with Alfredito Lopez MD   [facility] Clinic ([facility])    Russ Torres [address]  [address]  [facility] [address]   [phone]            Dec 13, 2017  8:00 AM CST   (Arrive by 7:45 AM)   New Neuropathy with José Miguel Villatoro MD   [facility] Neurology ([facility])    [address] [place] [address]  [address] [facility] [address]   [phone]            Dec 14, 2017 10:00 AM CST   (Arrive by 9:45 AM)   Return Visit with Gatito Richard MD   [facility] Neurology ([facility])    [address] [place] [address]  [address] [facility] [address]   [phone]              Future tests that were ordered for you today      Family Update provided to the pt's son Valeria [facility] - [phone]). Confirmed that Marshall Curry w/ him Post-Op. He is on his way to work and will return in [address] [facility] Open Future Orders        Priority Expected Expires Ordered    PHYSICAL THERAPY REFERRAL Routine 9/18/2017 9/18/2018 9/18/2017            Who to contact     Please call your clinic at [phone] to:    Ask questions about your health    Make or cancel appointments    Discuss your medicines    Learn about your test results    Speak to your doctor   If you have compliments or concerns about an experience at your clinic, or if you wish to file a complaint, please contact [facility] Patient Relations at [phone] or email us at [email or web address]         Additional Information About Your Visit        [employer] Information     [employer] gives you secure access to your electronic health record. If you see a primary care provider, you can also send messages to your care team and make appointments. If you have questions, please call your primary care clinic.  If you do not have a primary care provider, please call [phone] and they will assist you.      [employer] is an electronic gateway that provides easy, online access to your medical records. With [employer], you can request a clinic appointment, read your test results, renew a prescription or communicate with your care team.     To access your existing account, please contact your [facility] or call [phone] for assistance.        Care EveryWhere ID     This is your Care EveryWhere ID. This could be used by other organizations to access your [facility] medical records  [id number]         Blood Pressure from Last 3 Encounters:   08/28/17 142/86   08/24/17 152/88   05/30/17 145/84    Weight from Last 3 Encounters:   08/24/17 100.3 kg (221 lb 1.6 oz)   08/01/17 96.2 kg (212 lb)   05/30/17 99.3 kg (219 lb)              Today, you had the following     No orders found for display       Primary Care Provider Office Phone # Fax #    Rommel Haile [phone]       [address]  [facility] [address]  [facility] [address]        Goals        General    Psychosocial (pt-stated)     Notes - Note created  6/18/2014  1:25 PM by Roxanna Rios MSW    As of today's date 6/18/2014 goal is met at 0 - 25%.   Goal Status:  Active    Pt will identify an activity or volunteer opportunity to sign up for.    [address], [facility]  Care Coordination   [phone]            Equal Access to Services     [facility]: Hadii aad ku hadasho Soomaali, waaxda luqadaha, qaybta kaalmada adeegyada, waxay idiin hayaan adeeg khsean la'aan . So [facility] [phone].    ATENCIÓN: Si habla español, tiene a mobley disposición servicios gratuitos de asistencia lingüística. Llame al [phone].    We comply with applicable federal civil rights laws and [place] laws. We do not discriminate on the basis of race, color, national origin, age, disability sex, sexual orientation or gender identity.            Thank you!     Thank you for choosing [facility]  for your care. Our goal is always to provide you with excellent care. Hearing back from our patients is one way we can continue to improve our services. Please take a few minutes to complete the written survey that you may receive in the mail after your visit with us. Thank you!             Your Updated Medication List - Protect others around you: Learn how to safely use, store and throw away your medicines at www.disposemymeds.org.          This list is accurate as of: 9/19/17  8:29 AM.  Always use your most recent med list.                   Brand Name Dispense Instructions for use Diagnosis    cyclobenzaprine 5 MG tablet    FLEXERIL    90 tablet    Take 1 tablet (5 mg) by mouth See Admin Instructions Take one twice daily for 10 days, then one po tid.    Tension headache       escitalopram 20 MG tablet    LEXAPRO    30 tablet    TAKE 1 TABLET (20 MG) BY MOUTH DAILY    Depression, major, in remission (H)       FLUVIRIN Susp   Generic drug:  Influenza Vac Typ A&B Surf Ant       VACCINATION ADMINISTERED BY PHARMACIST        lisinopril 40 MG tablet    PRINIVIL/ZESTRIL    90 tablet    TAKE 1 TABLET (40 MG) BY MOUTH DAILY    Essential hypertension with goal blood pressure less than 140/90       LYRICA 75 MG capsule   Generic drug:  pregabalin      daily        moxifloxacin 0.5 % ophthalmic solution    VIGAMOX    1 Bottle    Starting the night prior to surgery, use 1 drop at 8pm, 9pm, and 10pm.    Senile nuclear sclerosis, bilateral       prednisoLONE acetate 1 % ophthalmic susp    PRED FORTE    1 Bottle    Four times a day , taper as directed    Senile nuclear sclerosis, bilateral       TYLENOL PO      Take by mouth as needed for mild pain or fever        verapamil 120 MG 24 hr capsule    VERELAN    90 capsule    TAKE 1 CAPSULE (120 MG) BY MOUTH DAILY    Essential hypertension with goal blood pressure less than 140/90

## 2021-07-28 NOTE — PROGRESS NOTES
Problem: Mobility Impaired (Adult and Pediatric)  Goal: *Acute Goals and Plan of Care (Insert Text)  Description: FUNCTIONAL STATUS PRIOR TO ADMISSION: Patient was independent and active without use of DME.    HOME SUPPORT PRIOR TO ADMISSION: The patient lived with son but did not require assist.    Physical Therapy Goals  Initiated 7/28/2021    1. Patient will move from supine to sit and sit to supine , scoot up and down, and roll side to side in bed with modified independence within 4 days. 2. Patient will perform sit to stand with modified independence within 4 days. 3. Patient will ambulate with modified independence for 150 feet with the least restrictive device within 4 days. 4. Patient will ascend/descend 4 stairs with cane and one handrail(s) with modified independence within 4 days. 5. Patient will perform home exercise program per protocol with independence within 4 days. 6. Patient will demonstrate AROM 0-90 degrees in operative joint within 4 days. Outcome: Progressing Towards Goal   PHYSICAL THERAPY EVALUATION  Patient: Rishi Sellers (85 y.o. female)  Date: 7/28/2021  Primary Diagnosis: Primary localized osteoarthritis of right knee [M17.11]  Procedure(s) (LRB):  RIGHT TOTAL KNEE ARTHROPLASTY (Right) Day of Surgery   Precautions:   Fall, WBAT    ASSESSMENT  Based on the objective data described below, the patient presents with  impairment in functional mobility, activity tolerance and balance s/p R TKA. Patient had L TKA 10/2019. PLOF: Independent with ADLs and IADLs. Patient lives with son in a two story home, 13 steps to bed & bath with right rail, 1 step with rail to enter. Patient plans to stay on the first floor initially and has a bed and toilet downstairs (no bath or shower). Son works from [employer], but will be taking a week off after her surgery. Patient's mobility was on target for POD#0.  Will address more exercises, increase gait distance, negotiate stairs and assess for discharge at am PT session tomorrow. Patient instructed NOT to get up from bed, chair or commode without calling for assistance. Initiated post TKA exercise protocol and wrote same on [employer]. Anticipate possible discharge tomorrow after am PT session. Current Level of Function Impacting Discharge (mobility/balance): Minimal assistance for bed mobility (to manage RLE), contact guard for transfers and gait. Ambulated 72 ft with RW and gait belt, gait antalgic but steady. Functional Outcome Measure: The patient scored 55/100 on the Barthel outcome measure which is indicative of moderate impaired ability to care for basic self-needs/dependency on others. .      Other factors to consider for discharge: Motivated/A & O x 4/Supportive Family/Independent PLOF      Patient will benefit from skilled therapy intervention to address the above noted impairments. PLAN :  Recommendations and Planned Interventions: bed mobility training, transfer training, gait training, and therapeutic exercises      Frequency/Duration: Patient will be followed by physical therapy:  twice daily to address goals. Recommendation for discharge: (in order for the patient to meet his/her long term goals)  Physical therapy at least 2 days/week in the home     This discharge recommendation:  Has been made in collaboration with the attending provider and/or case management    IF patient discharges home will need the following DME: patient owns DME required for discharge         SUBJECTIVE:   Patient stated I am ready to get up I would like to go home tomorrow. Emi Pandya    OBJECTIVE DATA SUMMARY:   HISTORY:    Past Medical History:   Diagnosis Date    Arthritis     knes and back , foot osteoarthritis    Asthma     Depression with anxiety     [facility]     Gout     History of pelvic fracture     Hypertension     Hypothyroidism     Menopause     LMP-45years old?     Morbid obesity [facility]     Seizures ([facility] [address] [facility] had a seizure as a result of medication about 50 years ago    Vitamin D deficiency 5/11/2010     Past Surgical History:   Procedure Laterality Date    ENDOSCOPY, COLON, DIAGNOSTIC  9/13/2010    -due again 9/2015    HX BREAST AUGMENTATION Left     removal of implant    HX BUNIONECTOMY Left 1992    HX CHOLECYSTECTOMY      HX GI  06/2019    COLONOSCOPY    HX HERNIA REPAIR      x 2    HX TONSILLECTOMY      IMPLANT BREAST SILICONE/EQ Bilateral 1725    Lt implant was removed due to leakage.  ID BREAST SURGERY PROCEDURE UNLISTED      implants for deformity, and removed       Personal factors and/or comorbidities impacting plan of care:  Motivated/A & O x 4/Supportive Family/Independent PLOF     Home Situation  Home Environment: Private residence  # Steps to Enter: 1  Rails to Enter: Yes  Hand Rails : Right  Wheelchair Ramp: No  One/Two Story Residence: Two story (can stay on 1st floor, bed & toilet available)  # of Interior Steps: 13  Interior Rails: Right  Lift Chair Available: No  Living Alone: No  Support Systems: Child(adama) (Son works 10a-8p daily)  Patient Expects to be Discharged toF Cor[de-identified]ration  Current DME Used/Available at Home: Cane, straight, Walker, rolling, Raised toilet seat  Tub or Shower Type: Tub/Shower combination (plans to get a transfer tub bench)    EXAMINATION/PRESENTATION/DECISION MAKING:   Critical Behavior:  Neurologic State: Alert  Orientation Level: Oriented X4  Cognition: Follows commands     Range Of Motion:  AROM: Generally decreased, functional           PROM: Generally decreased, functional           Strength:    Strength: Generally decreased, functional                    Tone & Sensation:   Tone: Normal              Sensation: Intact               Coordination:  Coordination: Within functional limits  Vision:      Functional Mobility:  Bed Mobility:     Supine to Sit: Minimum assistance (to manage RLE)  Sit to Supine: Minimum assistance (to manage RLE)  Scooting: Minimum assistance (to manage RLE)  Transfers:  Sit to Stand: Contact guard assistance  Stand to Sit: Contact guard assistance                       Balance:   Sitting: Intact  Standing: Impaired; Without support  Standing - Static: Good;Constant support  Standing - Dynamic : Good;Constant support  Ambulation/Gait Training:  Distance (ft): 65 Feet (ft)  Assistive Device: Walker, rolling;Gait belt  Ambulation - Level of Assistance: Contact guard assistance        Gait Abnormalities: Antalgic  Right Side Weight Bearing: As tolerated     Base of Support: Widened;Shift to left  Stance: Right decreased  Speed/Lesa: Slow  Step Length: Left shortened          Therapeutic Exercises: Ankle Pumps  Ham Sets  Quad Sets  Heel Slides  X 10 each every hour     Functional Measure:  Barthel Index:    Bathin  Bladder: 10  Bowels: 10  Groomin  Dressin  Feeding: 10  Mobility: 0  Stairs: 0  Toilet Use: 5  Transfer (Bed to Chair and Back): 10  Total: 55/100       The Barthel ADL Index: Guidelines  1. The index should be used as a record of what a patient does, not as a record of what a patient could do. 2. The main aim is to establish degree of independence from any help, physical or verbal, however minor and for whatever reason. 3. The need for supervision renders the patient not independent. 4. A patient's performance should be established using the best available evidence. Asking the patient, friends/relatives and nurses are the usual sources, but direct observation and common sense are also important. However direct testing is not needed. 5. Usually the patient's performance over the preceding 24-48 hours is important, but occasionally longer periods will be relevant. 6. Middle categories imply that the patient supplies over 50 per cent of the effort. 7. Use of aids to be independent is allowed. Virgen Mandujano., Barthel, D.W. (3250). Functional evaluation: the Barthel Index. [address] [facility] (14)2.   Mgay Elena abisai ElenaPerry County Memorial Hospital, NETO SIMMONS. Antwan Shearer., Patria Houser., Ree Child (1999). Measuring the change indisability after inpatient rehabilitation; comparison of the responsiveness of the Barthel Index and Functional [place] Measure. Journal of Neurology, Neurosurgery, and Psychiatry, 66(4), 905-550. Matthews [place], [place].A, ROSANNE Navarro, & Edgardo Ernst M.A. (2004.) Assessment of post-stroke quality of life in cost-effectiveness studies: The usefulness of the Barthel Index and the EuroQoL-5D. Quality of Life Research, 15, 440-37          Physical Therapy Evaluation Charge Determination   History Examination Presentation Decision-Making   LOW Complexity : Zero comorbidities / personal factors that will impact the outcome / POC LOW Complexity : 1-2 Standardized tests and measures addressing body structure, function, activity limitation and / or participation in recreation  LOW Complexity : Stable, uncomplicated  LOW Complexity : FOTO score of       Based on the above components, the patient evaluation is determined to be of the following complexity level: LOW     Pain Ratin/10    Activity Tolerance:   Good   Vitals:    21 1127 21 1228 21 1340 21 1346   BP: 108/73 118/60 126/72 (!) 157/67   BP 1 Location: Left upper arm Left upper arm Left upper arm Left upper arm   BP Patient Position: At rest At rest Supine Sitting   Pulse: 68 66 71 74   Temp: 97.8 °F (36.6 °C) 97.8 °F (36.6 °C) 98.1 °F (36.7 °C)    Resp: 15 17     Height:       Weight:       SpO2: 90% 95% 93%        After treatment patient left in no apparent distress:   Supine in bed, Call bell within reach, Side rails x 3, and nurse notified. COMMUNICATION/EDUCATION:   The patients plan of care was discussed with: Registered nurse and Case management. Fall prevention education was provided and the patient/caregiver indicated understanding., Patient/family have participated as able in goal setting and plan of care. , and Patient/family agree to work toward stated goals and plan of care.     Thank you for this referral.  Amanda Ramos   Time Calculation: 30 mins

## 2021-07-28 NOTE — PERIOP NOTES
TRANSFER - OUT REPORT:    Verbal report given to [facility] (name) on Gabriella Ashraf  being transferred to [facility] 57 [phone] 09 (unit) for routine post - op       Report consisted of patients Situation, Background, Assessment and   Recommendations(SBAR). Time Pre op antibiotic given:0717  Anesthesia Stop time: 1359  Yee Present on Transfer to floor:N/A  Order for Yee on Chart:N/A  Discharge Prescriptions with Chart:N/A    Information from the following report(s) SBAR, OR Summary, Procedure Summary, Intake/Output and MAR was reviewed with the receiving nurse. Opportunity for questions and clarification was provided. Is the patient on 02? NO       L/Min RA       Other N/A    Is the patient on a monitor? NO    Is the nurse transporting with the patient? NO    Surgical Waiting Area notified of patient's transfer from PACU? NO (called his son on his cell-phone)      The following personal items collected during your admission accompanied patient upon transfer:   Dental Appliance: Dental Appliances: None  Vision:    Hearing Aid:    Jewelry: Jewelry: None  Clothing: to myra; 578  Other Valuables:  Other Valuables: None  Valuables sent to safe:

## 2021-07-28 NOTE — BRIEF OP NOTE
Brief Postoperative Note    Patient: Kamille Price  YOB: 1945  MRN: [id number]    Date of Procedure: 7/28/2021     Pre-Op Diagnosis: DJD RIGHT KNEE    Post-Op Diagnosis: Same as preoperative diagnosis. Procedure(s):  RIGHT TOTAL KNEE ARTHROPLASTY    Surgeon(s):  Antonio Mendoza MD    Surgical Assistant: Physician Assistant: Stephanie Garcia PA-C  Surg Asst-1: Can Briones    Anesthesia: Spinal     Estimated Blood Loss (mL): 878     Complications: None    Specimens: * No specimens in log *     Implants:   Implant Name Type Inv.  Item Serial No.  Lot No. LRB No. Used Action   CEMENT BNE 40GM FULL DOSE PMMA W/ GENT HI VISC RADPQ LNG - SNA  CEMENT BNE 40GM FULL DOSE PMMA W/ GENT HI VISC RADPQ LNG NA [facility] DEPUY [email or web address] OR[employer]_ [id number] Right 2 Implanted   COMPONENT FEM SZ 4 R KNEE POST STBL DEANA ATTUNE - SNA  COMPONENT FEM SZ 4 R KNEE POST STBL DEANA ATTUNE NA [facility] DEPUY [email or web address] OR[employer]_ [id number] Right 1 Implanted   COMPONENT PAT [id number] KNEE POLY DOME DEANA MEDIALIZED ATTUNE - SNA  COMPONENT PAT [id number] KNEE POLY DOME DEANA MEDIALIZED ATTUNE NA [employer] DEPUY [employer]_ [id number] Right 1 Implanted   BASEPLATE TIB SZ 4 FIX BEAR DEANA S+ TECHNOLOGY ATTUNE - SNA  BASEPLATE TIB SZ 4 FIX BEAR DEANA S+ TECHNOLOGY ATTUNE NA [employer] DEPUY [email or web address]TH[employer]ED[employer]_ [id number] Right 1 Implanted   INSERT TIB SZ 4 THK6MM KNEE POST STBL FIX BEAR ATTUNE - SNA  INSERT TIB SZ 4 THK6MM KNEE POST STBL FIX BEAR ATTUNE NA [employer] DEPUY [email or web address] ORTH[employer]_ [id number] Right 1 Implanted       Drains: * No LDAs found *    Findings: DJD Right knee    Electronically Signed by Judi Lloyd PA-C on 7/28/2021 at 9:44 AM

## 2021-07-28 NOTE — PROGRESS NOTES
TRANSFER - IN REPORT:    Verbal report received from Rosy RN (name) on Sonda Ditch  being received from PACU (unit) for routine post - op      Report consisted of patients Situation, Background, Assessment and   Recommendations(SBAR). Information from the following report(s) SBAR, OR Summary, Procedure Summary, MAR and Recent Results was reviewed with the receiving nurse. Opportunity for questions and clarification was provided. Assessment completed upon patients arrival to unit and care assumed.

## 2021-07-28 NOTE — OP NOTES
[address]   OPERATIVE REPORT    Name:  Garo Lazo  MR#:  [id number]  :  1945  ACCOUNT #:  [de-identified]  DATE OF SERVICE:  2021      PREOPERATIVE DIAGNOSES:  1. Advanced degenerative arthritis, right knee. 2.  Status post left total knee replacement. POSTOPERATIVE DIAGNOSES:  1. Advanced degenerative arthritis, right knee. 2.  Status post left total knee replacement. PROCEDURE PERFORMED:  Right total knee replacement. SURGEON:  Senthil Villarreal MD    ASSISTANT:  Cristina Smith PA-C    ANESTHESIA:  Regional block plus spinal.    COMPLICATIONS:  None. SPECIMENS REMOVED:  Tibial and femoral bone fragments. IMPLANTS:  Right total knee components as listed in operative report. ESTIMATED BLOOD LOSS:  200 mL. DRAINS:  None. INDICATIONS:  The patient has previously undergone left total knee replacement. She now has severe pain in the right knee and presents for right total knee replacement. PROCEDURE:  On the day of operation, the patient was taken to the holding area. Regional block administered. The patient was taken to the operating room. Spinal anesthesia administered. She was placed in supine position. Consent confirmed. Antibiotics were given. Right lower extremity was prepped and draped in the usual fashion. After exsanguination with an Esmarch, tourniquet inflated to 290 mmHg. A midline longitudinal incision was made over the knee. It was carried through the subcutaneous tissue. A medial parapatellar capsular incision made. Advanced degenerative changes were noted throughout the knee. The knee was debrided of osteophytes and soft tissue. A drill was used to gain access to the femoral canal.  Distal femoral cutting guide assembled with a 5-degree valgus cut. Distal femoral cut made with an oscillating saw. The femur was sized and felt to be #4 in the Attune system. Anterior and posterior cuts were made.   Chamfer cuts were made. Box cut for the posterior stabilized prosthesis made. External tibial alignment guide assembled. Tibial plateau cut made with an oscillating saw. Flexion and extension gaps were evaluated and felt to be appropriate. The tibia was sized and felt to be a #4 in the Attune system. Drill and punch were used to prepare the tibial plateau. The patella was prepared with an oscillating saw and sized for 35 mm. Trial components were put into place. 6 mm insert gave the best fit, range of motion, with proper alignment and proper tracking of the patella. The ligaments were felt to be properly balanced. The trial components were then removed. The knee was thoroughly irrigated with pulse irrigation as well as antibiotic solution. The components were cemented into place with antibiotic impregnated cement. The 6 mm trial insert was put into place. Exparel local anesthetic was used to infiltrate the soft tissues. After the cement matured, the 6 mm insert was put into place and felt to be stable. Tourniquet released. Coagulation achieved with electrocautery. Fascia closed with a combination of #2 and #1 Vicryl, supplemented with #2 PDS. 2-0 Vicryl used for subcutaneous tissue. Staples used to close the skin. Sterile dressings applied. The patient taken to recovery room in satisfactory condition. The assistant, Kt Leyva PA-C, assisted me with positioning, retraction, implant installation, and incision closure.       Gus Arreguin MD      [id number]  D:  07/28/2021 14:01  T:  07/28/2021 18:27  JOB #:  [id number]  CC:  Mandi Ramos MD

## 2021-07-29 LAB
ANION GAP SERPL CALC-SCNC: 5 MMOL/L (ref 5–15)
BUN SERPL-MCNC: 14 MG/DL (ref 6–20)
BUN/CREAT SERPL: 18 (ref 12–20)
CALCIUM SERPL-MCNC: 8.6 MG/DL (ref 8.5–10.1)
CHLORIDE SERPL-SCNC: 108 MMOL/L (ref 97–108)
CO2 SERPL-SCNC: 26 MMOL/L (ref 21–32)
CREAT SERPL-MCNC: 0.79 MG/DL (ref 0.55–1.02)
GLUCOSE BLD STRIP.AUTO-MCNC: 160 MG/DL (ref 65–117)
GLUCOSE SERPL-MCNC: 165 MG/DL (ref 65–100)
HGB BLD-MCNC: 11.1 G/DL (ref 11.5–16)
INR PPP: 1.1 (ref 0.9–1.1)
POTASSIUM SERPL-SCNC: 4.4 MMOL/L (ref 3.5–5.1)
PROTHROMBIN TIME: 11 SEC (ref 9–11.1)
SERVICE CMNT-IMP: ABNORMAL
SODIUM SERPL-SCNC: 139 MMOL/L (ref 136–145)

## 2021-07-29 PROCEDURE — 36415 COLL VENOUS BLD VENIPUNCTURE: CPT

## 2021-07-29 PROCEDURE — 82962 GLUCOSE BLOOD TEST: CPT

## 2021-07-29 PROCEDURE — 97535 SELF CARE MNGMENT TRAINING: CPT

## 2021-07-29 PROCEDURE — 97116 GAIT TRAINING THERAPY: CPT

## 2021-07-29 PROCEDURE — 80048 BASIC METABOLIC PNL TOTAL CA: CPT

## 2021-07-29 PROCEDURE — 97165 OT EVAL LOW COMPLEX 30 MIN: CPT

## 2021-07-29 PROCEDURE — 74011250637 HC RX REV CODE- 250/637: Performed by: PHYSICIAN ASSISTANT

## 2021-07-29 PROCEDURE — 85610 PROTHROMBIN TIME: CPT

## 2021-07-29 PROCEDURE — 97530 THERAPEUTIC ACTIVITIES: CPT

## 2021-07-29 PROCEDURE — 85018 HEMOGLOBIN: CPT

## 2021-07-29 RX ORDER — ASPIRIN 325 MG
325 TABLET ORAL 2 TIMES DAILY
Qty: 60 TABLET | Refills: 0 | Status: SHIPPED | OUTPATIENT
Start: 2021-07-29 | End: 2021-08-28

## 2021-07-29 RX ORDER — OXYCODONE HYDROCHLORIDE 5 MG/1
5 TABLET ORAL
Qty: 40 TABLET | Refills: 0 | Status: SHIPPED | OUTPATIENT
Start: 2021-07-29 | End: 2021-08-05

## 2021-07-29 RX ADMIN — ACETAMINOPHEN 650 MG: 325 TABLET ORAL at 07:13

## 2021-07-29 RX ADMIN — Medication 10 ML: at 07:15

## 2021-07-29 RX ADMIN — ASPIRIN 325 MG: 325 TABLET, COATED ORAL at 07:13

## 2021-07-29 RX ADMIN — ACETAMINOPHEN 650 MG: 325 TABLET ORAL at 01:22

## 2021-07-29 RX ADMIN — ATORVASTATIN CALCIUM 5 MG: 10 TABLET, FILM COATED ORAL at 21:14

## 2021-07-29 RX ADMIN — TRAZODONE HYDROCHLORIDE 50 MG: 100 TABLET ORAL at 21:14

## 2021-07-29 RX ADMIN — POLYETHYLENE GLYCOL 3350 17 G: 17 POWDER, FOR SOLUTION ORAL at 08:06

## 2021-07-29 RX ADMIN — LEVOTHYROXINE SODIUM 112 MCG: 0.11 TABLET ORAL at 07:13

## 2021-07-29 RX ADMIN — DOCUSATE SODIUM 50 MG AND SENNOSIDES 8.6 MG 1 TABLET: 8.6; 5 TABLET, FILM COATED ORAL at 18:16

## 2021-07-29 RX ADMIN — ACETAMINOPHEN 650 MG: 325 TABLET ORAL at 12:09

## 2021-07-29 RX ADMIN — ASPIRIN 325 MG: 325 TABLET, COATED ORAL at 18:16

## 2021-07-29 RX ADMIN — CELECOXIB 200 MG: 200 CAPSULE ORAL at 08:05

## 2021-07-29 RX ADMIN — FAMOTIDINE 20 MG: 20 TABLET ORAL at 21:13

## 2021-07-29 RX ADMIN — CLONAZEPAM 0.5 MG: 1 TABLET ORAL at 12:09

## 2021-07-29 RX ADMIN — ESCITALOPRAM OXALATE 20 MG: 10 TABLET ORAL at 18:16

## 2021-07-29 RX ADMIN — CELECOXIB 200 MG: 200 CAPSULE ORAL at 21:13

## 2021-07-29 RX ADMIN — HYDROCHLOROTHIAZIDE 12.5 MG: 25 TABLET ORAL at 18:16

## 2021-07-29 RX ADMIN — CLONAZEPAM 0.5 MG: 1 TABLET ORAL at 21:13

## 2021-07-29 RX ADMIN — MONTELUKAST 10 MG: 10 TABLET, FILM COATED ORAL at 21:13

## 2021-07-29 RX ADMIN — ACETAMINOPHEN 650 MG: 325 TABLET ORAL at 18:16

## 2021-07-29 RX ADMIN — METFORMIN HYDROCHLORIDE 500 MG: 500 TABLET, EXTENDED RELEASE ORAL at 18:16

## 2021-07-29 NOTE — PROGRESS NOTES
TOTAL KNEE PROGRESS NOTE      Subjective:     Post-Operative Day: 1 Status Post right Total Knee Arthroplasty  Systemic or Specific Complaints:No Complaints    Objective:     Patient Vitals for the past 24 hrs:   BP Temp Pulse Resp SpO2   07/29/21 0803 132/69 97.7 °F (36.5 °C) (!) 54 17 95 %   07/29/21 0230 104/68 97.8 °F (36.6 °C) (!) 56 16 95 %   07/28/21 2037 115/72 98.4 °F (36.9 °C) 61 16 94 %   07/28/21 1821 123/73  78     07/28/21 1441 135/70 97.9 °F (36.6 °C) 74 14 94 %   07/28/21 1346 (!) 157/67  74     07/28/21 1340 126/72 98.1 °F (36.7 °C) 71  93 %   07/28/21 1228 118/60 97.8 °F (36.6 °C) 66 17 95 %   07/28/21 1127 108/73 97.8 °F (36.6 °C) 68 15 90 %   07/28/21 1100 112/67  66 12 93 %   07/28/21 1055   66 11 95 %   07/28/21 1045 119/73  63 15 93 %   07/28/21 1030 113/63  66 13 93 %       General: alert, cooperative, no distress, appears stated age   Wound: Wound clean and dry no evidence of infection. , No Erythema, No Edema, No Drainage and Wound Intact   Motion: Extension: Full Extension   DVT Exam: No evidence of DVT seen on physical exam.  Negative Bradley's sign. No cords or calf tenderness. No significant calf/ankle edema.      Data Review:    Recent Results (from the past 24 hour(s))   METABOLIC PANEL, BASIC    Collection Time: 07/29/21  1:27 AM   Result Value Ref Range    Sodium 139 136 - 145 mmol/L    Potassium 4.4 3.5 - 5.1 mmol/L    Chloride 108 97 - 108 mmol/L    CO2 26 21 - 32 mmol/L    Anion gap 5 5 - 15 mmol/L    Glucose 165 (H) 65 - 100 mg/dL    BUN 14 6 - 20 MG/DL    Creatinine 0.79 0.55 - 1.02 MG/DL    BUN/Creatinine ratio 18 12 - 20      GFR est AA >60 >60 ml/min/1.73m2    GFR est non-AA >60 >60 ml/min/1.73m2    Calcium 8.6 8.5 - 10.1 MG/DL   HEMOGLOBIN    Collection Time: 07/29/21  1:27 AM   Result Value Ref Range    HGB 11.1 (L) 11.5 - 16.0 g/dL   PROTHROMBIN TIME + INR    Collection Time: 07/29/21  1:27 AM   Result Value Ref Range    INR 1.1 0.9 - 1.1      Prothrombin time 11.0 9.0 - 11.1 sec   GLUCOSE, POC    Collection Time: 07/29/21  6:31 AM   Result Value Ref Range    Glucose (POC) 160 (H) 65 - 117 mg/dL    Performed by Susan Conway          Assessment:     Status Post right Total Knee Arthroplasty. Doing well postoperatively. . Bandage post-op ACE. Labs stable. PT progressing well. Pain control WNL. \"Had a bit of anxiety, doing better now. I want to go home today\"     Plan:     Continues current post-op course  Continue PT per protocol  Plan to [facility] to [facility]. today vs. Tomorrow.

## 2021-07-29 NOTE — PROGRESS NOTES
Bedside shift change report given to Sandi Silverio RN (oncoming nurse) by Nilsa Pinzon RN (offgoing nurse). Report included the following information SBAR, Kardex, Output.

## 2021-07-29 NOTE — PROGRESS NOTES
Spiritual Care Partner Volunteer visited patient in Rm 578 on 7/29/21. Documented by:   Chaplain Martinez MDiv, MACE  [phone] [phone])

## 2021-07-29 NOTE — PROGRESS NOTES
Problem: Mobility Impaired (Adult and Pediatric)  Goal: *Acute Goals and Plan of Care (Insert Text)  Description: FUNCTIONAL STATUS PRIOR TO ADMISSION: Patient was independent and active without use of DME.    HOME SUPPORT PRIOR TO ADMISSION: The patient lived with son but did not require assist.    Physical Therapy Goals  Initiated 7/28/2021    1. Patient will move from supine to sit and sit to supine , scoot up and down, and roll side to side in bed with modified independence within 4 days. 2. Patient will perform sit to stand with modified independence within 4 days. 3. Patient will ambulate with modified independence for 150 feet with the least restrictive device within 4 days. 4. Patient will ascend/descend 4 stairs with cane and one handrail(s) with modified independence within 4 days. 5. Patient will perform home exercise program per protocol with independence within 4 days. 6. Patient will demonstrate AROM 0-90 degrees in operative joint within 4 days. 7/29/2021 1433 by Margi Diaz  Outcome: Progressing Towards Goal   PHYSICAL THERAPY AFTERNOON SESSION NOTE  Patient: Sushil Blackwood (35 y.o. female)  Date: 7/29/2021  Primary Diagnosis: Primary localized osteoarthritis of right knee [M17.11]  Procedure(s) (LRB):  RIGHT TOTAL KNEE ARTHROPLASTY (Right) 1 Day Post-Op   Precautions:   Fall, WBAT    Sushil Blackwood was seen for afternoon PT session. She only walked 45 feet this afternoon (refused to go further) at contact guard level of assistance and with a RW and gait belt. She deferred stairs until tomorrow, citing \"panic attacks and pain\" . The primary limiting factors are anxiety, pain. Currently, expect Sushil Blackwood will need 1-2 more session(s) to meet the therapy goals in preparation for returning home.     Afternoon session functional mobility:  Bed Mobility:     Supine to Sit: Contact guard assistance  Sit to Supine: Minimum assistance (to manage RLE)  Scooting: Supervision  Transfers:  Sit to Stand: Contact guard assistance  Stand to Sit: Contact guard assistance        Bed to Chair: Contact guard assistance              Balance:   Sitting: Intact  Standing: Impaired; Without support  Standing - Static: Good;Constant support  Standing - Dynamic : Good;Constant support  Ambulation/Gait Training:  Distance (ft): 45 Feet (ft)  Assistive Device: Walker, rolling;Gait belt  Ambulation - Level of Assistance: Contact guard assistance        Gait Abnormalities: Antalgic  Right Side Weight Bearing: As tolerated     Base of Support: Widened;Shift to left  Stance: Right decreased  Speed/Lesa: Slow  Step Length: Left shortened  Swing Pattern: Right asymmetrical     Interventions: Safety awareness training;Verbal cues                   Thank you for this referral.  Rox Pierce   Time Calculation: 20 mins

## 2021-07-29 NOTE — PROGRESS NOTES
Problem: Self Care Deficits Care Plan (Adult)  Goal: *Acute Goals and Plan of Care (Insert Text)  Description: Occupational Therapy Goals  Initiated: 7/29/2021   1. Patient will perform grooming with supervision/set-up standing at sink within 3 day(s). 2.  Patient will perform bathing with supervision/set-up from chair within 3 day(s). 3.  Patient will perform upper body dressing and lower body dressing with supervision/set-up within 3 day(s). 4.  Patient will perform toilet transfers with supervision/set-up within 3 day(s). 5.  Patient will perform all aspects of toileting with supervision/set-up within 3 day(s). FUNCTIONAL STATUS PRIOR TO ADMISSION: Pt reports she is independent at home prior to surgery. She reports recent admission to ED due to gout and inability to walk. She reports she is minimally active at home due to pain and fatigue. She reports significant weight gain in the last few months. HOME SUPPORT: Her son will be helping at discharge but he does continue to work. 7/29/2021 1145 by Rashard Baptiste OT  Outcome: Progressing Towards Goal     OCCUPATIONAL THERAPY EVALUATION  Patient: Satya Quiroga (89 y.o. female)  Date: 7/29/2021  Primary Diagnosis: Primary localized osteoarthritis of right knee [M17.11]  Procedure(s) (LRB):  RIGHT TOTAL KNEE ARTHROPLASTY (Right) 1 Day Post-Op   Precautions:   Fall, WBAT    ASSESSMENT  Based on the objective data described below, the patient presents with decreased independence with all self care and functional mobility following admission for elective R TKR. Pt received in bed and requesting toileting. Nursing staff unavailable to therapist entered to provide support and assistance. Pt assisted to [facility] with Cg and cues for safety and for hand placement due to pain and tearfulness with activities. She reports great amounts of pain with WB and ROM. She was unable to progress to the bathroom due to pain.   Pt reports she will have support from her son at discharge but the son continues to work full time. Pt may require 24/7 support at discharge pending continued progress. Current Level of Function Impacting Discharge (ADLs/self-care): mod A for basic ADL activities with CG for tranfers. Functional Outcome Measure: The patient scored 55 on the Barthel Index outcome measure which is indicative of 45% impairment with ADL activities. Other factors to consider for discharge: debility, pain     Patient will benefit from skilled therapy intervention to address the above noted impairments. PLAN :  Recommendations and Planned Interventions: self care training, functional mobility training, therapeutic exercise, balance training, therapeutic activities, endurance activities, patient education, home safety training, and family training/education    Frequency/Duration: Patient will be followed by occupational therapy 5 times a week to address goals. Recommendation for discharge: (in order for the patient to meet his/her long term goals)  No skilled occupational therapy/ follow up rehabilitation needs identified at this time. This discharge recommendation:  Has been made in collaboration with the attending provider and/or case management    IF patient discharges home will need the following DME: bedside commode       SUBJECTIVE:   Patient stated I am alright.     OBJECTIVE DATA SUMMARY:   HISTORY:   Past Medical History:   Diagnosis Date    Arthritis     knes and back , foot osteoarthritis    Asthma     Depression with anxiety     Diabetes [facility]     Gout     History of pelvic fracture     Hypertension     Hypothyroidism     Menopause     LMP-45years old?     Morbid obesity ([facility]     Seizures ([facility] [address] [facility]     had a seizure as a result of medication about 50 years ago    Vitamin D deficiency 5/11/2010     Past Surgical History:   Procedure Laterality Date    ENDOSCOPY, COLON, DIAGNOSTIC  9/13/2010    -due again 9/2015    HX BREAST AUGMENTATION Left     removal of implant    HX BUNIONECTOMY Left 1992    HX CHOLECYSTECTOMY      HX GI  06/2019    COLONOSCOPY    HX HERNIA REPAIR      x 2    HX TONSILLECTOMY      IMPLANT BREAST SILICONE/EQ Bilateral 6384    Lt implant was removed due to leakage.  MI BREAST SURGERY PROCEDURE UNLISTED      implants for deformity, and removed       Expanded or extensive additional review of patient history:     Home Situation  Home Environment: Private residence  # Steps to Enter: 1  Rails to Enter: Yes  Hand Rails : Right  Wheelchair Ramp: No  One/Two Story Residence: Two story, live on 1st floor  # of Interior Steps: 13  Interior Rails: Right  Lift Chair Available: No  Living Alone: No  Support Systems: Child(adama)  Patient Expects to be Discharged to[de-identified] [employer]  Current DME Used/Available at Home: Heena Cruz, straight, Walker, rolling  Tub or Shower Type: Tub/Shower combination    Hand dominance: Right    EXAMINATION OF PERFORMANCE DEFICITS:  Cognitive/Behavioral Status:  Neurologic State: Alert  Orientation Level: Oriented X4  Cognition: Follows commands  Perception: Appears intact  Perseveration: No perseveration noted  Safety/Judgement: Good awareness of safety precautions    Skin: see nursing notes, ace wrap in place over knee    Edema: none noted    Hearing: Auditory  Auditory Impairment: None    Vision/Perceptual:                           Acuity: Within Defined Limits         Range of Motion:    AROM: Within functional limits  PROM: Within functional limits                      Strength:    Strength: Within functional limits                Coordination:  Coordination: Within functional limits  Fine Motor Skills-Upper: Right Intact; Left Intact    Gross Motor Skills-Upper: Right Intact; Left Intact    Tone & Sensation:    Tone: Normal  Sensation: Intact                      Balance:  Sitting: Intact  Standing: Impaired; With support  Standing - Static: Constant support; [address]  Standing - Dynamic : Constant support; Fair    Functional Mobility and Transfers for ADLs:  Bed Mobility:  Supine to Sit: Additional time;Minimum assistance  Sit to Supine: Additional time; Moderate assistance  Scooting: Supervision    Transfers:  Sit to Stand: Contact guard assistance  Stand to Sit: Contact guard assistance  Bed to Chair: Contact guard assistance  Bathroom Mobility: Contact guard assistance (BSC only 2' pain)  Toilet Transfer : Contact guard assistance    ADL Assessment:  Feeding: Independent    Oral Facial Hygiene/Grooming: Setup    Bathing: Maximum assistance    Upper Body Dressing: Setup    Lower Body Dressing: Maximum assistance    Toileting: Moderate assistance                ADL Intervention and task modifications:   Pt was able to progress OOB to the [facility] with cues and education for hand placement. She is very slow to progress to the [facility] due to pain and anxiety. She requires cues to redirect to task at hand. Pt insistent she is to have a bowel movement but unsuccessful. Pt returned to bed, repositioned supine in bed with ice to knee. Cognitive Retraining  Safety/Judgement: Good awareness of safety precautions    Functional Measure:  Barthel Index:    Bathin  Bladder: 10  Bowels: 10  Groomin  Dressin  Feeding: 10  Mobility: 0  Stairs: 0  Toilet Use: 5  Transfer (Bed to Chair and Back): 10  Total: 55/100        The Barthel ADL Index: Guidelines  1. The index should be used as a record of what a patient does, not as a record of what a patient could do. 2. The main aim is to establish degree of independence from any help, physical or verbal, however minor and for whatever reason. 3. The need for supervision renders the patient not independent. 4. A patient's performance should be established using the best available evidence. Asking the patient, friends/relatives and nurses are the usual sources, but direct observation and common sense are also important. However direct testing is not needed. 5. Usually the patient's performance over the preceding 24-48 hours is important, but occasionally longer periods will be relevant. 6. Middle categories imply that the patient supplies over 50 per cent of the effort. 7. Use of aids to be independent is allowed. Camila Hedrick., Barthel, D.W. ([id number]). Functional evaluation: the Barthel Index. [address] [facility] (14)2. Nazia Weems abisai MASTER Mueller, Shashi Winkler., Sherman Marcelo., Александр Hodges, [address] (1999). Measuring the change indisability after inpatient rehabilitation; comparison of the responsiveness of the Barthel Index and Functional [place] Measure. Journal of Neurology, Neurosurgery, and Psychiatry, 66(4), 724-874. Amandeep Arreola N.J.A, ROSANNE Navarro, & Demarcus Zhang MMohitA. (2004.) Assessment of post-stroke quality of life in cost-effectiveness studies: The usefulness of the Barthel Index and the EuroQoL-5D. Quality of Life Research, 15, 584-68         Occupational Therapy Evaluation Charge Determination   History Examination Decision-Making   LOW Complexity : Brief history review  LOW Complexity : 1-3 performance deficits relating to physical, cognitive , or psychosocial skils that result in activity limitations and / or participation restrictions  LOW Complexity : No comorbidities that affect functional and no verbal or physical assistance needed to complete eval tasks       Based on the above components, the patient evaluation is determined to be of the following complexity level: LOW   Pain Rating:  10/10 pain in her right knee    Activity Tolerance:   Fair    After treatment patient left in no apparent distress:    Supine in bed, Call bell within reach, and Caregiver / family present    COMMUNICATION/EDUCATION:   The patients plan of care was discussed with: Physical therapist and Physician. Home safety education was provided and the patient/caregiver indicated understanding.  and Patient/family have participated as able in goal setting and plan of care. This patients plan of care is appropriate for delegation to MARYLOU.     Thank you for this referral.  Kilo Pisano OT  Time Calculation: 30 mins

## 2021-07-29 NOTE — PROGRESS NOTES
Problem: Mobility Impaired (Adult and Pediatric)  Goal: *Acute Goals and Plan of Care (Insert Text)  Description: FUNCTIONAL STATUS PRIOR TO ADMISSION: Patient was independent and active without use of DME.    HOME SUPPORT PRIOR TO ADMISSION: The patient lived with son but did not require assist.    Physical Therapy Goals  Initiated 7/28/2021    1. Patient will move from supine to sit and sit to supine , scoot up and down, and roll side to side in bed with modified independence within 4 days. 2. Patient will perform sit to stand with modified independence within 4 days. 3. Patient will ambulate with modified independence for 150 feet with the least restrictive device within 4 days. 4. Patient will ascend/descend 4 stairs with cane and one handrail(s) with modified independence within 4 days. 5. Patient will perform home exercise program per protocol with independence within 4 days. 6. Patient will demonstrate AROM 0-90 degrees in operative joint within 4 days. Outcome: Progressing Towards Goal   PHYSICAL THERAPY TREATMENT  Patient: Geraldine Balderas (48 y.o. female)  Date: 7/29/2021  Diagnosis: Primary localized osteoarthritis of right knee [M17.11] Primary localized osteoarthritis of right knee  Procedure(s) (LRB):  RIGHT TOTAL KNEE ARTHROPLASTY (Right) 1 Day Post-Op  Precautions: Fall, WBAT  Chart, physical therapy assessment, plan of care and goals were reviewed. ASSESSMENT  Patient continues with skilled PT services and is progressing towards goals. Patient with complaints of pain and anxiety, but agreed to participate in therapy. Gave patient much encouragement & assured her that she is making progress. Felt better after ace removed. Performed bed mobility, transfers and gait 65 ft with RW and gait belt. Left up in recliner after session with call bell in place, LEs elevated, tray in place. Will see again this pm for gait distance, step negotiation.  Anticipate discharge after am PT visit tomorrow. Current Level of Function Impacting Discharge (mobility/balance): Contact Guard assistance, use of strap for bed mobility; contact guard assistance for transfers and gait. Gait antalgic, but steady. Other factors to consider for discharge: Motivated/A & O x 4/Supportive Family/Independent PLOF          PLAN :  Patient continues to benefit from skilled intervention to address the above impairments. Continue treatment per established plan of care. to address goals. Recommendation for discharge: (in order for the patient to meet his/her long term goals)  Physical therapy at least 2 days/week in the home     This discharge recommendation:  Has been made in collaboration with the attending provider and/or case management    IF patient discharges home will need the following DME: patient owns DME required for discharge       SUBJECTIVE:   Patient stated I am so disappointed in myself.     OBJECTIVE DATA SUMMARY:   Critical Behavior:  Neurologic State: Alert  Orientation Level: Oriented X4  Cognition: Follows commands  Safety/Judgement: Good awareness of safety precautions  Functional Mobility Training:  Bed Mobility:     Supine to Sit: Contact guard assistance; Additional time; Adaptive equipment (use of strap)  Sit to Supine:  (Left up in chair)  Scooting: Supervision        Transfers:  Sit to Stand: Contact guard assistance  Stand to Sit: Contact guard assistance        Bed to Chair: Contact guard assistance                    Balance:  Sitting: Intact  Standing: Impaired; With support  Standing - Static: Good;Constant support  Standing - Dynamic : Good;Constant support  Ambulation/Gait Training:  Distance (ft): 65 Feet (ft)  Assistive Device: Walker, rolling;Gait belt  Ambulation - Level of Assistance: Contact guard assistance        Gait Abnormalities: Antalgic  Right Side Weight Bearing: As tolerated     Base of Support: Widened;Shift to left  Stance: Right decreased  Speed/Lesa: Slow  Step Length: Left shortened  Swing Pattern: Right asymmetrical     Interventions: Safety awareness training;Verbal cues             Therapeutic Exercises: Ankle Pumps  Ham Sets  Quad Sets  Heel Slides  X 10 each every hour     Pain Ratin/10    Activity Tolerance:   Good    After treatment patient left in no apparent distress:   Sitting in chair, Call bell within reach, and nurse notified. COMMUNICATION/COLLABORATION:   The patients plan of care was discussed with: Occupational therapist, Registered nurse, and Case management.      Kevin Bliss   Time Calculation: 30 mins

## 2021-07-29 NOTE — PROGRESS NOTES
RIGOBERTO: The patient plans to discharge home with [facility] and son, Rodrigo Simmons ([phone]) will transport when stable for discharge. RUR: N/A    1. The patient is from home and her son lives with her. 2. Orthopedics, PT/OT following. 3. The patient plans to discharge home with home health and son to transport. Observation notice provided in writing to patient and/or caregiver as well as verbal explanation of the policy. Patients who are in outpatient status also receive the Observation notice. Patient has received notice and or patient representative has received via secure email, fax, or certified mail based on patient representative's preference. Care Management Interventions  PCP Verified by CM: Yes  Palliative Care Criteria Met (RRAT>21 & CHF Dx)?: No  Mode of Transport at Discharge: Other (see comment)  Transition of Care Consult (CM Consult): [address]: No  Reason Outside [place]: Physician referred to specific agency  MyChart Signup: No  Discharge Durable Medical Equipment: No  Health Maintenance Reviewed: Yes  Physical Therapy Consult: Yes  Occupational Therapy Consult: Yes  Speech Therapy Consult: No  Current Support Network: Other  Confirm Follow Up Transport: Family  The Plan for Transition of Care is Related to the Following Treatment Goals : The patient plans to discharge home with home health and family to transport.    The Patient and/or Patient Representative was Provided with a Choice of Provider and Agrees with the Discharge Plan?: Yes  Freedom of Choice List was Provided with Basic Dialogue that Supports the Patient's Individualized Plan of Care/Goals, Treatment Preferences and Shares the Quality Data Associated with the Providers?: Yes   Resource Information Provided?: No  Discharge Location  Discharge Placement: Home with home health     Reason for Admission:  Right Total Knee                     RUR Score:  N/A Plan for utilizing home health: The Plan for Transition of Care is related to the following treatment goals: Home Health  The Patient and/or patient representative Antonia Romo was provided with a choice of provider and agrees   with the discharge plan. [x] Yes [] No    Freedom of choice list was provided with basic dialogue that supports the patient's individualized plan of care/goals, treatment preferences and shares the quality data associated with the providers. [x] Yes [] No     PCP: First and Last name:  Geoff Dalal MD, phone: [phone]     Name of Practice:    Are you a current patient: Yes/No: Yes   Approximate date of last visit: July, 2021   Can you participate in a virtual visit with your PCP: Yes                  Current Advanced Directive/Advance Care Plan: Full Code      Healthcare Decision Maker:   Click here to complete [address] including selection of the Healthcare Decision Maker Relationship (ie \"Primary\")                             Transition of Care Plan:          CM met with the patient in room 578. The patient is alert and oriented x4. Confirmed demographics. The patient is independent with ADL's/IADL's, owns her own walker, drives a vehicle and uses [address] and [facility] for long term meds. The patient lives with her son, Leatha Muse and plans to discharge home with his assistance and he will transport. The patient's son has taken off work to stay with his mom to assist with her recovery. CM following for discharge needs.     Lux Gruber RN/CRM

## 2021-07-30 ENCOUNTER — HOME HEALTH ADMISSION (OUTPATIENT)
Dept: HOME HEALTH SERVICES | Facility: HOME HEALTH | Age: 76
End: 2021-07-30
Payer: MEDICARE

## 2021-07-30 VITALS
DIASTOLIC BLOOD PRESSURE: 65 MMHG | HEART RATE: 67 BPM | WEIGHT: 201 LBS | SYSTOLIC BLOOD PRESSURE: 149 MMHG | RESPIRATION RATE: 16 BRPM | OXYGEN SATURATION: 94 % | TEMPERATURE: 98 F | HEIGHT: 64 IN | BODY MASS INDEX: 34.31 KG/M2

## 2021-07-30 PROCEDURE — 74011250637 HC RX REV CODE- 250/637: Performed by: PHYSICIAN ASSISTANT

## 2021-07-30 PROCEDURE — 97116 GAIT TRAINING THERAPY: CPT

## 2021-07-30 PROCEDURE — 97535 SELF CARE MNGMENT TRAINING: CPT

## 2021-07-30 PROCEDURE — 97530 THERAPEUTIC ACTIVITIES: CPT

## 2021-07-30 RX ADMIN — ACETAMINOPHEN 650 MG: 325 TABLET ORAL at 01:54

## 2021-07-30 RX ADMIN — LEVOTHYROXINE SODIUM 112 MCG: 0.11 TABLET ORAL at 07:07

## 2021-07-30 RX ADMIN — CELECOXIB 200 MG: 200 CAPSULE ORAL at 09:19

## 2021-07-30 RX ADMIN — ACETAMINOPHEN 650 MG: 325 TABLET ORAL at 12:28

## 2021-07-30 RX ADMIN — LISINOPRIL 40 MG: 10 TABLET ORAL at 07:08

## 2021-07-30 RX ADMIN — ASPIRIN 325 MG: 325 TABLET, COATED ORAL at 07:07

## 2021-07-30 RX ADMIN — ACETAMINOPHEN 650 MG: 325 TABLET ORAL at 07:07

## 2021-07-30 NOTE — PROGRESS NOTES
Problem: Mobility Impaired (Adult and Pediatric)  Goal: *Acute Goals and Plan of Care (Insert Text)  Description: FUNCTIONAL STATUS PRIOR TO ADMISSION: Patient was independent and active without use of DME.    HOME SUPPORT PRIOR TO ADMISSION: The patient lived with son but did not require assist.    Physical Therapy Goals  Initiated 7/28/2021    1. Patient will move from supine to sit and sit to supine , scoot up and down, and roll side to side in bed with modified independence within 4 days. 2. Patient will perform sit to stand with modified independence within 4 days. 3. Patient will ambulate with modified independence for 150 feet with the least restrictive device within 4 days. 4. Patient will ascend/descend 4 stairs with cane and one handrail(s) with modified independence within 4 days. 5. Patient will perform home exercise program per protocol with independence within 4 days. 6. Patient will demonstrate AROM 0-90 degrees in operative joint within 4 days. Outcome: Progressing Towards Goal   PHYSICAL THERAPY TREATMENT  Patient: Trent Pearce (73 y.o. female)  Date: 7/30/2021  Diagnosis: Primary localized osteoarthritis of right knee [M17.11] Primary localized osteoarthritis of right knee  Procedure(s) (LRB):  RIGHT TOTAL KNEE ARTHROPLASTY (Right) 2 Days Post-Op  Precautions: Fall, WBAT  Chart, physical therapy assessment, plan of care and goals were reviewed. ASSESSMENT  Patient continues with skilled PT services and is progressing towards goals. Pt able to progress to stairs without difficulty - safe with use of rail and cane. Pt plans to sleep on first floor initially. Pt cleared for discharge from PT standpoint. Current Level of Function Impacting Discharge (mobility/balance): Mod Indep with RW; CGA for stairs with rail/cane    Other factors to consider for discharge:          PLAN :  Patient continues to benefit from skilled intervention to address the above impairments.   Continue treatment per established plan of care. to address goals. Recommendation for discharge: (in order for the patient to meet his/her long term goals)  Physical therapy at least 2 days/week in the home     This discharge recommendation:  Has been made in collaboration with the attending provider and/or case management    IF patient discharges home will need the following DME: patient owns DME required for discharge       SUBJECTIVE:   Patient stated I hope I can leave by noon.     OBJECTIVE DATA SUMMARY:   Critical Behavior:  Neurologic State: Alert  Orientation Level: Oriented X4  Cognition: Appropriate for age attention/concentration  Safety/Judgement: Good awareness of safety precautions  Functional Mobility Training:  Bed Mobility:   Deferred - pt up in bedside chair                 Transfers:  Sit to Stand: Modified independent  Stand to Sit: Modified independent        Bed to Chair: Modified independent                    Balance:  Sitting: Intact  Standing: Intact; With support  Standing - Static: Good;Constant support  Standing - Dynamic : Good;Constant support  Ambulation/Gait Training:  Distance (ft): 75 Feet (ft)  Assistive Device: Walker, rolling;Gait belt  Ambulation - Level of Assistance: Modified independent        Gait Abnormalities: Antalgic  Right Side Weight Bearing: As tolerated     Base of Support: Center of gravity altered;Shift to left  Stance: Right decreased  Speed/Lesa: Slow  Step Length: Left shortened  Swing Pattern: Right asymmetrical     Interventions: Safety awareness training        Stairs:  Number of Stairs Trained: 4  Stairs - Level of Assistance: Stand-by assistance   Rail Use: Right  (cane left )    Therapeutic Exercises:   Pt able to verbalize understanding of importance of performing seated and supine TKR exercise - pt is s/p previous left TKA.     Pain Rating:  Right knee 4/10    Activity Tolerance:   Good - ready for discharge    After treatment patient left in no apparent distress:   Sitting in chair and Call bell within reach    COMMUNICATION/COLLABORATION:   The patients plan of care was discussed with: Registered nurse.      Analisa Vargas, PT   Time Calculation: 25 mins

## 2021-07-30 NOTE — PROGRESS NOTES
TOTAL KNEE PROGRESS NOTE      Subjective:     Post-Operative Day: 2 Status Post right Total Knee Arthroplasty  Systemic or Specific Complaints:No Complaints    Objective:     Patient Vitals for the past 24 hrs:   BP Temp Pulse Resp SpO2   07/30/21 0708 (!) 152/74 97.7 °F (36.5 °C) 68 16 98 %   07/30/21 0358 132/66 98 °F (36.7 °C) 67 16 93 %   07/29/21 2015 (!) 146/74 98.1 °F (36.7 °C) 62 16 97 %   07/29/21 1344 138/73 98.4 °F (36.9 °C) 62 16 99 %       General: alert, cooperative, no distress, appears stated age   Wound: Wound clean and dry no evidence of infection. , No Erythema, No Edema, No Drainage and Wound Intact   Motion: Extension: Full Extension   DVT Exam: No evidence of DVT seen on physical exam.  Negative Bradley's sign. No cords or calf tenderness. No significant calf/ankle edema. Data Review:  No results found for this or any previous visit (from the past 24 hour(s)). Assessment:     Status Post right Total Knee Arthroplasty. Doing well postoperatively. . Bandage aquacel appropriate. Labs stable. PT progressing well. Pain control better today. Plan:     Continues current post-op course  Continue PT per protocol  Plan to discharge to Home [facility]. today.

## 2021-07-30 NOTE — PROGRESS NOTES
I have reviewed discharge instructions with the patient. The patient verbalized understanding. Pt provided with discharge instructions and information sheet. No further questions at this time.

## 2021-07-30 NOTE — PROGRESS NOTES
Bedside and Verbal shift change report given to Caro Chavez RN (oncoming nurse) by Nitin Love RN (offgoing nurse). Report included the following information SBAR, OR Summary, Procedure Summary, MAR and Recent Results.

## 2021-07-30 NOTE — PROGRESS NOTES
Problem: Self Care Deficits Care Plan (Adult)  Goal: *Acute Goals and Plan of Care (Insert Text)  Description: Occupational Therapy Goals  Initiated: 7/29/2021   1. Patient will perform grooming with supervision/set-up standing at sink within 3 day(s). 2.  Patient will perform bathing with supervision/set-up from chair within 3 day(s). 3.  Patient will perform upper body dressing and lower body dressing with supervision/set-up within 3 day(s). 4.  Patient will perform toilet transfers with supervision/set-up within 3 day(s). 5.  Patient will perform all aspects of toileting with supervision/set-up within 3 day(s). FUNCTIONAL STATUS PRIOR TO ADMISSION: Pt reports she is independent at home prior to surgery. She reports recent admission to ED due to gout and inability to walk. She reports she is minimally active at home due to pain and fatigue. She reports significant weight gain in the last few months. HOME SUPPORT: Her son will be helping at discharge but he does continue to work. Outcome: Progressing Towards Goal   OCCUPATIONAL THERAPY TREATMENT  Patient: Leonor Uribe (75 y.o. female)  Date: 7/30/2021  Diagnosis: Primary localized osteoarthritis of right knee [M17.11] Primary localized osteoarthritis of right knee  Procedure(s) (LRB):  RIGHT TOTAL KNEE ARTHROPLASTY (Right) 2 Days Post-Op  Precautions: Fall, WBAT  Chart, occupational therapy assessment, plan of care, and goals were reviewed. ASSESSMENT  Patient continues with skilled OT services and is progressing towards goals. Received sitting in bedside chair, NAD. Completed LE dressing with CGA and education on modified techniques to reduce potential pain levels. Patient verbalized understanding and demo'd good carryover throughout session. Assisted with organizing items for discharge and adjusted home RW to correct height. Left in bedside chair, with all needs in reach and in NAD.  Will continue to benefit from OT services and progress as able. Current Level of Function Impacting Discharge (ADLs): CGA ADL/mobility    Other factors to consider for discharge: will be saying with son upon DC         PLAN :  Patient continues to benefit from skilled intervention to address the above impairments. Continue treatment per established plan of care to address goals. Recommend with staff: OOB 3x daily for meals, functional mobility to bathroom      Recommend next OT session: POC    Recommendation for discharge: (in order for the patient to meet his/her long term goals)  No skilled occupational therapy/ follow up rehabilitation needs identified at this time. This discharge recommendation:  A follow-up discussion with the attending provider and/or case management is planned    IF patient discharges home will need the following DME: patient owns DME required for discharge       SUBJECTIVE:   Patient stated i'm ready to leave.     OBJECTIVE DATA SUMMARY:   Cognitive/Behavioral Status:  Neurologic State: Alert  Orientation Level: Oriented X4  Cognition: Appropriate for age attention/concentration             Functional Mobility and Transfers for ADLs:  Bed Mobility:       Transfers:  Sit to Stand: Contact guard assistance  Functional Transfers  Bathroom Mobility: Contact guard assistance       Balance:  Sitting: Intact  Standing: Impaired; Without support  Standing - Static: Good;Constant support  Standing - Dynamic : Good;Constant support    ADL Intervention:                           Lower Body Dressing Assistance  Dressing Assistance: Contact guard assistance  Socks: Contact guard assistance  Shoes with Elastic Laces: Contact guard assistance  Leg Crossed Method Used: No  Position Performed: Seated in chair  Cues: Verbal cues provided                Pain:  None reported    Activity Tolerance:   Good and tolerates ADLs without rest breaks    After treatment patient left in no apparent distress:   Sitting in chair, Call bell within reach, and Caregiver / family present    COMMUNICATION/COLLABORATION:   The patients plan of care was discussed with: Registered nurse.      Carmenza Moore OT  Time Calculation: 23 mins

## 2021-07-30 NOTE — PROGRESS NOTES
Bedside and Verbal shift change report given to Vianney Ragland RN (oncoming nurse) by Kala Steen RN (offgoing nurse). Report included the following information SBAR.

## 2021-07-31 ENCOUNTER — HOME CARE VISIT (OUTPATIENT)
Dept: SCHEDULING | Facility: HOME HEALTH | Age: 76
End: 2021-07-31
Payer: MEDICARE

## 2021-07-31 PROCEDURE — G0151 HHCP-SERV OF PT,EA 15 MIN: HCPCS

## 2021-07-31 PROCEDURE — 3331090001 HH PPS REVENUE CREDIT

## 2021-07-31 PROCEDURE — 3331090002 HH PPS REVENUE DEBIT

## 2021-07-31 PROCEDURE — 400018 HH-NO PAY CLAIM PROCEDURE

## 2021-07-31 PROCEDURE — 400013 HH SOC

## 2021-08-01 VITALS
DIASTOLIC BLOOD PRESSURE: 72 MMHG | HEART RATE: 72 BPM | SYSTOLIC BLOOD PRESSURE: 110 MMHG | RESPIRATION RATE: 18 BRPM | TEMPERATURE: 97.6 F | OXYGEN SATURATION: 97 %

## 2021-08-01 PROCEDURE — 3331090002 HH PPS REVENUE DEBIT

## 2021-08-01 PROCEDURE — 3331090001 HH PPS REVENUE CREDIT

## 2021-08-02 ENCOUNTER — HOME CARE VISIT (OUTPATIENT)
Dept: SCHEDULING | Facility: HOME HEALTH | Age: 76
End: 2021-08-02
Payer: MEDICARE

## 2021-08-02 VITALS
RESPIRATION RATE: 16 BRPM | DIASTOLIC BLOOD PRESSURE: 84 MMHG | TEMPERATURE: 98.5 F | OXYGEN SATURATION: 96 % | HEART RATE: 71 BPM | SYSTOLIC BLOOD PRESSURE: 138 MMHG

## 2021-08-02 PROCEDURE — 3331090001 HH PPS REVENUE CREDIT

## 2021-08-02 PROCEDURE — G0151 HHCP-SERV OF PT,EA 15 MIN: HCPCS

## 2021-08-02 PROCEDURE — 3331090002 HH PPS REVENUE DEBIT

## 2021-08-02 NOTE — DISCHARGE SUMMARY
Discharge Summary    Physician: Julia Morse MD    Physician Assistant: Hesham Sprague PA-C    Admit Diagnosis:  Primary localized osteoarthritis of right knee [M17.11]    Final Diagnosis:   1. Advanced degenerative arthritis of right knee . Procedure: Right total knee replacement    Complications: None. History of Present Illness: The patient has a long-standing history of pain within the right knee . The patient has severe degenerative arthritis of the right knee and has exhausted conservative therapy at this time including prior intra-articular injections, activity modifications, OTC NSAIDS. The patient has been limited in their ability to perform ADLs, walk short distances or climb steps appropriately. The patient presents for the above-mentioned procedure. The patient has been cleared from a medical and cardiac standpoint. Past Medical History:  Recorded in the chart. Physical Examination: Also recorded in the chart. The patient is noted for ambulating with an antalgic gait favoring the right side. Examination of the right knee reveals 1-112 degrees. Skin intact. The cruciate and collateral ligaments are stable. No effusion. No sign of infection. No ecchymosis or erythema. No cellulitis or rash. No calf pain, swelling, or other evidence of DVT. I detect no obvious motor or sensory deficits in the lower extremities. The extensor mechanism is intact. The neurovascular status is intact . X-rays reveal advanced degenerative changes in the patellofemoral with a lateral shift to the patella. 80% medial joint space narrowing. Bone fragment in the lateral aspect. No fractures or evidence of metastatic disease. Course in the Hospital:  The patient was admitted to the hospital.  The Pt. Underwent a right total knee replacement . Postoperatively, the pt. did well. The pt. Remained stable from a medical standpoint.   The patient ambulated, WBAT weightbearing within the hospital with Physical Therapy. The patient continued with this therapy at [facility] with PT. The patient began taking  mg BID post-operatively for DVT prophylaxis and will continue on this for 30 days. At the time of discharge, there was no evidence of any DVT noted. The patient had negative Homans signs bilateral lower extremities. At the time of discharge, the patient's right knee incision appeared with staples intact. No signs of infection or inflammation noted. The patient will follow up in our office in 2-1/2 weeks. DC med list:  Discharge Medication List as of 7/30/2021 12:22 PM      START taking these medications    Details   oxyCODONE IR (ROXICODONE) 5 mg immediate release tablet Take 1 Tablet by mouth every four (4) hours as needed for Pain for up to 7 days. Max Daily Amount: 30 mg., Normal, Disp-40 Tablet, R-0      aspirin (ASPIRIN) 325 mg tablet Take 1 Tablet by mouth two (2) times a day for 30 days. , Normal, Disp-60 Tablet, R-0         CONTINUE these medications which have NOT CHANGED    Details   !! clonazePAM (KlonoPIN) 0.5 mg tablet Take 0.5 mg by mouth nightly., Historical Med      !! clonazePAM (KlonoPIN) 0.5 mg tablet Take 0.5 mg by mouth daily as needed for Anxiety. , Historical Med      famotidine (Pepcid) 20 mg tablet Take 20 mg by mouth nightly., Historical Med      ibuprofen (MOTRIN) 400 mg tablet Take 1 Tablet by mouth three (3) times daily as needed for Pain., Normal, Disp-20 Tablet, R-0      traZODone (DESYREL) 50 mg tablet Take 50 mg by mouth nightly., Historical Med      atorvastatin (LIPITOR) 10 mg tablet Take 10 mg by mouth nightly., Historical Med      montelukast (SINGULAIR) 10 mg tablet Take 10 mg by mouth nightly., Historical Med      metFORMIN (GLUMETZA ER) 500 mg TG24 24 hour tablet Take 1 Tab by mouth every evening., Historical Med      escitalopram oxalate (LEXAPRO) 20 mg tablet Take 20 mg by mouth every evening., Historical Med      lisinopril (PRINIVIL, ZESTRIL) 40 mg tablet Take 40 mg by mouth every morning., Historical Med      hydroCHLOROthiazide (HYDRODIURIL) 12.5 mg tablet Take 12.5 mg by mouth every evening., Historical Med      levothyroxine (SYNTHROID) 112 mcg tablet Take 112 mcg by mouth Daily (before breakfast). , Historical Med      Cholecalciferol, Vitamin D3, (VITAMIN D3) 1,000 unit Cap Take 2,000 Units by mouth every evening., Historical Med       !! - Potential duplicate medications found. Please discuss with provider.       STOP taking these medications       mupirocin (BACTROBAN) 2 % ointment Comments:   Reason for Stopping:         aspirin 81 mg chewable tablet Comments:   Reason for Stopping:               Patient Disposition: Home  Followup Care:  Discharge Condition: good  PT/OT per Home Health  Regular Diet  As directed    Follow-up Information     Follow up With Specialties Details Why Contact Info    Jessica Corado MD Family Medicine   [address] [facility] [address] [address] [place]  [facility] [address] [facility]                  Millicent Sepulveda PA-C

## 2021-08-03 ENCOUNTER — HOME CARE VISIT (OUTPATIENT)
Dept: HOME HEALTH SERVICES | Facility: HOME HEALTH | Age: 76
End: 2021-08-03
Payer: MEDICARE

## 2021-08-03 PROCEDURE — 3331090001 HH PPS REVENUE CREDIT

## 2021-08-03 PROCEDURE — 3331090002 HH PPS REVENUE DEBIT

## 2021-08-04 ENCOUNTER — HOME CARE VISIT (OUTPATIENT)
Dept: SCHEDULING | Facility: HOME HEALTH | Age: 76
End: 2021-08-04
Payer: MEDICARE

## 2021-08-04 VITALS
HEART RATE: 71 BPM | SYSTOLIC BLOOD PRESSURE: 128 MMHG | OXYGEN SATURATION: 94 % | TEMPERATURE: 98.2 F | RESPIRATION RATE: 16 BRPM | DIASTOLIC BLOOD PRESSURE: 74 MMHG

## 2021-08-04 PROCEDURE — G0151 HHCP-SERV OF PT,EA 15 MIN: HCPCS

## 2021-08-04 PROCEDURE — 3331090002 HH PPS REVENUE DEBIT

## 2021-08-04 PROCEDURE — 3331090001 HH PPS REVENUE CREDIT

## 2021-08-05 PROCEDURE — 3331090001 HH PPS REVENUE CREDIT

## 2021-08-05 PROCEDURE — 3331090002 HH PPS REVENUE DEBIT

## 2021-08-06 PROCEDURE — 3331090001 HH PPS REVENUE CREDIT

## 2021-08-06 PROCEDURE — 3331090002 HH PPS REVENUE DEBIT

## 2021-08-07 PROCEDURE — 3331090001 HH PPS REVENUE CREDIT

## 2021-08-07 PROCEDURE — 3331090002 HH PPS REVENUE DEBIT

## 2021-08-08 PROCEDURE — 3331090002 HH PPS REVENUE DEBIT

## 2021-08-08 PROCEDURE — 3331090001 HH PPS REVENUE CREDIT

## 2021-08-09 ENCOUNTER — HOME CARE VISIT (OUTPATIENT)
Dept: SCHEDULING | Facility: HOME HEALTH | Age: 76
End: 2021-08-09
Payer: MEDICARE

## 2021-08-09 VITALS
TEMPERATURE: 98.4 F | RESPIRATION RATE: 16 BRPM | HEART RATE: 63 BPM | OXYGEN SATURATION: 97 % | DIASTOLIC BLOOD PRESSURE: 80 MMHG | SYSTOLIC BLOOD PRESSURE: 128 MMHG

## 2021-08-09 PROCEDURE — G0151 HHCP-SERV OF PT,EA 15 MIN: HCPCS

## 2021-08-09 PROCEDURE — 3331090001 HH PPS REVENUE CREDIT

## 2021-08-09 PROCEDURE — 3331090003 HH PPS REVENUE ADJ

## 2021-08-09 PROCEDURE — 3331090002 HH PPS REVENUE DEBIT

## 2021-09-27 ENCOUNTER — TRANSCRIBE ORDER (OUTPATIENT)
Dept: SCHEDULING | Age: 76
End: 2021-09-27

## 2021-09-27 ENCOUNTER — HOSPITAL ENCOUNTER (EMERGENCY)
Age: 76
Discharge: HOME | End: 2021-09-27
Attending: EMERGENCY MEDICINE | Admitting: EMERGENCY MEDICINE
Payer: MEDICARE

## 2021-09-27 VITALS
HEART RATE: 70 BPM | SYSTOLIC BLOOD PRESSURE: 136 MMHG | OXYGEN SATURATION: 98 % | RESPIRATION RATE: 16 BRPM | DIASTOLIC BLOOD PRESSURE: 92 MMHG | TEMPERATURE: 97.6 F

## 2021-09-27 DIAGNOSIS — R04.0 EPISTAXIS: Primary | ICD-10-CM

## 2021-09-27 DIAGNOSIS — Z12.31 SCREENING MAMMOGRAM FOR HIGH-RISK PATIENT: Primary | ICD-10-CM

## 2021-09-27 PROCEDURE — 99281 EMR DPT VST MAYX REQ PHY/QHP: CPT

## 2021-09-27 RX ORDER — CETIRIZINE HCL 10 MG
10 TABLET ORAL DAILY
Qty: 30 TABLET | Refills: 0 | Status: SHIPPED | OUTPATIENT
Start: 2021-09-27 | End: 2021-11-24

## 2021-09-27 NOTE — PROGRESS NOTES
Appreciate Senior Services Consult    CM met with patient in private area of ED lobby. She was tearful when she spoke about the death of her spouse approximately 4 years ago, they were  for 50 years. Her son, Davonte Rehman lives with her. During the pandemic she tells writer she has felt like she has little to no social interactions with others. Discussed Senior Connections and since the weather is better, small walks. Discussed Senior Living options including Independent Living and Assisted Living. Resources given.     Dax Day RN, BSN, [facility]  ED Care Management  [phone]

## 2021-09-27 NOTE — ED PROVIDER NOTES
This is a 54-year-old female who presents ambulatory to the emergency room with complaints of epistaxis. Patient states this has been going on since August, intermittently she will develop nosebleeds. States she had one this morning and passed a large clot \"scaring her enough to come to the emergency room. \"  Patient has seen her PCP regarding this, also [facility] her Flonase. Comes to the emergency room today with continued symptoms despite the use of the Flonase. Denies any chest pain, shortness of breath, dizziness, nausea or vomiting, fevers or chills. States she does have baseline allergies of which she is to use a Catlett pot. Has not used that in a long time. Is not on blood thinners. States she is also having a lot of stress regarding her living conditions and the fact that she would like to go into independent care but is hesitant because her 55-year-old son lives with her. Would like to speak with case management. Tearful in triage, no suicidal or homicidal ideations. There are no further complaints at this time. Padmini Ambrocio MD  Past Medical History:  No date: Arthritis      Comment:  knes and back , foot osteoarthritis  No date: Asthma  No date: Depression with anxiety  No date: Diabetes ([address]  No date: Gout  No date: History of pelvic fracture  No date: Hypertension  No date: Hypothyroidism  No date: Menopause      Comment:  LMP-45years old? No date: Morbid obesity ([address]  No date: Seizures ([facility]      Comment:  had a seizure as a result of medication about 50 years                ago  5/11/2010: Vitamin D deficiency  Past Surgical History:  9/13/2010: ENDOSCOPY, COLON, DIAGNOSTIC      Comment:  -pretty again 9/2015  No date: HX BREAST AUGMENTATION;  Left      Comment:  removal of implant  1992: HX BUNIONECTOMY; Left  No date: HX CHOLECYSTECTOMY  06/2019: HX GI      Comment:  COLONOSCOPY  No date: HX HERNIA REPAIR      Comment:  x 2  No date: HX TONSILLECTOMY  1980: IMPLANT BREAST SILICONE/EQ; Bilateral      Comment:  Lt implant was removed due to leakage. No date: DE BREAST SURGERY PROCEDURE UNLISTED      Comment:  implants for deformity, and removed             Past Medical History:   Diagnosis Date    Arthritis     knes and back , foot osteoarthritis    Asthma     Depression with anxiety     Diabetes ([facility]     Gout     History of pelvic fracture     Hypertension     Hypothyroidism     Menopause     LMP-45years old?  Morbid obesity ([facility]     Seizures ([facility]     had a seizure as a result of medication about 50 years ago    Vitamin D deficiency 2010       Past Surgical History:   Procedure Laterality Date    ENDOSCOPY, COLON, DIAGNOSTIC  2010    -due again 2015    HX BREAST AUGMENTATION Left     removal of implant    HX BUNIONECTOMY Left     HX CHOLECYSTECTOMY      HX GI  2019    COLONOSCOPY    HX HERNIA REPAIR      x 2    HX TONSILLECTOMY      IMPLANT BREAST SILICONE/EQ Bilateral 6159    Lt implant was removed due to leakage.     DE BREAST SURGERY PROCEDURE UNLISTED      implants for deformity, and removed         Family History:   Problem Relation Age of Onset    Cancer Mother         colon     Alcohol abuse Sister     Breast Cancer Maternal Aunt         age unknown    Hypertension Son     Psoriasis Son     Anesth Problems Neg Hx        Social History     Socioeconomic History    Marital status:      Spouse name: Not on file    Number of children: Not on file    Years of education: Not on file    Highest education level: Not on file   Occupational History    Not on file   Tobacco Use    Smoking status: Former Smoker     Packs/day: 0.50     Years: 40.00     Pack years: 20.00     Quit date: 2003     Years since quittin.5    Smokeless tobacco: Never Used   Vaping Use    Vaping Use: Never used   Substance and Sexual Activity    Alcohol use: No    Drug use: No    Sexual activity: Never   Other Topics Concern    Not on file   Social History Narrative    Not on file     Social Determinants of Health     Financial Resource Strain:     Difficulty of Paying Living Expenses:    Food Insecurity:     Worried About Running Out of Food in the Last Year:     [address] in the Last Year:    Transportation Needs:     Lack of Transportation (Medical):  Lack of Transportation (Non-Medical):    Physical Activity:     Days of Exercise per Week:     Minutes of Exercise per Session:    Stress:     Feeling of Stress :    Social Connections:     Frequency of Communication with Friends and Family:     Frequency of Social Gatherings with Friends and Family:     Attends Spiritism Services:     Active Member of Clubs or Organizations:     Attends Club or Organization Meetings:     Marital Status:    Intimate Partner Violence:     Fear of Current or Ex-Partner:     Emotionally Abused:     Physically Abused:     Sexually Abused: ALLERGIES: Allegra [fexofenadine], Codeine, Keflex [cephalexin], and Tussionex    Review of Systems   Constitutional: Negative for activity change, appetite change, chills, diaphoresis, fatigue and fever. HENT: Positive for nosebleeds. Negative for congestion, ear discharge, ear pain, sinus pressure, sinus pain, sore throat and trouble swallowing. Eyes: Negative for photophobia, pain, redness and visual disturbance. Respiratory: Negative for chest tightness, shortness of breath and wheezing. Cardiovascular: Negative for chest pain and palpitations. Gastrointestinal: Negative for abdominal distention, abdominal pain, nausea and vomiting. Endocrine: Negative. Genitourinary: Negative for difficulty urinating, flank pain, frequency, menstrual problem and urgency. Musculoskeletal: Negative for back pain, neck pain and neck stiffness. Skin: Negative for color change, pallor, rash and wound. Allergic/Immunologic: Negative. Neurological: Negative for dizziness, speech difficulty, weakness and headaches. Hematological: Does not bruise/bleed easily. Psychiatric/Behavioral: Negative for behavioral problems. The patient is nervous/anxious. Vitals:    09/27/21 0916   BP: (!) 136/92   Pulse: 70   Resp: 16   Temp: 97.6 °F (36.4 °C)   SpO2: 98%            Physical Exam  Vitals and nursing note reviewed. Constitutional:       General: She is not in acute distress. Appearance: Normal appearance. She is well-developed. She is not ill-appearing. HENT:      Head: Normocephalic and atraumatic. Right Ear: External ear normal.      Left Ear: External ear normal.      Nose: Nose normal. No congestion or rhinorrhea. Comments: No nasal hematoma, no active bleeding       Mouth/Throat:      Mouth: Mucous membranes are moist.   Eyes:      General:         Right eye: No discharge. Left eye: No discharge. Conjunctiva/sclera: Conjunctivae normal.      Pupils: Pupils are equal, round, and reactive to light. Neck:      Vascular: No JVD. Trachea: No tracheal deviation. Cardiovascular:      Rate and Rhythm: Normal rate and regular rhythm. Pulses: Normal pulses. Heart sounds: Normal heart sounds. No murmur heard. No gallop. Pulmonary:      Effort: Pulmonary effort is normal. No respiratory distress. Breath sounds: Normal breath sounds. No wheezing or rales. Chest:      Chest wall: No tenderness. Abdominal:      General: Bowel sounds are normal. There is no distension. Palpations: Abdomen is soft. Tenderness: There is no abdominal tenderness. There is no guarding or rebound. Genitourinary:     Comments: Negative    Musculoskeletal:         General: No tenderness. Normal range of motion. Cervical back: Normal range of motion and neck supple. No tenderness. Skin:     General: Skin is warm and dry. Capillary Refill: Capillary refill takes less than 2 seconds. Coloration: Skin is not pale. Findings: No erythema or rash. Neurological:      General: No focal deficit present. Mental Status: She is alert and oriented to person, place, and time. Motor: No weakness. Coordination: Coordination normal.   Psychiatric:         Mood and Affect: Mood normal.         Behavior: Behavior normal.         Thought Content: Thought content normal.         Judgment: Judgment normal.          MDM  Number of Diagnoses or Management Options  Epistaxis: established and worsening  Diagnosis management comments: Differential diagnosis includes epistaxis, stress, nose injury, hypertension and others. After physical assessment, no indication for laboratory data or imaging. Case management was consulted for outpatient recommendations for possible independent living. Discharged home and follow-up with PCP. Return to the emergency room with worsening symptoms. ENT as an outpatient. Patient in agreement with plan of care. Labs Reviewed - No data to display  No results found. 9:45 AM  Pt has been reexamined. Pt has no new complaints, changes or physical findings. Care plan outlined and precautions discussed. All available results were reviewed with pt. All medications were reviewed with pt. All of pt's questions and concerns were addressed. Pt agrees to F/U as instructed and agrees to return to ED upon further deterioration. Pt is ready to go home.   Majo Rubio NP    .[email or web address]    Procedures

## 2021-09-27 NOTE — ED TRIAGE NOTES
Pt c/o intermittent nose bleed since yesterday, no bleeding at present moment, denies taking blood thinners, denies injury

## 2021-10-05 ENCOUNTER — HOSPITAL ENCOUNTER (OUTPATIENT)
Dept: MAMMOGRAPHY | Age: 76
Discharge: HOME | End: 2021-10-05
Attending: FAMILY MEDICINE
Payer: MEDICARE

## 2021-10-05 DIAGNOSIS — Z12.31 SCREENING MAMMOGRAM FOR HIGH-RISK PATIENT: ICD-10-CM

## 2021-10-05 PROCEDURE — 77067 SCR MAMMO BI INCL CAD: CPT

## 2021-11-24 ENCOUNTER — OFFICE VISIT (OUTPATIENT)
Dept: INTERNAL MEDICINE CLINIC | Age: 76
End: 2021-11-24
Payer: MEDICARE

## 2021-11-24 VITALS
HEART RATE: 68 BPM | DIASTOLIC BLOOD PRESSURE: 60 MMHG | BODY MASS INDEX: 33.97 KG/M2 | RESPIRATION RATE: 16 BRPM | TEMPERATURE: 98 F | HEIGHT: 64 IN | SYSTOLIC BLOOD PRESSURE: 130 MMHG | WEIGHT: 199 LBS | OXYGEN SATURATION: 98 %

## 2021-11-24 DIAGNOSIS — E89.0 POSTABLATIVE HYPOTHYROIDISM: ICD-10-CM

## 2021-11-24 DIAGNOSIS — Z00.00 MEDICARE ANNUAL WELLNESS VISIT, INITIAL: ICD-10-CM

## 2021-11-24 DIAGNOSIS — N81.4 UTERINE PROLAPSE: ICD-10-CM

## 2021-11-24 DIAGNOSIS — Z23 ENCOUNTER FOR IMMUNIZATION: ICD-10-CM

## 2021-11-24 DIAGNOSIS — E66.9 OBESITY (BMI 30.0-34.9): ICD-10-CM

## 2021-11-24 DIAGNOSIS — Z96.653 HISTORY OF TOTAL BILATERAL KNEE REPLACEMENT (TKR): ICD-10-CM

## 2021-11-24 DIAGNOSIS — K21.9 GASTROESOPHAGEAL REFLUX DISEASE WITHOUT ESOPHAGITIS: ICD-10-CM

## 2021-11-24 DIAGNOSIS — E11.9 TYPE 2 DIABETES MELLITUS WITHOUT COMPLICATION, WITHOUT LONG-TERM CURRENT USE OF INSULIN (HCC): ICD-10-CM

## 2021-11-24 DIAGNOSIS — F41.9 CHRONIC ANXIETY: ICD-10-CM

## 2021-11-24 DIAGNOSIS — I10 PRIMARY HYPERTENSION: Primary | ICD-10-CM

## 2021-11-24 PROCEDURE — 90694 VACC AIIV4 NO PRSRV 0.5ML IM: CPT | Performed by: INTERNAL MEDICINE

## 2021-11-24 PROCEDURE — G0438 PPPS, INITIAL VISIT: HCPCS | Performed by: INTERNAL MEDICINE

## 2021-11-24 PROCEDURE — G8510 SCR DEP NEG, NO PLAN REQD: HCPCS | Performed by: INTERNAL MEDICINE

## 2021-11-24 PROCEDURE — G8399 PT W/DXA RESULTS DOCUMENT: HCPCS | Performed by: INTERNAL MEDICINE

## 2021-11-24 PROCEDURE — G8754 DIAS BP LESS 90: HCPCS | Performed by: INTERNAL MEDICINE

## 2021-11-24 PROCEDURE — 1101F PT FALLS ASSESS-DOCD LE1/YR: CPT | Performed by: INTERNAL MEDICINE

## 2021-11-24 PROCEDURE — G8752 SYS BP LESS 140: HCPCS | Performed by: INTERNAL MEDICINE

## 2021-11-24 PROCEDURE — G8427 DOCREV CUR MEDS BY ELIG CLIN: HCPCS | Performed by: INTERNAL MEDICINE

## 2021-11-24 PROCEDURE — G8536 NO DOC ELDER MAL SCRN: HCPCS | Performed by: INTERNAL MEDICINE

## 2021-11-24 PROCEDURE — 1090F PRES/ABSN URINE INCON ASSESS: CPT | Performed by: INTERNAL MEDICINE

## 2021-11-24 PROCEDURE — G8417 CALC BMI ABV UP PARAM F/U: HCPCS | Performed by: INTERNAL MEDICINE

## 2021-11-24 PROCEDURE — G0008 ADMIN INFLUENZA VIRUS VAC: HCPCS | Performed by: INTERNAL MEDICINE

## 2021-11-24 PROCEDURE — 99204 OFFICE O/P NEW MOD 45 MIN: CPT | Performed by: INTERNAL MEDICINE

## 2021-11-24 NOTE — PROGRESS NOTES
Health Maintenance Due   Topic Date Due    Shingrix Vaccine Age 49> (1 of 2) Never done    Pneumococcal 65+ years (1 of 1 - PPSV23) Never done    Lipid Screen  06/22/2016    Medicare Yearly Exam  Never done    Flu Vaccine (1) Never done    COVID-19 Vaccine (3 - Booster for Hunt Peter series) 09/19/2021       Chief Complaint   Patient presents with   [address] [facility] Annual Wellness Visit       1. Have you been to the ER, urgent care clinic since your last visit? Hospitalized since your last visit? Yes,  [facility] -9/26/21- [facility] ED in 9/21 for nose bleeds    2. Have you seen or consulted any other health care providers outside of the [address] [place] [address] since your last visit? Include any pap smears or colon screening. ENT - Dr Reno and Dr. Isabelle Morillo    3) Do you have an Advance Directive on file? No    4) Are you interested in receiving information on Advance Directives? No      Patient is accompanied by I have received verbal consent from Ketty Fraser to discuss any/all medical information while they are present in the room.

## 2021-11-24 NOTE — PROGRESS NOTES
This is an Initial Medicare Annual Wellness Exam (AWV) (Performed 12 months after IPPE or effective date of Medicare Part B enrollment, Once in a lifetime)    I have reviewed the patient's medical history in detail and updated the computerized patient record. Assessment/Plan   Education and counseling provided:  Are appropriate based on today's review and evaluation    1. Primary hypertension  2. Type 2 diabetes mellitus without complication, without long-term current use of insulin (HCC)  -     LIPID PANEL; Future  -     METABOLIC PANEL, COMPREHENSIVE; Future  -     MICROALBUMIN, UR, RAND W/ MICROALB/CREAT RATIO; Future  -     HEMOGLOBIN A1C WITH EAG; Future  3. Postablative hypothyroidism  -     TSH 3RD GENERATION; Future  4. Chronic anxiety  5. History of total bilateral knee replacement (TKR)  6. Gastroesophageal reflux disease without esophagitis  7. Encounter for immunization  -     FLU (FLUAD QUAD INFLUENZA VACCINE,QUAD,ADJUVANTED)  8. Medicare annual wellness visit, initial  9. Uterine prolapse  -     REFERRAL TO OBSTETRICS AND GYNECOLOGY       Depression Risk Factor Screening     3 most recent PHQ Screens 11/24/2021   Little interest or pleasure in doing things Not at all   Feeling down, depressed, irritable, or hopeless Not at all   Total Score PHQ 2 0       Alcohol Risk Screen    Do you average more than 1 drink per night or more than 7 drinks a week:  No    On any one occasion in the past three months have you have had more than 3 drinks containing alcohol:  No         Functional Ability and Level of Safety    Hearing: Hearing is good. Activities of Daily Living: The home contains: no safety equipment. Patient needs help with:  transportation     Ambulation: with mild difficulty      Fall Risk:  Fall Risk Assessment, last 12 mths 11/24/2021   Able to walk? Yes   Fall in past 12 months? 1   Do you feel unsteady? 0   Are you worried about falling 1   Is TUG test greater than 12 seconds?  0   Is the gait abnormal? 0   Number of falls in past 12 months 1   Fall with injury? 1      Abuse Screen:  Patient is not abused       Cognitive Screening    Has your family/caregiver stated any concerns about your memory: no     Cognitive Screening: A+O x 3    Health Maintenance Due     Health Maintenance Due   Topic Date Due    Shingrix Vaccine Age 49> (1 of 2) Never done    Pneumococcal 65+ years (1 of 1 - PPSV23) Never done    Lipid Screen  06/22/2016    Flu Vaccine (1) Never done    COVID-19 Vaccine (3 - Booster for Pfizer series) 09/19/2021       Patient Care Team   Patient Care Team:  Madonna Mahan DO as PCP - General (Internal Medicine)    History     Patient Active Problem List   Diagnosis Code    Hypothyroidism E03.9    Hypertension I10    Vitamin D deficiency E55.9    Chronic anxiety F41.9    Impaired glucose tolerance R73.02    Primary localized osteoarthritis of left knee M17.12    Primary localized osteoarthritis of right knee M17.11    History of total bilateral knee replacement (TKR) U71.947    Type 2 diabetes mellitus without complication, without long-term current use of insulin (HCC) E11.9    Gastroesophageal reflux disease without esophagitis K21.9    Uterine prolapse N81.4     Past Medical History:   Diagnosis Date    Arthritis     knes and back , foot osteoarthritis    Asthma     Depression with anxiety     Diabetes ([address]     Gout     History of pelvic fracture     Hypertension     Hypothyroidism     Menopause     LMP-45years old?     Morbid obesity ([address]     Seizures ([address]     had a seizure as a result of medication about 50 years ago    Vitamin D deficiency 5/11/2010      Past Surgical History:   Procedure Laterality Date    ENDOSCOPY, COLON, DIAGNOSTIC  9/13/2010    -due again 9/2015    HX BREAST AUGMENTATION Left     removal of implant    HX BUNIONECTOMY Left 1992    HX CHOLECYSTECTOMY      HX GI  06/2019    COLONOSCOPY    HX HERNIA REPAIR      x 2    HX TONSILLECTOMY      IMPLANT BREAST SILICONE/EQ Bilateral 5187    Lt implant was removed due to leakage.  [place] BREAST SURGERY PROCEDURE UNLISTED      implants for deformity, and removed     Current Outpatient Medications   Medication Sig Dispense Refill    clonazePAM (KlonoPIN) 0.5 mg tablet Take 0.5 mg by mouth nightly.  famotidine (Pepcid) 20 mg tablet Take 20 mg by mouth nightly.  traZODone (DESYREL) 50 mg tablet Take 50 mg by mouth nightly.  atorvastatin (LIPITOR) 10 mg tablet Take 10 mg by mouth nightly.  montelukast (SINGULAIR) 10 mg tablet Take 10 mg by mouth nightly.  metFORMIN (GLUMETZA ER) 500 mg TG24 24 hour tablet Take 1 Tab by mouth every evening.  escitalopram oxalate (LEXAPRO) 20 mg tablet Take 20 mg by mouth every evening.  lisinopril (PRINIVIL, ZESTRIL) 40 mg tablet Take 40 mg by mouth every morning.  hydroCHLOROthiazide (HYDRODIURIL) 12.5 mg tablet Take 12.5 mg by mouth every evening.  levothyroxine (SYNTHROID) 112 mcg tablet Take 112 mcg by mouth Daily (before breakfast).  Cholecalciferol, Vitamin D3, (VITAMIN D3) 1,000 unit Cap Take 2,000 Units by mouth every evening.        Allergies   Allergen Reactions    Allegra [Fexofenadine] Other (comments)     High anxiety patient states    Codeine Anxiety    Keflex [Cephalexin] Other (comments)     hallunications-     Tussionex Other (comments)     Patient states she gags when she takes this         Family History   Problem Relation Age of Onset   Bell Cancer Mother         colon     Alcohol abuse Sister     Breast Cancer Maternal Aunt         age unknown    Hypertension Son    Bell Psoriasis Son     Anesth Problems Neg Hx      Social History     Tobacco Use    Smoking status: Former Smoker     Packs/day: 0.50     Years: 40.00     Pack years: 20.00     Quit date: 2003     Years since quittin.6    Smokeless tobacco: Never Used   Substance Use Topics    Alcohol use: No       Somers Pulling, DO

## 2021-11-25 LAB
ALBUMIN SERPL-MCNC: 4.5 G/DL (ref 3.7–4.7)
ALBUMIN/CREAT UR: 7 MG/G CREAT (ref 0–29)
ALBUMIN/GLOB SERPL: 1.7 {RATIO} (ref 1.2–2.2)
ALP SERPL-CCNC: 119 IU/L (ref 44–121)
ALT SERPL-CCNC: 33 IU/L (ref 0–32)
AST SERPL-CCNC: 25 IU/L (ref 0–40)
BILIRUB SERPL-MCNC: <0.2 MG/DL (ref 0–1.2)
BUN SERPL-MCNC: 11 MG/DL (ref 8–27)
BUN/CREAT SERPL: 16 (ref 12–28)
CALCIUM SERPL-MCNC: 10 MG/DL (ref 8.7–10.3)
CHLORIDE SERPL-SCNC: 98 MMOL/L (ref 96–106)
CHOLEST SERPL-MCNC: 150 MG/DL (ref 100–199)
CO2 SERPL-SCNC: 26 MMOL/L (ref 20–29)
CREAT SERPL-MCNC: 0.69 MG/DL (ref 0.57–1)
CREAT UR-MCNC: 54 MG/DL
EST. AVERAGE GLUCOSE BLD GHB EST-MCNC: 160 MG/DL
GLOBULIN SER CALC-MCNC: 2.6 G/DL (ref 1.5–4.5)
GLUCOSE SERPL-MCNC: 98 MG/DL (ref 65–99)
HBA1C MFR BLD: 7.2 % (ref 4.8–5.6)
HDLC SERPL-MCNC: 75 MG/DL
IMP & REVIEW OF LAB RESULTS: NORMAL
LDLC SERPL CALC-MCNC: 62 MG/DL (ref 0–99)
MICROALBUMIN UR-MCNC: 4 UG/ML
POTASSIUM SERPL-SCNC: 4.1 MMOL/L (ref 3.5–5.2)
PROT SERPL-MCNC: 7.1 G/DL (ref 6–8.5)
SODIUM SERPL-SCNC: 137 MMOL/L (ref 134–144)
TRIGL SERPL-MCNC: 65 MG/DL (ref 0–149)
TSH SERPL DL<=0.005 MIU/L-ACNC: 1.92 UIU/ML (ref 0.45–4.5)
VLDLC SERPL CALC-MCNC: 13 MG/DL (ref 5–40)

## 2021-11-26 NOTE — PROGRESS NOTES
HgbA1c elevated to 7.2. May increase Metformin XR to 1000 mg po daily. Watch diet for foods high in sugar and carbohydrates. Regular exercise, as tolerated. Otherwise, stable labs.

## 2021-11-29 ENCOUNTER — TELEPHONE (OUTPATIENT)
Dept: INTERNAL MEDICINE CLINIC | Age: 76
End: 2021-11-29

## 2021-11-29 DIAGNOSIS — I10 PRIMARY HYPERTENSION: ICD-10-CM

## 2021-11-29 DIAGNOSIS — K21.9 GASTROESOPHAGEAL REFLUX DISEASE WITHOUT ESOPHAGITIS: Primary | ICD-10-CM

## 2021-11-29 DIAGNOSIS — F32.89 OTHER DEPRESSION: ICD-10-CM

## 2021-11-29 DIAGNOSIS — E89.0 POSTABLATIVE HYPOTHYROIDISM: ICD-10-CM

## 2021-11-29 DIAGNOSIS — E11.9 TYPE 2 DIABETES MELLITUS WITHOUT COMPLICATION, WITHOUT LONG-TERM CURRENT USE OF INSULIN (HCC): ICD-10-CM

## 2021-11-29 RX ORDER — LEVOTHYROXINE SODIUM 112 UG/1
112 TABLET ORAL
Qty: 90 TABLET | Refills: 1 | Status: SHIPPED | OUTPATIENT
Start: 2021-11-29 | End: 2022-03-15 | Stop reason: SDUPTHER

## 2021-11-29 RX ORDER — FAMOTIDINE 20 MG/1
20 TABLET, FILM COATED ORAL
Qty: 90 TABLET | Refills: 1 | Status: SHIPPED | OUTPATIENT
Start: 2021-11-29 | End: 2022-01-10 | Stop reason: SDUPTHER

## 2021-11-29 RX ORDER — METFORMIN HYDROCHLORIDE 500 MG/1
2 TABLET, FILM COATED, EXTENDED RELEASE ORAL EVERY EVENING
Qty: 180 TABLET | Refills: 1 | Status: SHIPPED | OUTPATIENT
Start: 2021-11-29 | End: 2021-12-03 | Stop reason: SDUPTHER

## 2021-11-29 NOTE — TELEPHONE ENCOUNTER
----- Message from Kamilah Henning NP sent at 11/26/2021  5:03 PM EST -----  HgbA1c elevated to 7.2. May increase Metformin XR to 1000 mg po daily. Watch diet for foods high in sugar and carbohydrates. Regular exercise, as tolerated. Otherwise, stable labs.

## 2021-12-01 RX ORDER — ESCITALOPRAM OXALATE 20 MG/1
20 TABLET ORAL EVERY EVENING
Qty: 90 TABLET | Refills: 1 | Status: SHIPPED | OUTPATIENT
Start: 2021-12-01 | End: 2022-06-18

## 2021-12-01 RX ORDER — LISINOPRIL 40 MG/1
40 TABLET ORAL
Qty: 90 TABLET | Refills: 1 | Status: SHIPPED | OUTPATIENT
Start: 2021-12-01 | End: 2022-07-08

## 2021-12-01 RX ORDER — HYDROCHLOROTHIAZIDE 12.5 MG/1
12.5 TABLET ORAL EVERY EVENING
Qty: 90 TABLET | Refills: 1 | Status: SHIPPED | OUTPATIENT
Start: 2021-12-01 | End: 2022-07-08

## 2021-12-01 NOTE — PROGRESS NOTES
HISTORY OF PRESENT ILLNESS  James Keith is a 67 y.o. female. New pt who comes in to establish care. I reviewed records in clinic care. Vital signs are stable. Has a few chronic medical problems including HTN, DM, hypothyroidism, anxiety, GERD, uterine prolapse, obesity, DJD. Has had bilateral knee replacements. Reports having arthritic pain. BMI is 34.2. She is on a few meds as documented and reviewed with pt. Reports compliance with meds. Tries to be active physically as tolerated. Trying to watch her diet. Denies smoking. Drinks alcohol socially. Reports all chronic medical issues being stable on current regimen. Labs in 7/2021 were stable. Due for labs. Has had Covid vaccination. Denies any related issues. No other new c/o. HPI    Review of Systems   Constitutional: Negative. HENT: Negative. Eyes: Negative. Negative for blurred vision. Respiratory: Negative. Negative for shortness of breath. Cardiovascular: Negative. Negative for chest pain and leg swelling. Gastrointestinal: Positive for heartburn. Negative for abdominal pain. Genitourinary: Negative. Negative for dysuria. Musculoskeletal: Positive for joint pain. Negative for falls. Skin: Negative. Neurological: Negative. Negative for dizziness, sensory change, focal weakness and headaches. Endo/Heme/Allergies: Negative. Negative for polydipsia. Psychiatric/Behavioral: Negative for depression. The patient is nervous/anxious. The patient does not have insomnia. Physical Exam  Vitals and nursing note reviewed. Constitutional:       General: She is not in acute distress. Appearance: She is well-developed. She is obese. HENT:      Head: Normocephalic and atraumatic. Mouth/Throat:      Mouth: Mucous membranes are moist.      Pharynx: Oropharynx is clear. Eyes:      General: No scleral icterus.      Conjunctiva/sclera: Conjunctivae normal.      Pupils: Pupils are equal, round, and reactive to light. Neck:      Thyroid: No thyromegaly. Vascular: No carotid bruit or JVD. Cardiovascular:      Rate and Rhythm: Normal rate and regular rhythm. Heart sounds: Normal heart sounds. No murmur heard. Pulmonary:      Effort: Pulmonary effort is normal. No respiratory distress. Breath sounds: Normal breath sounds. No wheezing or rales. Abdominal:      General: Bowel sounds are normal. There is no distension. Palpations: Abdomen is soft. Tenderness: There is no abdominal tenderness. There is no right CVA tenderness or left CVA tenderness. Comments: Obese   Musculoskeletal:         General: Tenderness (Knees with TKR scars) present. Cervical back: Normal range of motion and neck supple. Right lower leg: No edema. Lymphadenopathy:      Cervical: No cervical adenopathy. Skin:     General: Skin is warm and dry. Findings: No erythema or rash. Neurological:      Mental Status: She is alert and oriented to person, place, and time. Cranial Nerves: No cranial nerve deficit. Coordination: Coordination normal.   Psychiatric:         Behavior: Behavior normal.         ASSESSMENT and PLAN  Diagnoses and all orders for this visit:    1. Primary hypertension  Stable chronic condition. Continue current treatment/medications. Monitor BP at home with goal of 140/90 or less    2. Type 2 diabetes mellitus without complication, without long-term current use of insulin (HCC)  -     LIPID PANEL; Future  -     METABOLIC PANEL, COMPREHENSIVE; Future  -     MICROALBUMIN, UR, RAND W/ MICROALB/CREAT RATIO; Future  -     HEMOGLOBIN A1C WITH EAG; Future  Monitor BS at home with goal of 100-150    3. Postablative hypothyroidism  -     TSH 3RD GENERATION; Future    4. Chronic anxiety  Stable chronic condition. Continue current treatment/medications. Potential side effects of clonazepam discussed  5. History of total bilateral knee replacement (TKR)  Tylenol as needed  6. Gastroesophageal reflux disease without esophagitis  Stable chronic condition. Continue current treatment/medications. 7. Encounter for immunization  -     FLU ([facility])    8. Medicare annual wellness visit, initial    9. Uterine prolapse  -     REFERRAL TO OBSTETRICS AND GYNECOLOGY    10. Obesity (BMI 30.0-34. 9)  Advised patient to lose weight by watching diet (decreasing sugars/carbs/fat, increasing fruits/vegetables), exercising at least 30 minutes daily, getting 7-8 hours of sleep daily, drinking plenty of water, and decreasing stress  Other orders  -     METABOLIC PANEL, COMPREHENSIVE  -     LIPID PANEL  -     MICROALBUMIN, UR, RAND W/ MICROALB/CREAT RATIO  -     HEMOGLOBIN A1C WITH EAG  -     TSH 3RD GENERATION  -     CVD REPORT      Follow-up and Dispositions    · Return in about 4 months (around 3/24/2022). All chronic medical problems are stable  Continue with current medical management and plan  lab results and schedule of future lab studies reviewed with patient  reviewed diet, exercise and weight control  reviewed medications and side effects in detail  F/u with other MD's/ providers as scheduled  COVID-19 precautions discussed with pt  An After Visit Summary was printed and given to the patient.

## 2021-12-03 ENCOUNTER — TELEPHONE (OUTPATIENT)
Dept: INTERNAL MEDICINE CLINIC | Age: 76
End: 2021-12-03

## 2021-12-03 DIAGNOSIS — E11.9 TYPE 2 DIABETES MELLITUS WITHOUT COMPLICATION, WITHOUT LONG-TERM CURRENT USE OF INSULIN (HCC): ICD-10-CM

## 2021-12-03 DIAGNOSIS — F32.89 OTHER DEPRESSION: ICD-10-CM

## 2021-12-03 DIAGNOSIS — E78.2 MIXED HYPERLIPIDEMIA: Primary | ICD-10-CM

## 2021-12-03 RX ORDER — ATORVASTATIN CALCIUM 10 MG/1
10 TABLET, FILM COATED ORAL
Qty: 30 TABLET | Refills: 1 | Status: SHIPPED | OUTPATIENT
Start: 2021-12-03 | End: 2022-01-31

## 2021-12-03 RX ORDER — TRAZODONE HYDROCHLORIDE 50 MG/1
50 TABLET ORAL
Qty: 90 TABLET | Refills: 1 | Status: SHIPPED | OUTPATIENT
Start: 2021-12-03 | End: 2022-05-26

## 2021-12-03 RX ORDER — METFORMIN HYDROCHLORIDE 500 MG/1
2 TABLET, FILM COATED, EXTENDED RELEASE ORAL EVERY EVENING
Qty: 180 TABLET | Refills: 1 | Status: SHIPPED | OUTPATIENT
Start: 2021-12-03 | End: 2022-03-14 | Stop reason: SDUPTHER

## 2021-12-03 NOTE — TELEPHONE ENCOUNTER
Call placed to pt, discussed her medication, She has enough for 2 months and will back back when she needs a refill.

## 2021-12-03 NOTE — TELEPHONE ENCOUNTER
pls resend script for metformin- pharmacy had to cancel script- possible conflicting info 509CM or 1000mg?     Lov: 11/24/21  Nov: 3/25/22

## 2022-01-10 DIAGNOSIS — K21.9 GASTROESOPHAGEAL REFLUX DISEASE WITHOUT ESOPHAGITIS: ICD-10-CM

## 2022-01-10 RX ORDER — FAMOTIDINE 20 MG/1
20 TABLET, FILM COATED ORAL
Qty: 90 TABLET | Refills: 1 | Status: SHIPPED | OUTPATIENT
Start: 2022-01-10 | End: 2022-03-15 | Stop reason: SDUPTHER

## 2022-02-02 DIAGNOSIS — F41.9 CHRONIC ANXIETY: Primary | ICD-10-CM

## 2022-02-02 RX ORDER — CLONAZEPAM 0.5 MG/1
0.5 TABLET ORAL
Qty: 30 TABLET | Refills: 0 | Status: SHIPPED | OUTPATIENT
Start: 2022-02-02 | End: 2022-03-25 | Stop reason: SDUPTHER

## 2022-02-16 ENCOUNTER — OFFICE VISIT (OUTPATIENT)
Dept: OBGYN CLINIC | Age: 77
End: 2022-02-16
Payer: MEDICARE

## 2022-02-16 VITALS — WEIGHT: 195 LBS | BODY MASS INDEX: 33.47 KG/M2

## 2022-02-16 DIAGNOSIS — N81.4 UTERINE PROLAPSE: Primary | ICD-10-CM

## 2022-02-16 PROCEDURE — 99203 OFFICE O/P NEW LOW 30 MIN: CPT | Performed by: OBSTETRICS & GYNECOLOGY

## 2022-02-16 NOTE — PROGRESS NOTES
Pelvic Organ Prolapse    CC: Pelvic Organ Prolapse    HPI: Ms. Mccauley January is a 67 y.o.  female presenting for evaluation of pelvic organ prolapse. She has no additional complaints and has not yet been evaluated for prolapse by me. The prolapse is bothersome? yes     Onset: over 6 months ago. Location: pelvis  Duration: above      Other associated symptoms:   Denies vaginal bleeding  Stress incontinence? yes  Urge in continence? no  Pt has noticed something bulging out of her vagina for several months and it is progressively getting worse. She does not always empty her bladder or bowels completely due to this bulge. She does not push it back in as she is unsure about what she is touching. She has not been sexually active since . She had 2 children vaginally (9#6, 8#5) and had an episiotomy with her first child. Past Medical History:   Diagnosis Date    Arthritis     knes and back , foot osteoarthritis    Asthma     Depression with anxiety     Diabetes ([address]     Gout     History of pelvic fracture     Hypertension     Hypothyroidism     Menopause     LMP-45years old?  Morbid obesity ([address]     Seizures ([address]     had a seizure as a result of medication about 50 years ago    Vitamin D deficiency 2010       Past Surgical History:   Procedure Laterality Date    ENDOSCOPY, COLON, DIAGNOSTIC  2010    -due again 2015    HX BREAST AUGMENTATION Left     removal of implant    HX BUNIONECTOMY Left     HX CHOLECYSTECTOMY      HX GI  2019    COLONOSCOPY    HX HERNIA REPAIR      x 2    HX TONSILLECTOMY      IMPLANT BREAST SILICONE/EQ Bilateral 3916    Lt implant was removed due to leakage.     [place] BREAST SURGERY PROCEDURE UNLISTED      implants for deformity, and removed       Family History   Problem Relation Age of Onset    Cancer Mother         colon     Alcohol abuse Sister     Breast Cancer Maternal Aunt         age unknown    Hypertension Son  Psoriasis Son     Anesth Problems Neg Hx        Social History     Socioeconomic History    Marital status:      Spouse name: Not on file    Number of children: Not on file    Years of education: Not on file    Highest education level: Not on file   Occupational History    Not on file   Tobacco Use    Smoking status: Former Smoker     Packs/day: 0.50     Years: 40.00     Pack years: 20.00     Quit date: 2003     Years since quittin.8    Smokeless tobacco: Never Used   Vaping Use    Vaping Use: Never used   Substance and Sexual Activity    Alcohol use: No    Drug use: No    Sexual activity: Never   Other Topics Concern    Not on file   Social History Narrative    Not on file     Social Determinants of Health     Financial Resource Strain:     Difficulty of Paying Living Expenses: Not on file   Food Insecurity:     Worried About [address] in the Last Year: Not on file    Aga of Food in the Last Year: Not on file   Transportation Needs:     Lack of Transportation (Medical): Not on file    Lack of Transportation (Non-Medical):  Not on file   Physical Activity:     Days of Exercise per Week: Not on file    Minutes of Exercise per Session: Not on file   Stress:     Feeling of Stress : Not on file   Social Connections:     Frequency of Communication with Friends and Family: Not on file    Frequency of Social Gatherings with Friends and Family: Not on file    Attends Voodoo Services: Not on file    Active Member of Clubs or Organizations: Not on file    Attends Club or Organization Meetings: Not on file    Marital Status: Not on file   Intimate Partner Violence:     Fear of Current or Ex-Partner: Not on file    Emotionally Abused: Not on file    Physically Abused: Not on file    Sexually Abused: Not on file   Housing Stability:     Unable to Pay for Housing in the Last Year: Not on file    Number of Places Lived in the Last Year: Not on file    Unstable Housing in the Last Year: Not on file       Current Outpatient Medications   Medication Sig Dispense Refill    clonazePAM (KlonoPIN) 0.5 mg tablet Take 1 Tablet by mouth nightly. Max Daily Amount: 0.5 mg. 30 Tablet 0    atorvastatin (LIPITOR) 10 mg tablet TAKE 1 TABLET EVERY NIGHT 90 Tablet 1    famotidine (Pepcid) 20 mg tablet Take 1 Tablet by mouth nightly. Take 20 mg by mouth nightly. 90 Tablet 1    metFORMIN (GLUMETZA ER) 500 mg TG24 24 hour tablet Take 1,000 mg by mouth every evening. 180 Tablet 1    traZODone (DESYREL) 50 mg tablet Take 1 Tablet by mouth nightly. Take 50 mg by mouth nightly. 90 Tablet 1    lisinopriL (PRINIVIL, ZESTRIL) 40 mg tablet Take 1 Tablet by mouth every morning. 90 Tablet 1    hydroCHLOROthiazide (HYDRODIURIL) 12.5 mg tablet Take 1 Tablet by mouth every evening. 90 Tablet 1    escitalopram oxalate (Lexapro) 20 mg tablet Take 1 Tablet by mouth every evening. 90 Tablet 1    levothyroxine (Synthroid) 112 mcg tablet Take 1 Tablet by mouth Daily (before breakfast). Take 112 mcg by mouth Daily (before breakfast). 90 Tablet 1    montelukast (SINGULAIR) 10 mg tablet Take 10 mg by mouth nightly.  Cholecalciferol, Vitamin D3, (VITAMIN D3) 1,000 unit Cap Take 2,000 Units by mouth every evening.          Allergies   Allergen Reactions    Allegra [Fexofenadine] Other (comments)     High anxiety patient states    Codeine Anxiety    Keflex [Cephalexin] Other (comments)     hallunications-     Tussionex Other (comments)     Patient states she gags when she takes this         Review of Systems - History obtained from the patient  Constitutional: negative for weight loss, fever, night sweats  HEENT: negative for hearing loss, earache, congestion, snoring, sorethroat  CV: negative for chest pain, palpitations, edema  Resp: negative for cough, shortness of breath, wheezing  GI: negative for change in bowel habits, abdominal pain, black or bloody stools  : negative for frequency, dysuria, hematuria, vaginal discharge  MSK: negative for back pain, joint pain, muscle pain  Breast: negative for breast lumps, nipple discharge, galactorrhea  Skin :negative for itching, rash, hives  Neuro: negative for dizziness, headache, confusion, weakness  Psych: negative for anxiety, depression, change in mood  Heme/lymph: negative for bleeding, bruising, pallor    Physical Exam    Visit Vitals  Wt 195 lb (88.5 kg)   LMP 01/01/1992 (Approximate) Comment: LMP-45years old   BMI 33.47 kg/m²       HENT  · Head and Face: appears normal    Neck  · Inspection/Palpation: normal appearance, no masses or tenderness  · Lymph Nodes: no lymphadenopathy present  · Thyroid: gland size normal, nontender, no nodules or masses present on palpation    Chest  · Respiratory Effort: non-labored breathing  · Auscultation: Clear to auscultation bilaterlly, normal breath sounds    Cardiovascular  · Heart:  · Auscultation: regular rate and rhythm without murmur  · Extremities: no peripheral edema    Gastrointestinal  · Abdominal Examination: abdomen non-tender to palpation, , no masses present  · Liver and spleen: no hepatomegaly present, spleen not palpable  · Hernias: no hernias identified    Genitourinary  · External Genitalia: normal appearance for age, no discharge present, no tenderness present, no inflammatory lesions present, no masses present, no atrophy present  · Vagina: normal vaginal vault without central or paravaginal defects, no discharge present, no inflammatory lesions present, no masses present  · Bladder: non-tender to palpation  · Urethra: appears normal  · Cervix: normal, no cervicitis, no CMT  · Uterus: normal size, shape and consistency, mobile  · Adnexa: no adnexal tenderness present, no adnexal masses present  · Perineum: perineum within normal limits, no evidence of trauma, no rashes or skin lesions present  · STAGE 4 PROLAPSE with sarah-macho    Skin  · General Inspection: no rash, no lesions identified    Neurologic/Psychiatric  · Mental Status:  · Orientation: grossly oriented to person, place and time  · Mood and Affect: mood normal, affect appropriate      Assessment/Plan:  67 y.o. with stage 4 pelvic organ prolapse. Discussed possible etiologies and management options at length including expectant management versus trial of pessary versus surgical options. We discussed the risks and benefits to pessary and surgery. She elects for definitive management with surgery, will refer to Dr. Bing Palacios for surgical management.        RTC:   Annual  Handouts and instructions provided    Amy Melvin  2/16/2022  9:16 AM     Mariola Clark MD

## 2022-02-16 NOTE — PATIENT INSTRUCTIONS
Pelvic Exam: Care Instructions  Overview     When your doctor examines your pelvic organs, it's called a pelvic exam. This exam is done to evaluate symptoms, such as pelvic pain or abnormal vaginal bleeding and discharge. It may also be done to collect samples of cells for cervical cancer screening. Before your exam, it's important to share some information with your doctor. You can talk about any concerns you may have. Your doctor will also want to know if you are pregnant or use birth control. And your doctor will want to hear about any problems, surgeries, or procedures you have had in your pelvic area. You will also need to tell your doctor when your last period was. Follow-up care is a key part of your treatment and safety. Be sure to make and go to all appointments, and call your doctor if you are having problems. It's also a good idea to know your test results and keep a list of the medicines you take. How is a pelvic exam done? · During a pelvic exam, you will:  ? Take off your clothes below the waist. You will get a paper or cloth cover to put over the lower half of your body. ? Lie on your back on an exam table with your feet and legs supported by footrests. · The doctor may:  ? Ask you to relax your knees. Your knees need to lean out, toward the walls. ? Check the opening of your vagina for sores or swelling. ? Gently put a tool called a speculum into your vagina. It opens the vagina a little bit. You may feel some pressure. The speculum lets your doctor see inside the vagina. ? Use a small brush, spatula, or swab to get a sample for testing. The doctor then removes the speculum. ? Put on gloves and put one or two fingers of one hand into your vagina. The other hand goes on your lower belly. This lets your doctor feel your pelvic organs. You will probably feel some pressure. ? Put one gloved finger into your rectum and one into your vagina, if needed.  This can also help check your pelvic organs. You may have a small amount of vaginal discharge or bleeding after the exam.  Why is a pelvic exam done? A pelvic exam may be done:  · To collect samples of cells for cervical cancer screening. · To check for vaginal infection. · To check for sexually transmitted infections, such as chlamydia or herpes. · To help find the cause of abnormal uterine bleeding. · To look for problems like uterine fibroids, ovarian cysts, or uterine prolapse. · To help find the cause of pelvic or belly pain. · Before inserting an intrauterine device (IUD). · To collect evidence if you've been sexually assaulted. What are the risks of a pelvic exam?  There is a small chance that the doctor will find something on a pelvic exam that would not have caused a problem. This is called overdiagnosis. It could lead to tests or treatment you don't need. When should you call for help? Watch closely for changes in your health, and be sure to contact your doctor if you have any problems. Where can you learn more? Go to http://www.gray.com/  Enter M421 in the search box to learn more about \"Pelvic Exam: Care Instructions. \"  Current as of: February 11, 2021               Content Version: 13.0  © 8263-2938 [employer]. Care instructions adapted under license by [employer] (which disclaims liability or warranty for this information). If you have questions about a medical condition or this instruction, always ask your healthcare professional. [place] [address] any warranty or liability for your use of this information.

## 2022-03-14 DIAGNOSIS — E11.9 TYPE 2 DIABETES MELLITUS WITHOUT COMPLICATION, WITHOUT LONG-TERM CURRENT USE OF INSULIN (HCC): ICD-10-CM

## 2022-03-14 RX ORDER — METFORMIN HYDROCHLORIDE 500 MG/1
2 TABLET, FILM COATED, EXTENDED RELEASE ORAL EVERY EVENING
Qty: 180 TABLET | Refills: 1 | Status: SHIPPED | OUTPATIENT
Start: 2022-03-14 | End: 2022-07-26 | Stop reason: SDUPTHER

## 2022-03-15 DIAGNOSIS — E78.2 MIXED HYPERLIPIDEMIA: ICD-10-CM

## 2022-03-15 DIAGNOSIS — E89.0 POSTABLATIVE HYPOTHYROIDISM: ICD-10-CM

## 2022-03-15 DIAGNOSIS — K21.9 GASTROESOPHAGEAL REFLUX DISEASE WITHOUT ESOPHAGITIS: ICD-10-CM

## 2022-03-16 RX ORDER — FAMOTIDINE 20 MG/1
20 TABLET, FILM COATED ORAL
Qty: 90 TABLET | Refills: 1 | Status: SHIPPED | OUTPATIENT
Start: 2022-03-16

## 2022-03-16 RX ORDER — LEVOTHYROXINE SODIUM 112 UG/1
112 TABLET ORAL
Qty: 90 TABLET | Refills: 1 | Status: SHIPPED | OUTPATIENT
Start: 2022-03-16 | End: 2022-06-28 | Stop reason: SDUPTHER

## 2022-03-16 RX ORDER — ATORVASTATIN CALCIUM 10 MG/1
10 TABLET, FILM COATED ORAL
Qty: 90 TABLET | Refills: 1 | Status: SHIPPED | OUTPATIENT
Start: 2022-03-16

## 2022-03-18 PROBLEM — Z96.653 HISTORY OF TOTAL BILATERAL KNEE REPLACEMENT (TKR): Status: ACTIVE | Noted: 2021-11-24

## 2022-03-18 PROBLEM — M17.11 PRIMARY LOCALIZED OSTEOARTHRITIS OF RIGHT KNEE: Status: ACTIVE | Noted: 2021-07-20

## 2022-03-19 PROBLEM — E66.811 OBESITY (BMI 30.0-34.9): Status: ACTIVE | Noted: 2021-11-24

## 2022-03-19 PROBLEM — E66.9 OBESITY (BMI 30.0-34.9): Status: ACTIVE | Noted: 2021-11-24

## 2022-03-19 PROBLEM — K21.9 GASTROESOPHAGEAL REFLUX DISEASE WITHOUT ESOPHAGITIS: Status: ACTIVE | Noted: 2021-11-24

## 2022-03-19 PROBLEM — N81.4 UTERINE PROLAPSE: Status: ACTIVE | Noted: 2021-11-24

## 2022-03-20 PROBLEM — M17.12 PRIMARY LOCALIZED OSTEOARTHRITIS OF LEFT KNEE: Status: ACTIVE | Noted: 2019-10-11

## 2022-03-20 PROBLEM — E11.9 TYPE 2 DIABETES MELLITUS WITHOUT COMPLICATION, WITHOUT LONG-TERM CURRENT USE OF INSULIN (HCC): Status: ACTIVE | Noted: 2021-11-24

## 2022-03-25 ENCOUNTER — OFFICE VISIT (OUTPATIENT)
Dept: INTERNAL MEDICINE CLINIC | Age: 77
End: 2022-03-25
Payer: MEDICARE

## 2022-03-25 VITALS
BODY MASS INDEX: 33.02 KG/M2 | DIASTOLIC BLOOD PRESSURE: 74 MMHG | OXYGEN SATURATION: 96 % | WEIGHT: 193.4 LBS | TEMPERATURE: 98 F | HEIGHT: 64 IN | SYSTOLIC BLOOD PRESSURE: 126 MMHG | HEART RATE: 64 BPM | RESPIRATION RATE: 16 BRPM

## 2022-03-25 DIAGNOSIS — N81.4 UTERINE PROLAPSE: ICD-10-CM

## 2022-03-25 DIAGNOSIS — Z96.653 HISTORY OF TOTAL BILATERAL KNEE REPLACEMENT (TKR): ICD-10-CM

## 2022-03-25 DIAGNOSIS — E66.9 OBESITY (BMI 30.0-34.9): ICD-10-CM

## 2022-03-25 DIAGNOSIS — K21.9 GASTROESOPHAGEAL REFLUX DISEASE WITHOUT ESOPHAGITIS: ICD-10-CM

## 2022-03-25 DIAGNOSIS — E78.2 MIXED HYPERLIPIDEMIA: ICD-10-CM

## 2022-03-25 DIAGNOSIS — I10 PRIMARY HYPERTENSION: ICD-10-CM

## 2022-03-25 DIAGNOSIS — F41.9 CHRONIC ANXIETY: ICD-10-CM

## 2022-03-25 DIAGNOSIS — E89.0 POSTABLATIVE HYPOTHYROIDISM: ICD-10-CM

## 2022-03-25 DIAGNOSIS — E11.9 TYPE 2 DIABETES MELLITUS WITHOUT COMPLICATION, WITHOUT LONG-TERM CURRENT USE OF INSULIN (HCC): Primary | ICD-10-CM

## 2022-03-25 LAB — HBA1C MFR BLD HPLC: 6.6 %

## 2022-03-25 PROCEDURE — 1090F PRES/ABSN URINE INCON ASSESS: CPT | Performed by: INTERNAL MEDICINE

## 2022-03-25 PROCEDURE — G8417 CALC BMI ABV UP PARAM F/U: HCPCS | Performed by: INTERNAL MEDICINE

## 2022-03-25 PROCEDURE — 99214 OFFICE O/P EST MOD 30 MIN: CPT | Performed by: INTERNAL MEDICINE

## 2022-03-25 PROCEDURE — G8399 PT W/DXA RESULTS DOCUMENT: HCPCS | Performed by: INTERNAL MEDICINE

## 2022-03-25 PROCEDURE — G8754 DIAS BP LESS 90: HCPCS | Performed by: INTERNAL MEDICINE

## 2022-03-25 PROCEDURE — G8510 SCR DEP NEG, NO PLAN REQD: HCPCS | Performed by: INTERNAL MEDICINE

## 2022-03-25 PROCEDURE — 83036 HEMOGLOBIN GLYCOSYLATED A1C: CPT | Performed by: INTERNAL MEDICINE

## 2022-03-25 PROCEDURE — G8536 NO DOC ELDER MAL SCRN: HCPCS | Performed by: INTERNAL MEDICINE

## 2022-03-25 PROCEDURE — G8752 SYS BP LESS 140: HCPCS | Performed by: INTERNAL MEDICINE

## 2022-03-25 PROCEDURE — G8427 DOCREV CUR MEDS BY ELIG CLIN: HCPCS | Performed by: INTERNAL MEDICINE

## 2022-03-25 PROCEDURE — 1101F PT FALLS ASSESS-DOCD LE1/YR: CPT | Performed by: INTERNAL MEDICINE

## 2022-03-25 RX ORDER — CLONAZEPAM 0.5 MG/1
0.5 TABLET ORAL
Qty: 30 TABLET | Refills: 3 | Status: SHIPPED | OUTPATIENT
Start: 2022-03-25 | End: 2022-07-19

## 2022-03-25 NOTE — PROGRESS NOTES
Health Maintenance Due   Topic Date Due    Foot Exam Q1  Never done    Eye Exam Retinal or Dilated  Never done    Shingrix Vaccine Age 50> (1 of 2) Never done    Pneumococcal 65+ years (1 of 1 - PPSV23) Never done    COVID-19 Vaccine (3 - Booster for [employer] series) 08/19/2021       Chief Complaint   Patient presents with    Medication Refill    Diabetes    GERD       1. Have you been to the ER, urgent care clinic since your last visit? Hospitalized since your last visit? No    2. Have you seen or consulted any other health care providers outside of the [address] [place] [address] since your last visit? Include any pap smears or colon screening. No    3) Do you have an Advance Directive on file? no    4) Are you interested in receiving information on Advance Directives? NO      Patient is accompanied by self I have received verbal consent from Marleni Osorio to discuss any/all medical information while they are present in the room.

## 2022-04-05 RX ORDER — MONTELUKAST SODIUM 10 MG/1
10 TABLET ORAL
Qty: 90 TABLET | Refills: 1 | Status: SHIPPED | OUTPATIENT
Start: 2022-04-05

## 2022-04-05 NOTE — TELEPHONE ENCOUNTER
Requested Prescriptions     Pending Prescriptions Disp Refills    montelukast (SINGULAIR) 10 mg tablet       Sig: Take 1 Tablet by mouth nightly.      03/25/2022 07/22/2022  michelle panda

## 2022-04-26 NOTE — PROGRESS NOTES
HISTORY OF PRESENT ILLNESS  Rene Givens is a 67 y.o. female. she is here for follow-up. Vital signs are stable. BMI is 33.2. Has lost some weight. PMH includes HTN, DM, hypothyroidism, anxiety, GERD, uterine prolapse, obesity, DJD. Has had bilateral knee replacements. Has chronic anxiety and insomnia. Takes clonazepam nightly. Needs a refill. Has chronic arthritic pains with previous bilateral TKRs. Followed by GYN for uterine prolapse. All other chronic medical issues are stable on current treatment regimen. Med list and most recent studies reviewed. Reports compliance with meds. Tries to be active physically as tolerated. Trying to watch her diet. Denies smoking. Drinks alcohol socially. Has had Covid vaccination. Denies any related issues. No other new c/o. HPI    Review of Systems   Constitutional: Negative. HENT: Negative. Eyes: Negative. Negative for blurred vision. Respiratory: Negative. Negative for shortness of breath. Cardiovascular: Negative. Negative for chest pain and leg swelling. Gastrointestinal: Positive for heartburn. Negative for abdominal pain. Genitourinary: Negative. Negative for dysuria. Musculoskeletal: Positive for joint pain. Negative for falls. Skin: Negative. Neurological: Negative. Negative for dizziness, sensory change, focal weakness and headaches. Endo/Heme/Allergies: Negative. Negative for polydipsia. Psychiatric/Behavioral: Negative for depression. The patient is nervous/anxious. The patient does not have insomnia. Physical Exam  Vitals and nursing note reviewed. Constitutional:       General: She is not in acute distress. Appearance: She is well-developed. She is obese. HENT:      Head: Normocephalic and atraumatic. Mouth/Throat:      Mouth: Mucous membranes are moist.      Pharynx: Oropharynx is clear. Eyes:      General: No scleral icterus.      Conjunctiva/sclera: Conjunctivae normal.   Neck: Thyroid: No thyromegaly. Vascular: No carotid bruit or JVD. Cardiovascular:      Rate and Rhythm: Normal rate and regular rhythm. Heart sounds: Normal heart sounds. No murmur heard. Pulmonary:      Effort: Pulmonary effort is normal. No respiratory distress. Breath sounds: Normal breath sounds. No wheezing or rales. Abdominal:      General: Bowel sounds are normal. There is no distension. Palpations: Abdomen is soft. Tenderness: There is no abdominal tenderness. There is no right CVA tenderness or left CVA tenderness. Comments: Obese   Musculoskeletal:         General: Tenderness (Knees with TKR scars) present. Cervical back: Normal range of motion and neck supple. Right lower leg: No edema. Left lower leg: No edema. Lymphadenopathy:      Cervical: No cervical adenopathy. Skin:     General: Skin is warm and dry. Findings: No erythema or rash. Neurological:      General: No focal deficit present. Mental Status: She is alert and oriented to person, place, and time. Cranial Nerves: No cranial nerve deficit. Coordination: Coordination normal.   Psychiatric:         Behavior: Behavior normal.         ASSESSMENT and PLAN  Diagnoses and all orders for this visit:    1. Type 2 diabetes mellitus without complication, without long-term current use of insulin (HCC)  A1c in the office 6.6  Stable chronic condition. Continue current treatment/medications. 2. Mixed hyperlipidemia    3. Postablative hypothyroidism  Stable chronic condition. Continue current treatment/medications. 4. Chronic anxiety  -     clonazePAM (KlonoPIN) 0.5 mg tablet; Take 1 Tablet by mouth nightly. Max Daily Amount: 0.5 mg.  -     AMB POC HEMOGLOBIN A1C    5. Gastroesophageal reflux disease without esophagitis    6. Primary hypertension  Stable chronic condition. Continue current treatment/medications. \    7. History of total bilateral knee replacement (TKR)    8. Obesity (BMI 30.0-34. 9)  Advised patient to lose weight by watching diet (decreasing sugars/carbs/fat, increasing fruits/vegetables), exercising at least 30 minutes daily, getting 7-8 hours of sleep daily, drinking plenty of water, and decreasing stress    9. Uterine prolapse  Follow-up with GYN as scheduled    Follow-up and Dispositions    · Return in about 4 months (around 7/25/2022). All chronic medical problems are stable  Continue with current medical management and plan  lab results and schedule of future lab studies reviewed with patient  reviewed diet, exercise and weight control  reviewed medications and side effects in detail  F/u with other MD's/ providers as scheduled  COVID-19 precautions discussed with pt  An After Visit Summary was printed and given to the patient.

## 2022-05-24 ENCOUNTER — TELEPHONE (OUTPATIENT)
Dept: INTERNAL MEDICINE CLINIC | Age: 77
End: 2022-05-24

## 2022-05-24 NOTE — TELEPHONE ENCOUNTER
----- Message from Shruthi Boyer sent at 5/24/2022  9:30 AM EDT -----  Subject: Message to Provider    QUESTIONS  Information for Provider? Patient called in stating she had a missed call   the other day. She didn't understand message but said she believes it was   about her immunizations. She is scheduled on Friday to go to the pharmacy   to get her second booster. She would like to know if they need to send   anything over to the office to show this. Please advise.   ---------------------------------------------------------------------------  --------------  CALL BACK INFO  What is the best way for the office to contact you? OK to leave message on   voicemail  Preferred Call Back Phone Number? [phone]  ---------------------------------------------------------------------------  --------------  SCRIPT ANSWERS  Relationship to Patient?  Self

## 2022-05-26 DIAGNOSIS — F32.89 OTHER DEPRESSION: ICD-10-CM

## 2022-05-26 RX ORDER — TRAZODONE HYDROCHLORIDE 50 MG/1
TABLET ORAL
Qty: 90 TABLET | Refills: 1 | Status: SHIPPED | OUTPATIENT
Start: 2022-05-26

## 2022-06-03 ENCOUNTER — OFFICE VISIT (OUTPATIENT)
Dept: URGENT CARE | Age: 77
End: 2022-06-03

## 2022-06-03 ENCOUNTER — NURSE TRIAGE (OUTPATIENT)
Dept: OTHER | Facility: CLINIC | Age: 77
End: 2022-06-03

## 2022-06-03 VITALS
RESPIRATION RATE: 16 BRPM | OXYGEN SATURATION: 97 % | SYSTOLIC BLOOD PRESSURE: 138 MMHG | DIASTOLIC BLOOD PRESSURE: 85 MMHG | TEMPERATURE: 98.6 F | HEART RATE: 65 BPM | HEIGHT: 64 IN | BODY MASS INDEX: 32.95 KG/M2 | WEIGHT: 193 LBS

## 2022-06-03 VITALS — OXYGEN SATURATION: 97 % | HEART RATE: 65 BPM | TEMPERATURE: 98.6 F | RESPIRATION RATE: 16 BRPM

## 2022-06-03 DIAGNOSIS — J01.00 ACUTE NON-RECURRENT MAXILLARY SINUSITIS: ICD-10-CM

## 2022-06-03 DIAGNOSIS — Z11.59 SCREENING FOR VIRAL DISEASE: ICD-10-CM

## 2022-06-03 DIAGNOSIS — R05.9 COUGH: Primary | ICD-10-CM

## 2022-06-03 DIAGNOSIS — Z20.822 ENCOUNTER FOR LABORATORY TESTING FOR COVID-19 VIRUS: Primary | ICD-10-CM

## 2022-06-03 LAB — SARS-COV-2 PCR, POC: NEGATIVE

## 2022-06-03 PROCEDURE — G8432 DEP SCR NOT DOC, RNG: HCPCS | Performed by: FAMILY MEDICINE

## 2022-06-03 PROCEDURE — 99203 OFFICE O/P NEW LOW 30 MIN: CPT | Performed by: FAMILY MEDICINE

## 2022-06-03 PROCEDURE — G8427 DOCREV CUR MEDS BY ELIG CLIN: HCPCS | Performed by: FAMILY MEDICINE

## 2022-06-03 PROCEDURE — 1123F ACP DISCUSS/DSCN MKR DOCD: CPT | Performed by: FAMILY MEDICINE

## 2022-06-03 PROCEDURE — G8399 PT W/DXA RESULTS DOCUMENT: HCPCS | Performed by: FAMILY MEDICINE

## 2022-06-03 PROCEDURE — 87635 SARS-COV-2 COVID-19 AMP PRB: CPT | Performed by: FAMILY MEDICINE

## 2022-06-03 PROCEDURE — G8417 CALC BMI ABV UP PARAM F/U: HCPCS | Performed by: FAMILY MEDICINE

## 2022-06-03 PROCEDURE — G8752 SYS BP LESS 140: HCPCS | Performed by: FAMILY MEDICINE

## 2022-06-03 PROCEDURE — 1090F PRES/ABSN URINE INCON ASSESS: CPT | Performed by: FAMILY MEDICINE

## 2022-06-03 PROCEDURE — 1101F PT FALLS ASSESS-DOCD LE1/YR: CPT | Performed by: FAMILY MEDICINE

## 2022-06-03 PROCEDURE — G8754 DIAS BP LESS 90: HCPCS | Performed by: FAMILY MEDICINE

## 2022-06-03 PROCEDURE — G8536 NO DOC ELDER MAL SCRN: HCPCS | Performed by: FAMILY MEDICINE

## 2022-06-03 RX ORDER — DOXYCYCLINE 100 MG/1
100 TABLET ORAL 2 TIMES DAILY
Qty: 20 TABLET | Refills: 0 | Status: SHIPPED | OUTPATIENT
Start: 2022-06-03 | End: 2022-06-13

## 2022-06-03 NOTE — TELEPHONE ENCOUNTER
Received call from [address] at [facility] with Red Flag Complaint. Subjective: Caller states \"I feel really weak. I've had nasal congestion. I am having a little tightness in my chest. I've been feeling like this for a couple weeks. I felt better and got my second booster last Friday. I am feeling worse. \"     Current Symptoms: tightness in chest, intermittent, not at this time. States breathing is slow. Denies SOB. Sporadic dry cough. Sinus pressure. Swelling under the eyes. Onset: 3 weeks ago; worsening since Friday    Associated Symptoms: fatigue even with minimal exertion. Pain Severity: 7/10;sinus pressure- tightness; constant. Improved after nasal spray. Temperature: denies     What has been tried: Mucinex Q4 hours since yesterday morning without improvement. Saline nasal spray. Tylenol Sinus. LMP: NA Pregnant: NA    Recommended disposition: Go to Office Now    Care advice provided, patient verbalizes understanding; denies any other questions or concerns; instructed to call back for any new or worsening symptoms. Patient/Caller agrees with recommended disposition; writer provided warm transfer to Jayy Hernandez at [facility] for appointment scheduling    Attention Provider: Thank you for allowing me to participate in the care of your patient. The patient was connected to triage in response to information provided to the [facility]. Please do not respond through this encounter as the response is not directed to a shared pool.         Reason for Disposition   Redness or swelling on the cheek, forehead, or around the eye    Protocols used: SINUS PAIN OR CONGESTION-ADULT-[place]

## 2022-06-03 NOTE — PROGRESS NOTES
Shilpi Craig is a 67 y.o. female who presents with worsening sinus congestion/pressure and cough x 2-3 weeks. Denies fever, SOB, N/V/D. Tried tylenol and mucinex without relief. Pt requests COVID testing. The history is provided by the patient. Past Medical History:   Diagnosis Date    Arthritis     knes and back , foot osteoarthritis    Asthma     Depression with anxiety     Diabetes ([facility]     Gout     History of pelvic fracture     Hypertension     Hypothyroidism     Menopause     LMP-45years old?  Morbid obesity ([facility]     Seizures ([facility]     had a seizure as a result of medication about 50 years ago    Vitamin D deficiency 2010        Past Surgical History:   Procedure Laterality Date    ENDOSCOPY, COLON, DIAGNOSTIC  2010    -due again 2015    HX BREAST AUGMENTATION Left     removal of implant    HX BUNIONECTOMY Left     HX CHOLECYSTECTOMY      HX GI  2019    COLONOSCOPY    HX HERNIA REPAIR      x 2    HX TONSILLECTOMY      IMPLANT BREAST SILICONE/EQ Bilateral 5522    Lt implant was removed due to leakage.     [place] BREAST SURGERY PROCEDURE UNLISTED      implants for deformity, and removed         Family History   Problem Relation Age of Onset    Cancer Mother         colon     Alcohol abuse Sister     Breast Cancer Maternal Aunt         age unknown    Hypertension Son     Psoriasis Son     Anesth Problems Neg Hx         Social History     Socioeconomic History    Marital status:      Spouse name: Not on file    Number of children: Not on file    Years of education: Not on file    Highest education level: Not on file   Occupational History    Not on file   Tobacco Use    Smoking status: Former Smoker     Packs/day: 0.50     Years: 40.00     Pack years: 20.00     Quit date: 2003     Years since quittin.1    Smokeless tobacco: Never Used   Vaping Use    Vaping Use: Never used   Substance and Sexual Activity    Alcohol use: No    Drug use: No    Sexual activity: Never   Other Topics Concern    Not on file   Social History Narrative    Not on file     Social Determinants of Health     Financial Resource Strain:     Difficulty of Paying Living Expenses: Not on file   Food Insecurity:     Worried About Running Out of Food in the Last Year: Not on file    Aga of Food in the Last Year: Not on file   Transportation Needs:     Lack of Transportation (Medical): Not on file    Lack of Transportation (Non-Medical): Not on file   Physical Activity:     Days of Exercise per Week: Not on file    Minutes of Exercise per Session: Not on file   Stress:     Feeling of Stress : Not on file   Social Connections:     Frequency of Communication with Friends and Family: Not on file    Frequency of Social Gatherings with Friends and Family: Not on file    Attends Christianity Services: Not on file    Active Member of [address] [place] [address] [employer] or Organizations: Not on file    Attends Club or Organization Meetings: Not on file    Marital Status: Not on file   Intimate Partner Violence:     Fear of Current or Ex-Partner: Not on file    Emotionally Abused: Not on file    Physically Abused: Not on file    Sexually Abused: Not on file   Housing Stability:     Unable to Pay for Housing in the Last Year: Not on file    Number of [place] in the Last Year: Not on file    Unstable Housing in the Last Year: Not on file                ALLERGIES: Cephalexin, Fexofenadine, Tussionex, and Codeine    Review of Systems   Constitutional: Negative for activity change, appetite change and fever. HENT: Positive for congestion, sinus pressure and sinus pain. Negative for ear pain and sore throat. Respiratory: Positive for cough. Negative for shortness of breath. Gastrointestinal: Negative for diarrhea, nausea and vomiting.        Vitals:    06/03/22 1418   BP: 138/85   Pulse: 65   Resp: 16   Temp: 98.6 °F (37 °C)   SpO2: 97%   Weight: 193 lb (87.5 kg) Height: 5' 4\" (1.626 m)       Physical Exam  Vitals and nursing note reviewed. Constitutional:       General: She is not in acute distress. Appearance: She is well-developed. She is not diaphoretic. HENT:      Right Ear: Tympanic membrane, ear canal and external ear normal.      Left Ear: Tympanic membrane, ear canal and external ear normal.      Nose: Congestion present. Right Sinus: Maxillary sinus tenderness present. No frontal sinus tenderness. Left Sinus: Maxillary sinus tenderness present. No frontal sinus tenderness. Mouth/Throat:      Pharynx: No oropharyngeal exudate or posterior oropharyngeal erythema. Tonsils: No tonsillar abscesses. Cardiovascular:      Rate and Rhythm: Normal rate and regular rhythm. Heart sounds: Normal heart sounds. Pulmonary:      Effort: Pulmonary effort is normal. No respiratory distress. Breath sounds: Normal breath sounds. No wheezing or rales. Lymphadenopathy:      Cervical: Cervical adenopathy present. Neurological:      Mental Status: She is alert. Psychiatric:         Behavior: Behavior normal.         Thought Content: Thought content normal.         Judgment: Judgment normal.         MDM    ICD-10-CM ICD-9-CM   1. Cough  R05.9 786.2   2. Screening for viral disease  Z11.59 V73.99   3. Acute non-recurrent maxillary sinusitis  J01.00 461.0       Orders Placed This Encounter    POCT COVID-19, SARS-COV-2, PCR     Order Specific Question:   Is this test for diagnosis or screening? Answer:   Diagnosis of ill patient     Order Specific Question:   Symptomatic for COVID-19 as defined by CDC? Answer:   Yes     Order Specific Question:   Date of Symptom Onset     Answer:   5/20/2022     Order Specific Question:   Hospitalized for COVID-19? Answer:   No     Order Specific Question:   Admitted to ICU for COVID-19? Answer:   No     Order Specific Question:   Employed in healthcare setting?      Answer:   Unknown     Order Specific Question:   Resident in a congregate (group) care setting? Answer:   Unknown     Order Specific Question:   Pregnant? Answer:   No     Order Specific Question:   Previously tested for COVID-19? Answer:   Unknown    doxycycline (ADOXA) 100 mg tablet     Sig: Take 1 Tablet by mouth two (2) times a day for 10 days. Dispense:  20 Tablet     Refill:  0      Start doxycycline  Mucinex prn  The patient is to follow up with PCP [facility]     If signs and symptoms become worse the pt is to go to the ER.      Results for orders placed or performed in visit on 06/03/22   POCT COVID-19, SARS-COV-2, PCR   Result Value Ref Range    SARS-COV-2 PCR, POC Negative Negative       Procedures

## 2022-06-17 DIAGNOSIS — F32.89 OTHER DEPRESSION: ICD-10-CM

## 2022-06-18 RX ORDER — ESCITALOPRAM OXALATE 20 MG/1
TABLET ORAL
Qty: 90 TABLET | Refills: 1 | Status: SHIPPED | OUTPATIENT
Start: 2022-06-18

## 2022-06-22 ENCOUNTER — OFFICE VISIT (OUTPATIENT)
Dept: INTERNAL MEDICINE CLINIC | Age: 77
End: 2022-06-22
Payer: MEDICARE

## 2022-06-22 VITALS
SYSTOLIC BLOOD PRESSURE: 138 MMHG | HEART RATE: 75 BPM | HEIGHT: 64 IN | DIASTOLIC BLOOD PRESSURE: 87 MMHG | BODY MASS INDEX: 33.12 KG/M2 | RESPIRATION RATE: 16 BRPM | OXYGEN SATURATION: 99 % | WEIGHT: 194 LBS | TEMPERATURE: 98.2 F

## 2022-06-22 DIAGNOSIS — E78.2 MIXED HYPERLIPIDEMIA: ICD-10-CM

## 2022-06-22 DIAGNOSIS — E89.0 POSTABLATIVE HYPOTHYROIDISM: ICD-10-CM

## 2022-06-22 DIAGNOSIS — E11.9 TYPE 2 DIABETES MELLITUS WITHOUT COMPLICATION, WITHOUT LONG-TERM CURRENT USE OF INSULIN (HCC): Primary | ICD-10-CM

## 2022-06-22 DIAGNOSIS — E66.9 OBESITY (BMI 30.0-34.9): ICD-10-CM

## 2022-06-22 DIAGNOSIS — I10 PRIMARY HYPERTENSION: ICD-10-CM

## 2022-06-22 PROCEDURE — G8427 DOCREV CUR MEDS BY ELIG CLIN: HCPCS | Performed by: INTERNAL MEDICINE

## 2022-06-22 PROCEDURE — G8752 SYS BP LESS 140: HCPCS | Performed by: INTERNAL MEDICINE

## 2022-06-22 PROCEDURE — G8399 PT W/DXA RESULTS DOCUMENT: HCPCS | Performed by: INTERNAL MEDICINE

## 2022-06-22 PROCEDURE — G8754 DIAS BP LESS 90: HCPCS | Performed by: INTERNAL MEDICINE

## 2022-06-22 PROCEDURE — 1101F PT FALLS ASSESS-DOCD LE1/YR: CPT | Performed by: INTERNAL MEDICINE

## 2022-06-22 PROCEDURE — 1090F PRES/ABSN URINE INCON ASSESS: CPT | Performed by: INTERNAL MEDICINE

## 2022-06-22 PROCEDURE — G8417 CALC BMI ABV UP PARAM F/U: HCPCS | Performed by: INTERNAL MEDICINE

## 2022-06-22 PROCEDURE — G8536 NO DOC ELDER MAL SCRN: HCPCS | Performed by: INTERNAL MEDICINE

## 2022-06-22 PROCEDURE — 99214 OFFICE O/P EST MOD 30 MIN: CPT | Performed by: INTERNAL MEDICINE

## 2022-06-22 PROCEDURE — G8432 DEP SCR NOT DOC, RNG: HCPCS | Performed by: INTERNAL MEDICINE

## 2022-06-22 NOTE — PROGRESS NOTES
HISTORY OF PRESENT ILLNESS  Vianney Mccurdy is a 65 y.o. female. Pt. comes in for f/u. Has a few chronic medical issues as documented including DM, HTN, HLD, hypothyroidism, obesity. Vital signs are stable. BMI 33.3. Reports being a bit sluggish recently. Wonders if it is because of her thyroid. Apparently they gave her generic levothyroxine instead of Synthroid. Last TSH was 1.92. Diabetes has been stable. Denies any neuropathy, vision or skin issues. All chronic medical issues are stable on current treatment regimen. Has had Covid-19 vaccination. Reports taking proper precautions. Denies any related signs or symptoms. PMH/PSH/Allergies/Social History/medication list and most recent studies reviewed with patient. Tobacco use: No  Alcohol use: Social    Reports compliance with medications and diet. Trying to be active physically. Negative. Reports no other new c/o. HPI    Review of Systems   Constitutional: Negative. HENT: Negative. Eyes: Negative. Negative for blurred vision. Respiratory: Negative. Negative for shortness of breath. Cardiovascular: Negative. Negative for chest pain and leg swelling. Gastrointestinal: Positive for heartburn. Negative for abdominal pain. Genitourinary: Negative. Negative for dysuria. Musculoskeletal: Positive for joint pain. Negative for falls. Skin: Negative. Neurological: Negative. Negative for dizziness, sensory change, focal weakness and headaches. Endo/Heme/Allergies: Negative. Negative for polydipsia. Psychiatric/Behavioral: Negative for depression. The patient is nervous/anxious. The patient does not have insomnia. Physical Exam  Vitals and nursing note reviewed. Constitutional:       General: She is not in acute distress. Appearance: She is well-developed. She is obese. HENT:      Head: Normocephalic and atraumatic.       Mouth/Throat:      Mouth: Mucous membranes are moist.      Pharynx: Oropharynx is clear.   Eyes:      General: No scleral icterus. Conjunctiva/sclera: Conjunctivae normal.   Neck:      Thyroid: No thyromegaly. Vascular: No carotid bruit or JVD. Cardiovascular:      Rate and Rhythm: Normal rate and regular rhythm. Heart sounds: Normal heart sounds. No murmur heard. Pulmonary:      Effort: Pulmonary effort is normal. No respiratory distress. Breath sounds: Normal breath sounds. No wheezing or rales. Abdominal:      General: Bowel sounds are normal. There is no distension. Palpations: Abdomen is soft. Tenderness: There is no abdominal tenderness. There is no right CVA tenderness or left CVA tenderness. Comments: Obese   Musculoskeletal:         General: Tenderness (Knees with TKR scars) present. Cervical back: Normal range of motion and neck supple. Right lower leg: No edema. Left lower leg: No edema. Lymphadenopathy:      Cervical: No cervical adenopathy. Skin:     General: Skin is warm and dry. Findings: No erythema or rash. Neurological:      General: No focal deficit present. Mental Status: She is alert and oriented to person, place, and time. Cranial Nerves: No cranial nerve deficit. Coordination: Coordination normal.   Psychiatric:         Behavior: Behavior normal.         ASSESSMENT and PLAN  Diagnoses and all orders for this visit:    1. Type 2 diabetes mellitus without complication, without long-term current use of insulin (HCC)  -     LIPID PANEL; Future  -     METABOLIC PANEL, COMPREHENSIVE; Future  -     HEMOGLOBIN A1C WITH EAG; Future  Monitor BS at home with goal of 100-150   Stable chronic condition. Continue current treatment/medications. 2. Mixed hyperlipidemia  Stable chronic condition. Continue current treatment/medications. 3. Postablative hypothyroidism  -     TSH 3RD GENERATION; Future  -     T4, FREE; Future    4.  Primary hypertension  Monitor BP at home with goal of 140/90 or less Stable chronic condition. Continue current treatment/medications. 5. Obesity (BMI 30.0-34. 9)  Advised patient to lose weight by watching diet (decreasing sugars/carbs/fat, increasing fruits/vegetables), exercising at least 30 minutes daily, getting 7-8 hours of sleep daily, drinking plenty of water, and decreasing stress      Follow-up and Dispositions    · Return in about 6 months (around 12/22/2022). All chronic medical problems are stable  Continue with current medical management and plan  lab results and schedule of future lab studies reviewed with patient  reviewed diet, exercise and weight control  reviewed medications and side effects in detail  F/u with other MD's/ providers as scheduled  COVID-19 precautions discussed with pt  An After Visit Summary was printed and given to the patient.

## 2022-06-22 NOTE — PROGRESS NOTES
Health Maintenance Due   Topic Date Due    Pneumococcal 65+ years (1 - PCV) Never done    Eye Exam Retinal or Dilated  Never done    Shingrix Vaccine Age 50> (2 of 2) 11/03/2020       Chief Complaint   Patient presents with    Discuss Medications    Hypertension    Diabetes       1. Have you been to the ER, urgent care clinic since your last visit? Hospitalized since your last visit? No    2. Have you seen or consulted any other health care providers outside of the [address] [place] [address] since your last visit? Include any pap smears or colon screening. No    3) Do you have an Advance Directive on file? no    4) Are you interested in receiving information on Advance Directives? NO      Patient is accompanied by self I have received verbal consent from Keven Penaloza to discuss any/all medical information while they are present in the room.

## 2022-06-23 LAB
ALBUMIN SERPL-MCNC: 4.9 G/DL (ref 3.7–4.7)
ALBUMIN/GLOB SERPL: 1.9 {RATIO} (ref 1.2–2.2)
ALP SERPL-CCNC: 107 IU/L (ref 44–121)
ALT SERPL-CCNC: 26 IU/L (ref 0–32)
AST SERPL-CCNC: 19 IU/L (ref 0–40)
BILIRUB SERPL-MCNC: 0.3 MG/DL (ref 0–1.2)
BUN SERPL-MCNC: 12 MG/DL (ref 8–27)
BUN/CREAT SERPL: 15 (ref 12–28)
CALCIUM SERPL-MCNC: 10.4 MG/DL (ref 8.7–10.3)
CHLORIDE SERPL-SCNC: 98 MMOL/L (ref 96–106)
CHOLEST SERPL-MCNC: 147 MG/DL (ref 100–199)
CO2 SERPL-SCNC: 25 MMOL/L (ref 20–29)
CREAT SERPL-MCNC: 0.81 MG/DL (ref 0.57–1)
EGFR: 74 ML/MIN/1.73
EST. AVERAGE GLUCOSE BLD GHB EST-MCNC: 146 MG/DL
GLOBULIN SER CALC-MCNC: 2.6 G/DL (ref 1.5–4.5)
GLUCOSE SERPL-MCNC: 139 MG/DL (ref 65–99)
HBA1C MFR BLD: 6.7 % (ref 4.8–5.6)
HDLC SERPL-MCNC: 83 MG/DL
IMP & REVIEW OF LAB RESULTS: NORMAL
LDLC SERPL CALC-MCNC: 50 MG/DL (ref 0–99)
POTASSIUM SERPL-SCNC: 5 MMOL/L (ref 3.5–5.2)
PROT SERPL-MCNC: 7.5 G/DL (ref 6–8.5)
SODIUM SERPL-SCNC: 139 MMOL/L (ref 134–144)
T4 FREE SERPL-MCNC: 1.73 NG/DL (ref 0.82–1.77)
TRIGL SERPL-MCNC: 71 MG/DL (ref 0–149)
TSH SERPL DL<=0.005 MIU/L-ACNC: 0.83 UIU/ML (ref 0.45–4.5)
VLDLC SERPL CALC-MCNC: 14 MG/DL (ref 5–40)

## 2022-07-08 DIAGNOSIS — I10 PRIMARY HYPERTENSION: ICD-10-CM

## 2022-07-08 DIAGNOSIS — E11.9 TYPE 2 DIABETES MELLITUS WITHOUT COMPLICATION, WITHOUT LONG-TERM CURRENT USE OF INSULIN (HCC): ICD-10-CM

## 2022-07-08 RX ORDER — LISINOPRIL 40 MG/1
TABLET ORAL
Qty: 90 TABLET | Refills: 1 | Status: SHIPPED | OUTPATIENT
Start: 2022-07-08

## 2022-07-08 RX ORDER — HYDROCHLOROTHIAZIDE 12.5 MG/1
TABLET ORAL
Qty: 90 TABLET | Refills: 1 | Status: SHIPPED | OUTPATIENT
Start: 2022-07-08

## 2022-07-19 DIAGNOSIS — F41.9 CHRONIC ANXIETY: ICD-10-CM

## 2022-07-19 RX ORDER — CLONAZEPAM 0.5 MG/1
TABLET ORAL
Qty: 30 TABLET | Refills: 2 | Status: SHIPPED | OUTPATIENT
Start: 2022-07-19 | End: 2022-08-04 | Stop reason: SDUPTHER

## 2022-07-26 DIAGNOSIS — E11.9 TYPE 2 DIABETES MELLITUS WITHOUT COMPLICATION, WITHOUT LONG-TERM CURRENT USE OF INSULIN (HCC): ICD-10-CM

## 2022-07-26 RX ORDER — METFORMIN HYDROCHLORIDE 500 MG/1
2 TABLET, FILM COATED, EXTENDED RELEASE ORAL EVERY EVENING
Qty: 180 TABLET | Refills: 1 | Status: SHIPPED | OUTPATIENT
Start: 2022-07-26

## 2022-07-26 NOTE — TELEPHONE ENCOUNTER
Requested Prescriptions     Pending Prescriptions Disp Refills    metFORMIN (GLUMETZA ER) 500 mg TG24 24 hour tablet 180 Tablet 1     Sig: Take 1,000 mg by mouth every evening.   06/22/2022 12/21/2022  Kristopher

## 2022-07-27 ENCOUNTER — OFFICE VISIT (OUTPATIENT)
Dept: URGENT CARE | Age: 77
End: 2022-07-27
Payer: MEDICARE

## 2022-07-27 VITALS
HEART RATE: 66 BPM | DIASTOLIC BLOOD PRESSURE: 78 MMHG | SYSTOLIC BLOOD PRESSURE: 135 MMHG | OXYGEN SATURATION: 97 % | RESPIRATION RATE: 12 BRPM | TEMPERATURE: 98.4 F

## 2022-07-27 DIAGNOSIS — B35.3 TINEA PEDIS OF LEFT FOOT: Primary | ICD-10-CM

## 2022-07-27 PROCEDURE — G8399 PT W/DXA RESULTS DOCUMENT: HCPCS | Performed by: FAMILY MEDICINE

## 2022-07-27 PROCEDURE — G8754 DIAS BP LESS 90: HCPCS | Performed by: FAMILY MEDICINE

## 2022-07-27 PROCEDURE — G8427 DOCREV CUR MEDS BY ELIG CLIN: HCPCS | Performed by: FAMILY MEDICINE

## 2022-07-27 PROCEDURE — 99213 OFFICE O/P EST LOW 20 MIN: CPT | Performed by: FAMILY MEDICINE

## 2022-07-27 PROCEDURE — G8752 SYS BP LESS 140: HCPCS | Performed by: FAMILY MEDICINE

## 2022-07-27 PROCEDURE — 1101F PT FALLS ASSESS-DOCD LE1/YR: CPT | Performed by: FAMILY MEDICINE

## 2022-07-27 PROCEDURE — G8432 DEP SCR NOT DOC, RNG: HCPCS | Performed by: FAMILY MEDICINE

## 2022-07-27 PROCEDURE — 1090F PRES/ABSN URINE INCON ASSESS: CPT | Performed by: FAMILY MEDICINE

## 2022-07-27 PROCEDURE — G8417 CALC BMI ABV UP PARAM F/U: HCPCS | Performed by: FAMILY MEDICINE

## 2022-07-27 PROCEDURE — 1123F ACP DISCUSS/DSCN MKR DOCD: CPT | Performed by: FAMILY MEDICINE

## 2022-07-27 PROCEDURE — G8536 NO DOC ELDER MAL SCRN: HCPCS | Performed by: FAMILY MEDICINE

## 2022-07-27 RX ORDER — NYSTATIN AND TRIAMCINOLONE ACETONIDE 100000; 1 [USP'U]/G; MG/G
OINTMENT TOPICAL 2 TIMES DAILY
Qty: 45 G | Refills: 0 | Status: SHIPPED | OUTPATIENT
Start: 2022-07-27

## 2022-07-27 NOTE — PROGRESS NOTES
Rash   The history is provided by the Patient. This is a new problem. Episode onset: few months. The problem is associated with nothing. There has been no fever. Affected Location: left foot and lower ankle. Associated symptoms include itching. She has tried anti-itch cream for the symptoms. Past Medical History:   Diagnosis Date    Arthritis     knes and back , foot osteoarthritis    Asthma     Depression with anxiety     Diabetes ([address]     Gout     History of pelvic fracture     Hypertension     Hypothyroidism     Menopause     LMP-45years old? Morbid obesity ([address]     Seizures ([facility]     had a seizure as a result of medication about 50 years ago    Vitamin D deficiency 2010        Past Surgical History:   Procedure Laterality Date    ENDOSCOPY, COLON, DIAGNOSTIC  2010    -due again 2015    HX BREAST AUGMENTATION Left     removal of implant    HX BUNIONECTOMY Left     HX CHOLECYSTECTOMY      HX GI  2019    COLONOSCOPY    HX HERNIA REPAIR      x 2    HX TONSILLECTOMY      IMPLANT BREAST SILICONE/EQ Bilateral 9020    Lt implant was removed due to leakage.     HI BREAST SURGERY PROCEDURE UNLISTED      implants for deformity, and removed         Family History   Problem Relation Age of Onset    Cancer Mother         colon     Alcohol abuse Sister     Breast Cancer Maternal Aunt         age unknown    Hypertension Son     Psoriasis Son     Anesth Problems Neg Hx         Social History     Socioeconomic History    Marital status:      Spouse name: Not on file    Number of children: Not on file    Years of education: Not on file    Highest education level: Not on file   Occupational History    Not on file   Tobacco Use    Smoking status: Former     Packs/day: 0.50     Years: 40.00     Pack years: 20.00     Types: Cigarettes     Quit date: 2003     Years since quittin.3    Smokeless tobacco: Never   Vaping Use    Vaping Use: Never used   Substance and Sexual Activity Alcohol use: No    Drug use: No    Sexual activity: Never   Other Topics Concern    Not on file   Social History Narrative    Not on file     Social Determinants of Health     Financial Resource Strain: Not on file   Food Insecurity: Not on file   Transportation Needs: Not on file   Physical Activity: Not on file   Stress: Not on file   Social Connections: Not on file   Intimate Partner Violence: Not on file   Housing Stability: Not on file                ALLERGIES: Cephalexin, Fexofenadine, Tussionex, and Codeine    Review of Systems   Skin:  Positive for itching and rash. All other systems reviewed and are negative. Vitals:    07/27/22 1451   BP: 135/78   Pulse: 66   Resp: 12   Temp: 98.4 °F (36.9 °C)   SpO2: 97%       Physical Exam  Vitals and nursing note reviewed. Skin:     Findings: Rash (erythematous - irregular shape lesion with central clearing) present. Rash is macular and papular. MDM    Procedures      ICD-10-CM ICD-9-CM    1. Tinea pedis of left foot  B35.3 110.4     and lower ankle area         Medications Ordered Today   Medications    nystatin-triamcinolone (MYCOLOG) 100,000-0.1 unit/gram-% ointment     Sig: Apply  to affected area two (2) times a day. Dispense:  45 g     Refill:  0     No results found for any visits on 07/27/22. The patients condition was discussed with the patient and they understand. The patient is to follow up with primary care doctor. If signs and symptoms become worse the pt is to go to the ER. The patient is to take medications as prescribed.

## 2022-08-01 DIAGNOSIS — F41.9 CHRONIC ANXIETY: ICD-10-CM
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
## 2022-08-03 RX ORDER — CLONAZEPAM 0.5 MG/1
TABLET ORAL
Qty: 30 TABLET | Refills: 2 | OUTPATIENT
Start: 2022-08-03

## 2022-08-03 NOTE — TELEPHONE ENCOUNTER
Requested Prescriptions     Pending Prescriptions Disp Refills    clonazePAM (KlonoPIN) 0.5 mg tablet 30 Tablet 2       6/22/2022 is LAST OFFICE VISIT     Future Appointments   Date Time Provider Dilip Valerio   12/21/2022  8:30 AM DO RYDER Ray BS AMB     Requested Prescriptions     Pending Prescriptions Disp Refills    clonazePAM (KlonoPIN) 0.5 mg tablet 30 Tablet 2         [address] [place] [address], [address] [place] [address]  Phone: [phone] Fax: [phone]       6/22/2022 is LAST OFFICE VISIT     Future Appointments   Date Time Provider Dilip Valerio   12/21/2022  8:30 AM DO RYDER Ray AMB

## 2022-08-03 NOTE — TELEPHONE ENCOUNTER
Pt would like the medication sent to her local pharmacy at [facility]. Kristopher conferenced in the patient on the call . Kristopher states they will reverse the claim on this medication. Upon delivery of medication pt claims there was a hole in the bag that was taped up.  This medication was missing

## 2022-08-04 NOTE — TELEPHONE ENCOUNTER
Patient would like a call back at [phone]. She wants to know if an emergency fill of the clonazepam could be sent to her local pharmacy until she get the new refill from [facility].

## 2022-08-05 RX ORDER — CLONAZEPAM 0.5 MG/1
TABLET ORAL
Qty: 10 TABLET | Refills: 0 | Status: SHIPPED | OUTPATIENT
Start: 2022-08-05

## 2022-08-06 ENCOUNTER — TELEPHONE (OUTPATIENT)
Dept: INTERNAL MEDICINE CLINIC | Age: 77
End: 2022-08-06
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
## 2022-08-09 ENCOUNTER — TELEPHONE (OUTPATIENT)
Dept: INTERNAL MEDICINE CLINIC | Age: 77
End: 2022-08-09

## 2022-08-09 DIAGNOSIS — E89.0 POSTABLATIVE HYPOTHYROIDISM: ICD-10-CM

## 2022-08-09 RX ORDER — LEVOTHYROXINE SODIUM 112 UG/1
112 TABLET ORAL
Qty: 90 TABLET | Refills: 1 | Status: CANCELLED | OUTPATIENT
Start: 2022-08-09

## 2022-08-09 RX ORDER — LEVOTHYROXINE SODIUM 112 UG/1
112 TABLET ORAL
Qty: 90 TABLET | Refills: 1 | Status: SHIPPED | OUTPATIENT
Start: 2022-08-09
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
## 2022-08-09 NOTE — TELEPHONE ENCOUNTER
Call placed to patient explained she only takes Synthroid brand name only, not generic version.    Needs script re-sent to local pharmacy   6.22.22 12.21.22

## 2022-08-24 ENCOUNTER — TELEPHONE (OUTPATIENT)
Dept: INTERNAL MEDICINE CLINIC | Age: 77
End: 2022-08-24

## 2022-08-24 NOTE — TELEPHONE ENCOUNTER
Wilder Chase, DO Beers, Tiny Scheuermann  Caller: Unspecified (Today,  8:36 AM)  Synthroid was ordered which is the brand name. Perhaps pharmacy switched it. Recent thyroid numbers are normal.  There should be no significant difference between brand and generic nowadays unless patient has a preference or cannot tolerate generic.

## 2022-08-24 NOTE — TELEPHONE ENCOUNTER
Niecy Swanson was called and verbalized understanding on note below. States that she will be getting care from her previous provider, approved of Ayesha Hand, DO being taken off of banner.

## 2022-08-24 NOTE — TELEPHONE ENCOUNTER
Wants to find out why Dr Evelio Bajwa prescribed the generic and not the brand medication for her Thyroid meds

## 2022-10-03 ENCOUNTER — TRANSCRIBE ORDER (OUTPATIENT)
Dept: SCHEDULING | Age: 77
End: 2022-10-03

## 2022-10-03 DIAGNOSIS — Z12.31 SCREENING MAMMOGRAM FOR HIGH-RISK PATIENT: Primary | ICD-10-CM

## 2022-10-03 DIAGNOSIS — Z12.31 VISIT FOR SCREENING MAMMOGRAM: Primary | ICD-10-CM

## 2022-12-05 ENCOUNTER — HOSPITAL ENCOUNTER (OUTPATIENT)
Dept: MAMMOGRAPHY | Age: 77
Discharge: HOME | End: 2022-12-05
Attending: FAMILY MEDICINE
Payer: MEDICARE

## 2022-12-05 DIAGNOSIS — Z12.31 SCREENING MAMMOGRAM FOR HIGH-RISK PATIENT: ICD-10-CM

## 2022-12-05 PROCEDURE — 77063 BREAST TOMOSYNTHESIS BI: CPT

## 2023-04-21 DIAGNOSIS — Z12.31 VISIT FOR SCREENING MAMMOGRAM: Primary | ICD-10-CM

## 2023-08-08 ENCOUNTER — APPOINTMENT (OUTPATIENT)
Facility: HOSPITAL | Age: 78
End: 2023-08-08
Payer: MEDICARE

## 2023-08-08 ENCOUNTER — HOSPITAL ENCOUNTER (EMERGENCY)
Facility: HOSPITAL | Age: 78
Discharge: HOME | End: 2023-08-08
Attending: EMERGENCY MEDICINE | Admitting: EMERGENCY MEDICINE
Payer: MEDICARE

## 2023-08-08 VITALS
DIASTOLIC BLOOD PRESSURE: 78 MMHG | TEMPERATURE: 97.4 F | HEART RATE: 59 BPM | RESPIRATION RATE: 18 BRPM | SYSTOLIC BLOOD PRESSURE: 140 MMHG | OXYGEN SATURATION: 95 %

## 2023-08-08 DIAGNOSIS — S52.602A CLOSED FRACTURE OF DISTAL ENDS OF LEFT RADIUS AND ULNA, INITIAL ENCOUNTER: Primary | ICD-10-CM

## 2023-08-08 DIAGNOSIS — S52.502A CLOSED FRACTURE OF DISTAL ENDS OF LEFT RADIUS AND ULNA, INITIAL ENCOUNTER: Primary | ICD-10-CM

## 2023-08-08 PROCEDURE — 6370000000 HC RX 637 (ALT 250 FOR IP): Performed by: EMERGENCY MEDICINE

## 2023-08-08 PROCEDURE — 6360000002 HC RX W HCPCS: Performed by: EMERGENCY MEDICINE

## 2023-08-08 PROCEDURE — 25605 CLTX DST RDL FX/EPHYS SEP W/: CPT

## 2023-08-08 PROCEDURE — 73100 X-RAY EXAM OF WRIST: CPT

## 2023-08-08 PROCEDURE — 73090 X-RAY EXAM OF FOREARM: CPT

## 2023-08-08 PROCEDURE — 96374 THER/PROPH/DIAG INJ IV PUSH: CPT

## 2023-08-08 PROCEDURE — 2500000003 HC RX 250 WO HCPCS: Performed by: PHYSICIAN ASSISTANT

## 2023-08-08 PROCEDURE — 99284 EMERGENCY DEPT VISIT MOD MDM: CPT

## 2023-08-08 PROCEDURE — 73110 X-RAY EXAM OF WRIST: CPT

## 2023-08-08 PROCEDURE — 25505 CLTX RDL SHFT FX W/MNPJ: CPT | Performed by: PHYSICIAN ASSISTANT

## 2023-08-08 RX ORDER — HYDROCODONE BITARTRATE AND ACETAMINOPHEN 5; 325 MG/1; MG/1
1 TABLET ORAL EVERY 4 HOURS PRN
Qty: 18 TABLET | Refills: 0 | Status: CANCELLED | OUTPATIENT
Start: 2023-08-08 | End: 2023-08-11

## 2023-08-08 RX ORDER — ACETAMINOPHEN 325 MG/1
650 TABLET ORAL EVERY 4 HOURS PRN
Status: DISCONTINUED | OUTPATIENT
Start: 2023-08-08 | End: 2023-08-08 | Stop reason: HOSPADM

## 2023-08-08 RX ORDER — DIAZEPAM 5 MG/ML
5 INJECTION, SOLUTION INTRAMUSCULAR; INTRAVENOUS ONCE
Status: COMPLETED | OUTPATIENT
Start: 2023-08-08 | End: 2023-08-08

## 2023-08-08 RX ORDER — LIDOCAINE HYDROCHLORIDE 10 MG/ML
10 INJECTION, SOLUTION EPIDURAL; INFILTRATION; INTRACAUDAL; PERINEURAL ONCE
Status: COMPLETED | OUTPATIENT
Start: 2023-08-08 | End: 2023-08-08

## 2023-08-08 RX ORDER — HYDROCODONE BITARTRATE AND ACETAMINOPHEN 5; 325 MG/1; MG/1
1 TABLET ORAL EVERY 4 HOURS PRN
Qty: 18 TABLET | Refills: 0 | Status: SHIPPED | OUTPATIENT
Start: 2023-08-08 | End: 2023-08-11

## 2023-08-08 RX ADMIN — LIDOCAINE HYDROCHLORIDE 10 ML: 10 INJECTION, SOLUTION EPIDURAL; INFILTRATION; INTRACAUDAL; PERINEURAL at 12:55

## 2023-08-08 RX ADMIN — ACETAMINOPHEN 650 MG: 325 TABLET ORAL at 11:44

## 2023-08-08 RX ADMIN — DIAZEPAM 2.5 MG: 5 INJECTION, SOLUTION INTRAMUSCULAR; INTRAVENOUS at 13:09

## 2023-08-08 ASSESSMENT — ENCOUNTER SYMPTOMS
NAUSEA: 0
STRIDOR: 0
SINUS PAIN: 0
TROUBLE SWALLOWING: 0
RHINORRHEA: 0
COLOR CHANGE: 0
SORE THROAT: 0
DIARRHEA: 0
SINUS PRESSURE: 0
BACK PAIN: 0
SHORTNESS OF BREATH: 0
VOMITING: 0
BLOOD IN STOOL: 0
COUGH: 0
CHEST TIGHTNESS: 0
EYES NEGATIVE: 1
WHEEZING: 0
ABDOMINAL PAIN: 0

## 2023-08-08 ASSESSMENT — PAIN DESCRIPTION - DESCRIPTORS
DESCRIPTORS: ACHING;SHARP
DESCRIPTORS: ACHING;SHARP

## 2023-08-08 ASSESSMENT — PAIN - FUNCTIONAL ASSESSMENT: PAIN_FUNCTIONAL_ASSESSMENT: 0-10

## 2023-08-08 ASSESSMENT — PAIN DESCRIPTION - LOCATION
LOCATION: ARM
LOCATION: ARM

## 2023-08-08 ASSESSMENT — PAIN SCALES - GENERAL
PAINLEVEL_OUTOF10: 6
PAINLEVEL_OUTOF10: 5
PAINLEVEL_OUTOF10: 5

## 2023-08-08 ASSESSMENT — PAIN DESCRIPTION - ORIENTATION
ORIENTATION: LEFT
ORIENTATION: LEFT

## 2023-08-08 NOTE — ED TRIAGE NOTES
Patient arrived via EMS, while trying to put on her slip on shoes and slipped on floor and fell. Left wrist swollen + deformity.  Denies LOC

## 2023-08-08 NOTE — DISCHARGE INSTRUCTIONS
Keep the arm elevated with some ice compresses several times daily. Medication for pain as needed and follow-up with orthopedics as has been discussed with the orthopedic surgeon. He is seeing Dr. Lesly Joiner whose information is included sometime this week. Call the office today and make an appointment to be sure you let them know that she was seen in the ED here.

## 2023-08-08 NOTE — ED PROVIDER NOTES
[facility] EMERGENCY DEP  EMERGENCY DEPARTMENT ENCOUNTER      Pt Name: Myra Strong  MRN: [id number]  [address] 0/34/[address]  Date of evaluation: 8/8/2023  Provider: Francie Cottrell MD    CHIEF COMPLAINT     No chief complaint on file. HISTORY OF PRESENT ILLNESS    HPI  This is a 71-year-old female with a history of type 2 diabetes, knee replacement and followed orthopedically by Dr. Saskia Hameed with [facility]. She states that she tripped and fell this morning and landed on the left arm. Her complaint is pain in the wrist from the elbow down. There is some tingling in the wrist.  She denies injury to any other part of her body. There was no loss of consciousness and clearly tripped as cause of her fall. She voices no other acute complaint. She arrives via EMS with the deformity in the wrist that is currently splinted. Review of External Medical Records:     Nursing Notes were reviewed. REVIEW OF SYSTEMS       Review of Systems   Constitutional:  Negative for activity change, chills, fatigue and fever. HENT:  Negative for congestion, ear pain, rhinorrhea, sinus pressure, sinus pain, sore throat and trouble swallowing. Eyes: Negative. Respiratory:  Negative for cough, chest tightness, shortness of breath, wheezing and stridor. Cardiovascular:  Negative for chest pain, palpitations and leg swelling. Gastrointestinal:  Negative for abdominal pain, blood in stool, diarrhea, nausea and vomiting. Endocrine: Negative. Genitourinary:  Negative for decreased urine volume, difficulty urinating, flank pain, frequency and hematuria. Musculoskeletal:  Positive for joint swelling. Negative for back pain and neck pain. See HPI   Skin:  Negative for color change, pallor and rash. Neurological:  Negative for dizziness, syncope, speech difficulty, weakness, numbness and headaches. Psychiatric/Behavioral:  Negative for agitation and behavioral problems.             PAST MEDICAL

## 2023-08-08 NOTE — PROCEDURES
PROCEDURE NOTE - FRACTURE REDUCTION: The patient was found to have a comminuted left distal radius fracture with dorsal displacement. Fracture indicated for closed reduction and splinting. Risks and benefits of procedure were discussed with patient who verbalized agreement to proceed. Patient was given a hematoma block using 10 cc of 1% lidocaine. Hand was then hung in finger traps with an additional approximately 10 pounds of countertraction and allowed to hang in traction for approximately 20 minutes. Sugar-tong splint was then applied and molded into a position of relative wrist flexion. Post procedure imaging was obtained and found to have an incomplete but acceptable reduction of the fracture. Patient placed in sling for elevation and comfort. Patient tolerated procedure well.     Rohan Art

## 2023-08-08 NOTE — ED NOTES
Pt having difficulty relaxing her arm after nerve block by Ortho PA. PA requesting valium to relax patient.  Spoke with Dr Tommye Boast and verbal order for valium IV received     Felicia Bennett RN  08/08/23 3579

## 2023-12-27 ENCOUNTER — TRANSCRIBE ORDERS (OUTPATIENT)
Facility: HOSPITAL | Age: 78
End: 2023-12-27

## 2023-12-27 DIAGNOSIS — Z12.31 VISIT FOR SCREENING MAMMOGRAM: Primary | ICD-10-CM

## 2024-01-12 ENCOUNTER — HOSPITAL ENCOUNTER (OUTPATIENT)
Facility: HOSPITAL | Age: 79
End: 2024-01-12
Payer: MEDICARE

## 2024-01-12 VITALS — WEIGHT: 195 LBS | BODY MASS INDEX: 33.29 KG/M2 | HEIGHT: 64 IN

## 2024-01-12 DIAGNOSIS — Z12.31 VISIT FOR SCREENING MAMMOGRAM: ICD-10-CM

## 2024-01-12 PROCEDURE — 77063 BREAST TOMOSYNTHESIS BI: CPT

## 2024-05-24 RX ORDER — LISINOPRIL 40 MG/1
40 TABLET ORAL EVERY MORNING
Qty: 90 TABLET | OUTPATIENT
Start: 2024-05-24

## 2024-05-24 NOTE — TELEPHONE ENCOUNTER
Last appt 6/22/2022      Next Apt:     None       [id number] - [place], [place] - [address] -  [phone] - F [phone]  [address] [facility] [address] [address]  Phone: [phone] Fax: [phone]

## 2024-08-22 ENCOUNTER — OFFICE VISIT (OUTPATIENT)
Age: 79
End: 2024-08-22
Payer: MEDICARE

## 2024-08-22 VITALS — HEIGHT: 64 IN | WEIGHT: 205 LBS | BODY MASS INDEX: 35 KG/M2

## 2024-08-22 DIAGNOSIS — E78.2 MIXED HYPERLIPIDEMIA: ICD-10-CM

## 2024-08-22 DIAGNOSIS — E11.65 TYPE 2 DIABETES MELLITUS WITH HYPERGLYCEMIA, WITHOUT LONG-TERM CURRENT USE OF INSULIN (HCC): Primary | ICD-10-CM

## 2024-08-22 DIAGNOSIS — I10 PRIMARY HYPERTENSION: ICD-10-CM

## 2024-08-22 DIAGNOSIS — E03.9 ACQUIRED HYPOTHYROIDISM: ICD-10-CM

## 2024-08-22 PROCEDURE — 1036F TOBACCO NON-USER: CPT | Performed by: INTERNAL MEDICINE

## 2024-08-22 PROCEDURE — G2211 COMPLEX E/M VISIT ADD ON: HCPCS | Performed by: INTERNAL MEDICINE

## 2024-08-22 PROCEDURE — 99204 OFFICE O/P NEW MOD 45 MIN: CPT | Performed by: INTERNAL MEDICINE

## 2024-08-22 PROCEDURE — 1123F ACP DISCUSS/DSCN MKR DOCD: CPT | Performed by: INTERNAL MEDICINE

## 2024-08-22 PROCEDURE — 1090F PRES/ABSN URINE INCON ASSESS: CPT | Performed by: INTERNAL MEDICINE

## 2024-08-22 PROCEDURE — G8428 CUR MEDS NOT DOCUMENT: HCPCS | Performed by: INTERNAL MEDICINE

## 2024-08-22 PROCEDURE — G8417 CALC BMI ABV UP PARAM F/U: HCPCS | Performed by: INTERNAL MEDICINE

## 2024-08-22 PROCEDURE — G8399 PT W/DXA RESULTS DOCUMENT: HCPCS | Performed by: INTERNAL MEDICINE

## 2024-08-22 RX ORDER — ACETAMINOPHEN 325 MG/1
TABLET ORAL EVERY 4 HOURS
COMMUNITY

## 2024-08-22 RX ORDER — LEVOTHYROXINE SODIUM 112 UG/1
TABLET ORAL
Qty: 90 TABLET | Refills: 3 | Status: SHIPPED | OUTPATIENT
Start: 2024-08-22

## 2024-08-22 RX ORDER — BLOOD-GLUCOSE METER
KIT MISCELLANEOUS
Qty: 1 KIT | Refills: 0 | Status: SHIPPED | OUTPATIENT
Start: 2024-08-22

## 2024-08-22 RX ORDER — GLUCOSAMINE HCL/CHONDROITIN SU 500-400 MG
CAPSULE ORAL
Qty: 100 STRIP | Refills: 1 | Status: SHIPPED | OUTPATIENT
Start: 2024-08-22

## 2024-08-22 RX ORDER — LANCETS 30 GAUGE
EACH MISCELLANEOUS
Qty: 100 EACH | Refills: 1 | Status: SHIPPED | OUTPATIENT
Start: 2024-08-22

## 2024-08-22 NOTE — PATIENT INSTRUCTIONS
Glucometer Rx sent in so can check sugar once a week - want under 200    Referral was made to [facility] ([phone]) for more education    Continue the metformin - same dose    Change thyroid medication to Unithroid brand; if more than $20, use the [employer] coupon I gave you

## 2024-08-22 NOTE — PROGRESS NOTES
PCP     4. Mixed hyperlipidemia  On statin per PCP      .     Orders Placed This Encounter   Procedures    Ambulatory referral to Diabetic Education     Referral Priority:   Routine     Referral Type:   Eval and Treat     Referral Reason:   Specialty Services Required     Number of Visits Requested:   1      Orders Placed This Encounter   Medications    blood glucose monitor strips     Sig: Test 1 times a day.  Dispense brand preferred by insurance.     Dispense:  100 strip     Refill:  1    Lancets MISC     Sig: Use to check sugars 1 times a day     Dispense:  100 each     Refill:  1    glucose monitoring kit     Sig: Use as directed to check sugars; dispense brand covered by insurance     Dispense:  1 kit     Refill:  0    UNITHROID 112 MCG tablet     Sig: One tablet by mouth once a day.  Take in AM with water and wait 30-60 minutes before other drink, food or meds.  Take calcium, iron and multi-vitamins 4 hours apart.     Dispense:  90 tablet     Refill:  3        Return in about 6 months (around 2/22/2025). - if stable, d/c back to PCP

## 2024-08-27 ENCOUNTER — TELEPHONE (OUTPATIENT)
Age: 79
End: 2024-08-27

## 2024-08-27 NOTE — TELEPHONE ENCOUNTER
Pt called and LVM 8/27 @ 9:41 am.    Pt stated that Dr Villanueva sent in an rx for lancets at [facility] pharmacy. Her insurance will not cover them. The pharmacy would like to call her insurance company and let them know she needs them.    Pt# [phone]

## 2024-08-27 NOTE — TELEPHONE ENCOUNTER
I returned this call and let Ms. Maria know that we sent in the script saying \"use brand preferred by insurance\" but they did the Freestyle and that is not covered by the insurance. I then call the pharmacy to tell them that per the insurance to run AccuChek. They did that and everything went through. I let Ms. Maria know this.  Halle

## 2024-09-11 ENCOUNTER — OFFICE VISIT (OUTPATIENT)
Age: 79
End: 2024-09-11

## 2024-09-11 DIAGNOSIS — E11.65 TYPE 2 DIABETES MELLITUS WITH HYPERGLYCEMIA, WITHOUT LONG-TERM CURRENT USE OF INSULIN (HCC): Primary | ICD-10-CM

## 2024-09-24 ENCOUNTER — OFFICE VISIT (OUTPATIENT)
Age: 79
End: 2024-09-24
Payer: MEDICARE

## 2024-09-24 DIAGNOSIS — E11.65 TYPE 2 DIABETES MELLITUS WITH HYPERGLYCEMIA, WITHOUT LONG-TERM CURRENT USE OF INSULIN (HCC): Primary | ICD-10-CM

## 2024-09-24 PROCEDURE — G0108 DIAB MANAGE TRN  PER INDIV: HCPCS | Performed by: DIETITIAN, REGISTERED

## 2024-10-01 ENCOUNTER — NURSE ONLY (OUTPATIENT)
Age: 79
End: 2024-10-01
Payer: MEDICARE

## 2024-10-01 DIAGNOSIS — E11.65 TYPE 2 DIABETES MELLITUS WITH HYPERGLYCEMIA, WITHOUT LONG-TERM CURRENT USE OF INSULIN (HCC): Primary | ICD-10-CM

## 2024-10-01 PROCEDURE — G0109 DIAB MANAGE TRN IND/GROUP: HCPCS

## 2024-10-08 ENCOUNTER — NURSE ONLY (OUTPATIENT)
Age: 79
End: 2024-10-08
Payer: MEDICARE

## 2024-10-08 DIAGNOSIS — E11.65 TYPE 2 DIABETES MELLITUS WITH HYPERGLYCEMIA, WITHOUT LONG-TERM CURRENT USE OF INSULIN (HCC): Primary | ICD-10-CM

## 2024-10-08 PROCEDURE — G0109 DIAB MANAGE TRN IND/GROUP: HCPCS | Performed by: DIETITIAN, REGISTERED

## 2024-10-08 NOTE — PROGRESS NOTES
Prevention   Vaccinations   Preconception care (if applicable)  Examinations   Eye    Foot   Diabetic complications' prevention   Dental health   Heart health   Kidney Health   Nerve health   Sleep health      The participant has a personal diabetes care record to keep abreast of diabetes health Yes     The participant needs to address : fill diabetes care record..         Shawna Zuñiga RN on 10/7/2024 at 10:09 PM    I have personally reviewed the health record, including provider notes, laboratory data and current medications before making these care and education recommendations. The time spent in this effort is included in the total time.  Total minutes: 120      Miriam Maria, was evaluated in person at office visit.     Shawna Zuñiga RN [facility]  Certified Diabetes and    [facility]

## 2024-10-08 NOTE — PROGRESS NOTES
[facility]  Diabetes Self-Management Education & Support Program  Encounter Note      SUMMARY  Diabetes self-care management training was completed related to healthy eating and monitoring. The participant will return on October 15 to continue DSMES related to taking medications and physical activity. The participant did not identify SMART Goal(s) and will practice knowledge and skills related to reducing risks and healthy eating and monitoring to improve diabetes self-management.      EVALUATION:  Norma was seed for DM education in class today - instruction was provided by myself and Gareth LEYVA [facility]. Norma was attentive in class - asked questions and shared experiences. She notes that she needs to read through materials at home and anticipates having questions in the future.     RECOMMENDATIONS:  Bring your BG logs to class next week and we can review together.  Implement 3 meals daily and plan for about 3 servings of carbohydrate foods with each meal.      TOPICS DISCUSSED TODAY:  WHAT CAN I EAT? 60  HOW CAN BLOOD GLUCOSE MONITORING HELP ME? 60      Next provider visit is scheduled for 2/24/25       DATE DSMES TOPIC EVALUATION     10/8/2024 WHAT CAN I EAT?   General principles   Determining a healthy weight   Nutritional terms & tools   Healthy Plate method   Carbohydrate Counting   Reading food labels   Free apps        The participant   Uses Healthy Plate principles in constructing meals: Yes  Reads food labels in choosing acceptable foods: Yes    The participant needs to address taking time to review materials and meal planning guidelines reviewed today. She shared that her son's been helping with meal planning but also notices differences in the guidelines and portions we presented..     DATE DSMES TOPIC EVALUATION     10/8/2024 HOW CAN BLOOD GLUCOSE MONITORING HELP ME?   Value of blood glucose monitoring   Realistic expectations   Blood glucose monitoring targets   Target adjustments

## 2024-10-15 ENCOUNTER — NURSE ONLY (OUTPATIENT)
Age: 79
End: 2024-10-15
Payer: MEDICARE

## 2024-10-15 DIAGNOSIS — E11.65 TYPE 2 DIABETES MELLITUS WITH HYPERGLYCEMIA, WITHOUT LONG-TERM CURRENT USE OF INSULIN (HCC): Primary | ICD-10-CM

## 2024-10-15 PROCEDURE — G0109 DIAB MANAGE TRN IND/GROUP: HCPCS | Performed by: DIETITIAN, REGISTERED

## 2024-10-15 NOTE — PROGRESS NOTES
[facility]  Diabetes Self-Management Education & Support Program  Encounter Note      SUMMARY  Diabetes self-care management training was completed related to taking medications and physical activity. The participant will return on October 22 to continue DSMES related to healthy coping and problem solving. The participant did not identify SMART Goal(s) today and will practice knowledge and skills related to reducing risks, healthy eating and monitoring, and being active and medications to improve diabetes self-management.        EVALUATION:  Norma is seen in class environment for education today. She needed to leave at 10:00 am due to limited access to transportation for class attendance. We will need to make up missed topics in individual visit in future. Reports improving her activity levels and duration since starting her class series.    RECOMMENDATIONS:  Stay on track with medication regimen  Continue to walk as able and weather permits  Schedule individual visit to complete her activity section of diabetes education.     TOPICS DISCUSSED TODAY:  HOW DO MY DIABETES MEDICATIONS WORK? 60  HOW DOES PHYSICAL ACTIVITY AFFECT MY DIABETES? 30      Next provider visit is scheduled for 2/24/25       DATE DSMES TOPIC EVALUATION     10/15/2024 HOW DO MY DIABETES MEDICATIONS WORK?   Type 1 medications & methods   Insulin injections   Injection sites   Type 2 medications   Oral agents   GLP-1 agonists   Hypoglycemia symptoms & treatment   Glucagon emergency kits   General guidance regarding insulin use whether Type 1, 2 or gestational diabetes   Storage of insulin   Disposal    Traveling with medications   Barriers to medication adherence      The participant   Can describe the expected action & side effects of prescribed diabetes medications: Yes  Can demonstrate injection technique (if applicable): No - not applicable but was demonstrated by educator in class.    The participant needs to address

## 2024-10-22 ENCOUNTER — NURSE ONLY (OUTPATIENT)
Age: 79
End: 2024-10-22
Payer: MEDICARE

## 2024-10-22 DIAGNOSIS — E11.65 TYPE 2 DIABETES MELLITUS WITH HYPERGLYCEMIA, WITHOUT LONG-TERM CURRENT USE OF INSULIN (HCC): Primary | ICD-10-CM

## 2024-10-22 PROCEDURE — G0109 DIAB MANAGE TRN IND/GROUP: HCPCS | Performed by: DIETITIAN, REGISTERED

## 2024-10-22 NOTE — PROGRESS NOTES
[facility]  Diabetes Self-Management Education & Support Program  Encounter Note      SUMMARY  Diabetes self-care management training was completed related to healthy coping and problem solving.T he participant will return on November 25 to continue DSMES related to post program assessment. The participant did identify SMART Goal(s) and will practice knowledge and skills related to reducing risks, healthy eating and monitoring, being active and medications, and healthy coping and problem solving to improve diabetes self-management.        EVALUATION:  Norma is been seen in class for her DM education - instruction was provided with Gaerth [facility]. Norma shared that she has been walking more often and has been working towards her plan. Norma shared her experiences in class and asked appropriate questions related to topics presented.     RECOMMENDATIONS:  1) continue to work on healthy coping   2) have a plan for hypoglycemia and disaster preparation review is encouraged     TOPICS DISCUSSED TODAY:  HOW DO I FIND SUPPORT TO TACKLE THIS CONDITION? 60  HOW DO I FIGURE OUT WHAT'S INFLUENCING MY BLOOD GLUCOSES? 60    Next provider visit is scheduled for 2/24/24       SMART GOAL(S)   Increase my physical activity by walking 30 minutes at least 3 times a week.  ACHIEVEMENT OF GOAL(S) : 0-24% - set 10/7/24         DATE DSMES TOPIC EVALUATION     10/22/2024 HOW DO I FIND SUPPORT TO TACKLE THIS CONDITION?   Normal responses to diabetes diagnosis or complication   Shock   Anger & resentment   Guilt/self-blame   Sadness & worry   Depression    Anxiety   Pregnancy   Constructive strategies to normal responses    Exploring feelings & attitudes   Motivation: Cost versus benefits analysis   Problem-solving: Chain analysis   Obtaining support: Communicating   Family & friends   Health team   iii. Community   Stress   Symptoms   Managing stress   Fill your tool kit        The participant can identify people that

## 2025-01-10 ENCOUNTER — TELEMEDICINE (OUTPATIENT)
Age: 80
End: 2025-01-10
Payer: MEDICARE

## 2025-01-10 DIAGNOSIS — E11.65 TYPE 2 DIABETES MELLITUS WITH HYPERGLYCEMIA, WITHOUT LONG-TERM CURRENT USE OF INSULIN (HCC): Primary | ICD-10-CM

## 2025-01-10 PROCEDURE — G0108 DIAB MANAGE TRN  PER INDIV: HCPCS | Performed by: DIETITIAN, REGISTERED

## 2025-01-10 NOTE — PROGRESS NOTES
[facility]  Diabetes Self-Management Education & Support Program  Post-program Evaluation    EVALUATION @ COMPLETION OF THE PROGRAM    Miriam Maria completed 9.5 hours of diabetes self-management education. The participant acquired new knowledge and demonstrated new skills during the program.     The participant worked on the following SMART GOAL(s) to improve their diabetes self-management:    Will walk 3 times per week , inside or outside.   ACHIEVEMENT OF GOAL(S) : %    The participant's pre-program A1c was   Lab Results   Component Value Date/Time    OWO6VWAF 6.6 03/25/2022 09:46 AM    LABA1C 6.7 06/22/2022 09:38 AM   . The post-evaluation A1c is 6.8 9/28/24.    The participant improved their Diabetes Skills, Confidence and Preparedness Index (scored out of 7):    Total score: 5.3  Skills: 5.11  Confidence: 4.86  Preparedness: 5.86    FINAL RECOMMENDATIONS:  Pt requests more assistance with meal planning.  She found the class environment was hard to stay \"up to speed with the others\" and it took more time for understanding/comprehending. She and I will meet for MNT as ordered by 's referral 8/22/24.     Next provider visit is scheduled for 2/24/24 will see JERMAN ROBIN 1/30/25 for MNT.       ANTWON GUTIERREZ RD, SHARDA on 1/10/2025     Metric Patient's responses (1/10/2025) Areas for improvement     Healthy Eating       The participant is using the Healthy Plate to control carbohydrate intake - No    The participant reads food labels accurately - No     Pt requests additional education with meal planning. Will start MNT education at follow up visit.     Being Active       The participant performs physical activity where your heart beats faster and your breathing is harder than normal for 30 minutes or more? 0 day(s)     In a typical week, the participant performs physical activity 7 day(s)   Trying to walk around the house and stairs during inclement

## 2025-01-21 DIAGNOSIS — E11.65 TYPE 2 DIABETES MELLITUS WITH HYPERGLYCEMIA, WITHOUT LONG-TERM CURRENT USE OF INSULIN (HCC): ICD-10-CM

## 2025-01-21 RX ORDER — BLOOD SUGAR DIAGNOSTIC
STRIP MISCELLANEOUS
Qty: 100 STRIP | Refills: 1 | Status: SHIPPED | OUTPATIENT
Start: 2025-01-21

## 2025-01-30 ENCOUNTER — OFFICE VISIT (OUTPATIENT)
Age: 80
End: 2025-01-30

## 2025-01-30 DIAGNOSIS — E11.65 TYPE 2 DIABETES MELLITUS WITH HYPERGLYCEMIA, WITHOUT LONG-TERM CURRENT USE OF INSULIN (HCC): Primary | ICD-10-CM

## 2025-01-30 NOTE — PROGRESS NOTES
25 MCG (1000 UT) CAPS, Take by mouth every evening, Disp: , Rfl:     clonazePAM (KLONOPIN) 0.5 MG tablet, TAKE 1 TABLET EVERY NIGHT . MAX DAILY AMOUNT: 0.5 MG., Disp: , Rfl:     escitalopram (LEXAPRO) 20 MG tablet, TAKE 1 TABLET EVERY EVENING, Disp: , Rfl:     famotidine (PEPCID) 20 MG tablet, Take by mouth, Disp: , Rfl:     hydroCHLOROthiazide (HYDRODIURIL) 12.5 MG tablet, TAKE 1 TABLET EVERY EVENING, Disp: , Rfl:     lisinopril (PRINIVIL;ZESTRIL) 40 MG tablet, TAKE 1 TABLET EVERY MORNING, Disp: , Rfl:     metFORMIN, MOD, (GLUMETZA) 500 MG extended release tablet, Take by mouth every evening, Disp: , Rfl:     montelukast (SINGULAIR) 10 MG tablet, Take by mouth, Disp: , Rfl:     nystatin-triamcinolone (MYCOLOG) 605010-4.1 UNIT/GM-% ointment, Apply topically 2 times daily, Disp: , Rfl:     traZODone (DESYREL) 50 MG tablet, TAKE 1 TABLET EVERY NIGHT, Disp: , Rfl:    Biochemical Data:   Hemoglobin A1C   Date Value Ref Range Status   06/22/2022 6.7 (H) 4.8 - 5.6 % Final     Comment:              Prediabetes: 5.7 - 6.4           Diabetes: >6.4           Glycemic control for adults with diabetes: <7.0       Lab Results   Component Value Date     06/22/2022    K 5.0 06/22/2022    CL 98 06/22/2022    CO2 25 06/22/2022    BUN 12 06/22/2022    CREATININE 0.81 06/22/2022    GLUCOSE 139 (H) 06/22/2022    CALCIUM 10.4 (H) 06/22/2022    BILITOT 0.3 06/22/2022    ALKPHOS 107 06/22/2022    AST 19 06/22/2022    ALT 26 06/22/2022    LABGLOM 74 06/22/2022    GFRAA 97 11/24/2021    AGRATIO 1.9 06/22/2022         Lab Results   Component Value Date    CHOL 147 06/22/2022    TRIG 71 06/22/2022    HDL 83 06/22/2022    LDL 50 06/22/2022    VLDL 14 06/22/2022         Food & Nutrition: 24 hour recall: we did not complete diet recall as patient was recently seen for post diabetes education assessment - using this tool she reported that she does not understand nor confident with her meal planning. Therefore, we met and reviewed in depth

## 2025-03-12 DIAGNOSIS — E11.65 TYPE 2 DIABETES MELLITUS WITH HYPERGLYCEMIA, WITHOUT LONG-TERM CURRENT USE OF INSULIN (HCC): ICD-10-CM

## 2025-03-12 RX ORDER — AVOBENZONE, HOMOSALATE, OCTISALATE, OCTOCRYLENE 30; 40; 45; 26 MG/ML; MG/ML; MG/ML; MG/ML
CREAM TOPICAL
Qty: 100 EACH | Refills: 3 | Status: SHIPPED | OUTPATIENT
Start: 2025-03-12

## 2025-05-12 ENCOUNTER — OFFICE VISIT (OUTPATIENT)
Age: 80
End: 2025-05-12
Payer: MEDICARE

## 2025-05-12 VITALS — BODY MASS INDEX: 33.63 KG/M2 | WEIGHT: 197 LBS | HEIGHT: 64 IN

## 2025-05-12 DIAGNOSIS — E03.9 ACQUIRED HYPOTHYROIDISM: ICD-10-CM

## 2025-05-12 DIAGNOSIS — I10 PRIMARY HYPERTENSION: ICD-10-CM

## 2025-05-12 DIAGNOSIS — E78.2 MIXED HYPERLIPIDEMIA: ICD-10-CM

## 2025-05-12 DIAGNOSIS — E11.65 TYPE 2 DIABETES MELLITUS WITH HYPERGLYCEMIA, WITHOUT LONG-TERM CURRENT USE OF INSULIN (HCC): Primary | ICD-10-CM

## 2025-05-12 PROCEDURE — 99214 OFFICE O/P EST MOD 30 MIN: CPT | Performed by: INTERNAL MEDICINE

## 2025-05-12 PROCEDURE — G8417 CALC BMI ABV UP PARAM F/U: HCPCS | Performed by: INTERNAL MEDICINE

## 2025-05-12 PROCEDURE — G8428 CUR MEDS NOT DOCUMENT: HCPCS | Performed by: INTERNAL MEDICINE

## 2025-05-12 PROCEDURE — 1036F TOBACCO NON-USER: CPT | Performed by: INTERNAL MEDICINE

## 2025-05-12 PROCEDURE — 1090F PRES/ABSN URINE INCON ASSESS: CPT | Performed by: INTERNAL MEDICINE

## 2025-05-12 PROCEDURE — G8399 PT W/DXA RESULTS DOCUMENT: HCPCS | Performed by: INTERNAL MEDICINE

## 2025-05-12 PROCEDURE — G2211 COMPLEX E/M VISIT ADD ON: HCPCS | Performed by: INTERNAL MEDICINE

## 2025-05-12 PROCEDURE — 1123F ACP DISCUSS/DSCN MKR DOCD: CPT | Performed by: INTERNAL MEDICINE

## 2025-05-12 RX ORDER — BUSPIRONE HYDROCHLORIDE 10 MG/1
10 TABLET ORAL 2 TIMES DAILY
COMMUNITY
Start: 2025-04-06
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
## 2025-05-26 DIAGNOSIS — E03.9 ACQUIRED HYPOTHYROIDISM: ICD-10-CM

## 2025-05-27 RX ORDER — LEVOTHYROXINE SODIUM 112 UG/1
TABLET ORAL
Qty: 90 TABLET | Refills: 0 | Status: SHIPPED | OUTPATIENT
Start: 2025-05-27

## 2025-06-10 DIAGNOSIS — E03.9 ACQUIRED HYPOTHYROIDISM: ICD-10-CM

## 2025-06-11 RX ORDER — LEVOTHYROXINE SODIUM 112 UG/1
TABLET ORAL
Qty: 90 TABLET | Refills: 0 | Status: SHIPPED | OUTPATIENT
Start: 2025-06-11

## 2025-08-08 ENCOUNTER — OFFICE VISIT (OUTPATIENT)
Age: 80
End: 2025-08-08
Payer: MEDICARE

## 2025-08-08 VITALS
SYSTOLIC BLOOD PRESSURE: 140 MMHG | WEIGHT: 197.2 LBS | HEART RATE: 63 BPM | TEMPERATURE: 98.4 F | DIASTOLIC BLOOD PRESSURE: 86 MMHG | BODY MASS INDEX: 34.94 KG/M2 | HEIGHT: 63 IN | RESPIRATION RATE: 16 BRPM | OXYGEN SATURATION: 95 %

## 2025-08-08 DIAGNOSIS — Z00.00 ROUTINE GENERAL MEDICAL EXAMINATION AT A HEALTH CARE FACILITY: Primary | ICD-10-CM

## 2025-08-08 DIAGNOSIS — F41.9 CHRONIC ANXIETY: ICD-10-CM

## 2025-08-08 DIAGNOSIS — I10 PRIMARY HYPERTENSION: ICD-10-CM

## 2025-08-08 DIAGNOSIS — E78.5 DYSLIPIDEMIA: ICD-10-CM

## 2025-08-08 DIAGNOSIS — E11.9 TYPE 2 DIABETES MELLITUS WITHOUT COMPLICATION, WITHOUT LONG-TERM CURRENT USE OF INSULIN (HCC): ICD-10-CM

## 2025-08-08 DIAGNOSIS — Z00.00 ROUTINE GENERAL MEDICAL EXAMINATION AT A HEALTH CARE FACILITY: ICD-10-CM

## 2025-08-08 DIAGNOSIS — E03.9 ACQUIRED HYPOTHYROIDISM: ICD-10-CM

## 2025-08-08 LAB
ALBUMIN SERPL-MCNC: 4.2 G/DL (ref 3.5–5.2)
ALBUMIN/GLOB SERPL: 1.4 (ref 1.1–2.2)
ALP SERPL-CCNC: 118 U/L (ref 35–104)
ALT SERPL-CCNC: 20 U/L (ref 10–35)
ANION GAP SERPL CALC-SCNC: 15 MMOL/L (ref 2–14)
AST SERPL-CCNC: 18 U/L (ref 10–35)
BASOPHILS # BLD: 0.06 K/UL (ref 0–0.1)
BASOPHILS NFR BLD: 0.8 % (ref 0–1)
BILIRUB SERPL-MCNC: 0.4 MG/DL (ref 0–1.2)
BUN SERPL-MCNC: 11 MG/DL (ref 8–23)
BUN/CREAT SERPL: 20 (ref 12–20)
CALCIUM SERPL-MCNC: 9.9 MG/DL (ref 8.8–10.2)
CHLORIDE SERPL-SCNC: 99 MMOL/L (ref 98–107)
CHOLEST SERPL-MCNC: 135 MG/DL (ref 0–200)
CO2 SERPL-SCNC: 24 MMOL/L (ref 20–29)
CREAT SERPL-MCNC: 0.56 MG/DL (ref 0.6–1)
CREAT UR-MCNC: 66.7 MG/DL (ref 28–217)
DIFFERENTIAL METHOD BLD: NORMAL
EOSINOPHIL # BLD: 0.11 K/UL (ref 0–0.4)
EOSINOPHIL NFR BLD: 1.5 % (ref 0–7)
ERYTHROCYTE [DISTWIDTH] IN BLOOD BY AUTOMATED COUNT: 13 % (ref 11.5–14.5)
EST. AVERAGE GLUCOSE BLD GHB EST-MCNC: 141 MG/DL
GLOBULIN SER CALC-MCNC: 3 G/DL (ref 2–4)
GLUCOSE SERPL-MCNC: 122 MG/DL (ref 65–100)
HBA1C MFR BLD: 6.5 % (ref 4–5.6)
HCT VFR BLD AUTO: 42.5 % (ref 35–47)
HDLC SERPL-MCNC: 77 MG/DL (ref 40–60)
HDLC SERPL: 1.8 (ref 0–5)
HGB BLD-MCNC: 13.9 G/DL (ref 11.5–16)
IMM GRANULOCYTES # BLD AUTO: 0.01 K/UL (ref 0–0.04)
IMM GRANULOCYTES NFR BLD AUTO: 0.1 % (ref 0–0.5)
LDLC SERPL CALC-MCNC: 45 MG/DL (ref 0–100)
LYMPHOCYTES # BLD: 1.83 K/UL (ref 0.8–3.5)
LYMPHOCYTES NFR BLD: 25.2 % (ref 12–49)
MCH RBC QN AUTO: 29.2 PG (ref 26–34)
MCHC RBC AUTO-ENTMCNC: 32.7 G/DL (ref 30–36.5)
MCV RBC AUTO: 89.3 FL (ref 80–99)
MICROALBUMIN UR-MCNC: <1.2 MG/DL
MICROALBUMIN/CREAT UR-RTO: NORMAL MG/G
MONOCYTES # BLD: 0.5 K/UL (ref 0–1)
MONOCYTES NFR BLD: 6.9 % (ref 5–13)
NEUTS SEG # BLD: 4.74 K/UL (ref 1.8–8)
NEUTS SEG NFR BLD: 65.5 % (ref 32–75)
NRBC # BLD: 0 K/UL (ref 0–0.01)
NRBC BLD-RTO: 0 PER 100 WBC
PLATELET # BLD AUTO: 291 K/UL (ref 150–400)
PMV BLD AUTO: 10.5 FL (ref 8.9–12.9)
POTASSIUM SERPL-SCNC: 4.1 MMOL/L (ref 3.5–5.1)
PROT SERPL-MCNC: 7.2 G/DL (ref 6.4–8.3)
RBC # BLD AUTO: 4.76 M/UL (ref 3.8–5.2)
SODIUM SERPL-SCNC: 138 MMOL/L (ref 136–145)
SPECIMEN HOLD: NORMAL
T4 FREE SERPL-MCNC: 1.8 NG/DL (ref 0.9–1.6)
TRIGL SERPL-MCNC: 67 MG/DL (ref 0–150)
TSH, 3RD GENERATION: 0.81 UIU/ML (ref 0.27–4.2)
VLDLC SERPL CALC-MCNC: 13 MG/DL
WBC # BLD AUTO: 7.3 K/UL (ref 3.6–11)

## 2025-08-08 PROCEDURE — 99204 OFFICE O/P NEW MOD 45 MIN: CPT

## 2025-08-08 RX ORDER — ACETAMINOPHEN 160 MG
2000 TABLET,DISINTEGRATING ORAL DAILY
COMMUNITY

## 2025-08-08 RX ORDER — METFORMIN HYDROCHLORIDE 500 MG/1
1000 TABLET, EXTENDED RELEASE ORAL EVERY EVENING
COMMUNITY

## 2025-08-08 RX ORDER — BUSPIRONE HYDROCHLORIDE 15 MG/1
15 TABLET ORAL 2 TIMES DAILY
Qty: 180 TABLET | Refills: 1 | Status: SHIPPED | OUTPATIENT
Start: 2025-08-08

## 2025-08-08 SDOH — ECONOMIC STABILITY: FOOD INSECURITY: WITHIN THE PAST 12 MONTHS, YOU WORRIED THAT YOUR FOOD WOULD RUN OUT BEFORE YOU GOT MONEY TO BUY MORE.: NEVER TRUE

## 2025-08-08 SDOH — ECONOMIC STABILITY: FOOD INSECURITY: WITHIN THE PAST 12 MONTHS, THE FOOD YOU BOUGHT JUST DIDN'T LAST AND YOU DIDN'T HAVE MONEY TO GET MORE.: NEVER TRUE

## 2025-08-08 ASSESSMENT — ENCOUNTER SYMPTOMS
VOMITING: 0
CONSTIPATION: 0
COUGH: 0
CHEST TIGHTNESS: 0
SINUS PAIN: 0
BACK PAIN: 0
NAUSEA: 0
WHEEZING: 0
SHORTNESS OF BREATH: 0
DIARRHEA: 0
ABDOMINAL PAIN: 0
SORE THROAT: 0

## 2025-08-08 ASSESSMENT — PATIENT HEALTH QUESTIONNAIRE - PHQ9
1. LITTLE INTEREST OR PLEASURE IN DOING THINGS: SEVERAL DAYS
SUM OF ALL RESPONSES TO PHQ QUESTIONS 1-9: 2
2. FEELING DOWN, DEPRESSED OR HOPELESS: SEVERAL DAYS
SUM OF ALL RESPONSES TO PHQ QUESTIONS 1-9: 2

## (undated) DEVICE — GARMENT,MEDLINE,DVT,INT,CALF,MED, GEN2: Brand: MEDLINE

## (undated) DEVICE — TAPE,CLOTH/SILK,CURAD,3"X10YD,LF,40/CS: Brand: CURAD

## (undated) DEVICE — Device

## (undated) DEVICE — 4-PORT MANIFOLD: Brand: NEPTUNE 2

## (undated) DEVICE — Z DISCONTINUED USE 2744636  DRESSING AQUACEL 14 IN ALG W3.5XL14IN POLYUR FLM CVR W/ HYDRCOLL

## (undated) DEVICE — ATTUNE SOLO PINNING SYSTEM

## (undated) DEVICE — T4 HOOD

## (undated) DEVICE — SCRUB DRY SURG EZ SCRUB BRUSH PREOPERATIVE GRN

## (undated) DEVICE — STRYKER PERFORMANCE SERIES SAGITTAL BLADE: Brand: STRYKER PERFORMANCE SERIES

## (undated) DEVICE — SUTURE STRATAFIX SYMMETRIC PDS + SZ 1 L18IN ABSRB VLT L48MM SXPP1A400

## (undated) DEVICE — DRAPE,EXTREMITY,89X128,STERILE: Brand: MEDLINE

## (undated) DEVICE — STERILE POLYISOPRENE POWDER-FREE SURGICAL GLOVES WITH EMOLLIENT COATING: Brand: PROTEXIS

## (undated) DEVICE — REM POLYHESIVE ADULT PATIENT RETURN ELECTRODE: Brand: VALLEYLAB

## (undated) DEVICE — SOLUTION IRRIG 3000ML 0.9% SOD CHL USP UROMATIC PLAS CONT

## (undated) DEVICE — HANDPIECE SET WITH BONE CLEANING TIP AND SUCTION TUBE: Brand: INTERPULSE

## (undated) DEVICE — TOTAL TRAY, 16FR 10ML SIL FOLEY, URN: Brand: MEDLINE

## (undated) DEVICE — CONTAINER,SPECIMEN,3OZ,OR STRL: Brand: MEDLINE

## (undated) DEVICE — ZIMMER® STERILE DISPOSABLE TOURNIQUET CUFF WITH PLC, DUAL PORT, SINGLE BLADDER, 34 IN. (86 CM)

## (undated) DEVICE — CARTRIDGE BNE CEM MIX UNIV TWR VAC ROTOR BRK OFF NOZ W/O

## (undated) DEVICE — SUTURE VCRL SZ 2-0 L36IN ABSRB UD L40MM CT 1/2 CIR J957H

## (undated) DEVICE — SUTURE VCRL SZ 1 L36IN ABSRB UD L36MM CT-1 1/2 CIR J947H

## (undated) DEVICE — CUSTOM CAST PD STR

## (undated) DEVICE — STERILE POLYISOPRENE POWDER-FREE SURGICAL GLOVES: Brand: PROTEXIS

## (undated) DEVICE — PREP SKN CHLRAPRP APL 26ML STR --

## (undated) DEVICE — SUTURE VCRL SZ 2 L54IN ABSRB UD L65MM TP-1 1/2 CIR J880T

## (undated) DEVICE — SOLUTION IRRIG 3000ML 0.9% SOD CHL FLX CONT 0797208] ICU MEDICAL INC]

## (undated) DEVICE — SYR 20ML LL STRL LF --

## (undated) DEVICE — SOLUTION IRRIG 1000ML H2O STRL BLT

## (undated) DEVICE — 3M™ IOBAN™ 2 ANTIMICROBIAL INCISE DRAPE 6651EZ: Brand: IOBAN™ 2

## (undated) DEVICE — BANDAGE COMPR M W6INXL10YD WHT BGE VELC E MTRX HK AND LOOP

## (undated) DEVICE — NEEDLE HYPO 22GA L1.5IN BLK S STL HUB POLYPR SHLD REG BVL

## (undated) DEVICE — STRAP,POSITIONING,KNEE/BODY,FOAM,4X60": Brand: MEDLINE

## (undated) DEVICE — (D)PREP SKN CHLRAPRP APPL 26ML -- CONVERT TO ITEM 371833

## (undated) DEVICE — INFECTION CONTROL KIT SYS

## (undated) DEVICE — SOLUTION IRRIG 1000ML STRL H2O USP PLAS POUR BTL